# Patient Record
Sex: FEMALE | Race: WHITE | NOT HISPANIC OR LATINO | Employment: OTHER | URBAN - NONMETROPOLITAN AREA
[De-identification: names, ages, dates, MRNs, and addresses within clinical notes are randomized per-mention and may not be internally consistent; named-entity substitution may affect disease eponyms.]

---

## 2022-10-31 ENCOUNTER — APPOINTMENT (EMERGENCY)
Dept: CT IMAGING | Facility: HOSPITAL | Age: 82
End: 2022-10-31

## 2022-10-31 ENCOUNTER — HOSPITAL ENCOUNTER (EMERGENCY)
Facility: HOSPITAL | Age: 82
Discharge: HOME | End: 2022-10-31
Attending: EMERGENCY MEDICINE

## 2022-10-31 VITALS
TEMPERATURE: 97.1 F | OXYGEN SATURATION: 97 % | RESPIRATION RATE: 16 BRPM | SYSTOLIC BLOOD PRESSURE: 139 MMHG | DIASTOLIC BLOOD PRESSURE: 78 MMHG | HEART RATE: 60 BPM

## 2022-10-31 DIAGNOSIS — D35.01 BILATERAL ADRENAL ADENOMAS: ICD-10-CM

## 2022-10-31 DIAGNOSIS — R10.32 LEFT LOWER QUADRANT ABDOMINAL PAIN: Primary | ICD-10-CM

## 2022-10-31 DIAGNOSIS — D35.02 BILATERAL ADRENAL ADENOMAS: ICD-10-CM

## 2022-10-31 DIAGNOSIS — R16.0 LIVER MASS: ICD-10-CM

## 2022-10-31 LAB
ALBUMIN SERPL BCP-MCNC: 3.6 G/DL (ref 3.5–5)
ALP SERPL-CCNC: 82 U/L (ref 46–116)
ALT SERPL W P-5'-P-CCNC: 15 U/L (ref 12–78)
ANION GAP SERPL CALCULATED.3IONS-SCNC: 7 MMOL/L (ref 4–13)
BASOPHILS # BLD AUTO: 0.03 THOUSANDS/ÂΜL (ref 0–0.1)
BASOPHILS NFR BLD AUTO: 1 % (ref 0–1)
BILIRUB SERPL-MCNC: 0.58 MG/DL (ref 0.2–1)
BUN SERPL-MCNC: 15 MG/DL (ref 5–25)
CALCIUM SERPL-MCNC: 9.3 MG/DL (ref 8.3–10.1)
CHLORIDE SERPL-SCNC: 108 MMOL/L (ref 96–108)
CO2 SERPL-SCNC: 29 MMOL/L (ref 21–32)
CREAT SERPL-MCNC: 0.94 MG/DL (ref 0.6–1.3)
EOSINOPHIL # BLD AUTO: 0.13 THOUSAND/ÂΜL (ref 0–0.61)
EOSINOPHIL NFR BLD AUTO: 2 % (ref 0–6)
ERYTHROCYTE [DISTWIDTH] IN BLOOD BY AUTOMATED COUNT: 14.2 % (ref 11.6–15.1)
GFR SERPL CREATININE-BSD FRML MDRD: 56 ML/MIN/1.73SQ M
GLUCOSE SERPL-MCNC: 103 MG/DL (ref 65–140)
HCT VFR BLD AUTO: 43.6 % (ref 34.8–46.1)
HGB BLD-MCNC: 14.5 G/DL (ref 11.5–15.4)
IMM GRANULOCYTES # BLD AUTO: 0.02 THOUSAND/UL (ref 0–0.2)
IMM GRANULOCYTES NFR BLD AUTO: 0 % (ref 0–2)
LYMPHOCYTES # BLD AUTO: 1.57 THOUSANDS/ÂΜL (ref 0.6–4.47)
LYMPHOCYTES NFR BLD AUTO: 24 % (ref 14–44)
MCH RBC QN AUTO: 29.3 PG (ref 26.8–34.3)
MCHC RBC AUTO-ENTMCNC: 33.3 G/DL (ref 31.4–37.4)
MCV RBC AUTO: 88 FL (ref 82–98)
MONOCYTES # BLD AUTO: 0.45 THOUSAND/ÂΜL (ref 0.17–1.22)
MONOCYTES NFR BLD AUTO: 7 % (ref 4–12)
NEUTROPHILS # BLD AUTO: 4.38 THOUSANDS/ÂΜL (ref 1.85–7.62)
NEUTS SEG NFR BLD AUTO: 66 % (ref 43–75)
NRBC BLD AUTO-RTO: 0 /100 WBCS
PLATELET # BLD AUTO: 247 THOUSANDS/UL (ref 149–390)
PMV BLD AUTO: 10.2 FL (ref 8.9–12.7)
POTASSIUM SERPL-SCNC: 4.6 MMOL/L (ref 3.5–5.3)
PROT SERPL-MCNC: 7 G/DL (ref 6.4–8.4)
RBC # BLD AUTO: 4.95 MILLION/UL (ref 3.81–5.12)
SODIUM SERPL-SCNC: 144 MMOL/L (ref 135–147)
WBC # BLD AUTO: 6.58 THOUSAND/UL (ref 4.31–10.16)

## 2022-10-31 RX ORDER — METRONIDAZOLE 500 MG/1
500 TABLET ORAL EVERY 12 HOURS SCHEDULED
Qty: 14 TABLET | Refills: 0 | Status: SHIPPED | OUTPATIENT
Start: 2022-10-31 | End: 2022-11-07

## 2022-10-31 RX ORDER — CIPROFLOXACIN 500 MG/1
500 TABLET, FILM COATED ORAL EVERY 12 HOURS SCHEDULED
Qty: 14 TABLET | Refills: 0 | Status: SHIPPED | OUTPATIENT
Start: 2022-10-31 | End: 2022-11-07

## 2022-10-31 RX ORDER — METRONIDAZOLE 500 MG/1
500 TABLET ORAL ONCE
Status: DISCONTINUED | OUTPATIENT
Start: 2022-10-31 | End: 2022-10-31 | Stop reason: HOSPADM

## 2022-10-31 RX ORDER — CIPROFLOXACIN 500 MG/1
500 TABLET, FILM COATED ORAL ONCE
Status: COMPLETED | OUTPATIENT
Start: 2022-10-31 | End: 2022-10-31

## 2022-10-31 RX ADMIN — CIPROFLOXACIN 500 MG: 500 TABLET, FILM COATED ORAL at 20:42

## 2022-10-31 NOTE — ED NOTES
Patient okay for straight stick for bloodwork per provider at this time       Ghassan Irwin, ANDREW  10/31/22 5774

## 2022-10-31 NOTE — ED PROVIDER NOTES
History  Chief Complaint   Patient presents with   • Abdominal Pain     Patient states she has diverticulitis and was sent by her PCP for a cat scan  She complains of LLQ pain  Denies N/V/D  Patient is an 79year old female with PMH of DM, hypertension, and diverticulosis that presents the emergency department with left lower quadrant abdominal pain that started this morning  Patient denies fever, and only has pain when she is walking  She says that this feels exactly like diverticulitis that she has had several times in the past   She reports that she has diverticulitis flares that occur up to 4 times a year, and occur with changes of seasons and any time she physically exerts herself  She reports that in the past she has been placed on ciprofloxacin and called her doctor for a prescription for this but was sent to the emergency department to rule out any worsening intra-abdominal pathology with CT scan  She denies nausea, vomiting, diarrhea, blood in her stool, or urinary symptoms  Patient denies other symptoms and otherwise feels in her normal state of health  None       Past Medical History:   Diagnosis Date   • Diverticulitis    • Hypertension    • TIA (transient ischemic attack)        Past Surgical History:   Procedure Laterality Date   • COLECTOMY         History reviewed  No pertinent family history  I have reviewed and agree with the history as documented  E-Cigarette/Vaping   • E-Cigarette Use Never User      E-Cigarette/Vaping Substances     Social History     Tobacco Use   • Smoking status: Current Every Day Smoker     Packs/day: 0 25   • Smokeless tobacco: Never Used   Vaping Use   • Vaping Use: Never used   Substance Use Topics   • Drug use: Never        Review of Systems   Constitutional: Negative for chills and fever  HENT: Negative for congestion, ear pain and sore throat  Eyes: Negative for pain and visual disturbance     Respiratory: Negative for cough and shortness of breath  Cardiovascular: Negative for chest pain and palpitations  Gastrointestinal: Positive for abdominal pain (Left lower quadrant)  Negative for constipation, diarrhea, nausea and vomiting  Genitourinary: Negative for dysuria and hematuria  Musculoskeletal: Negative for arthralgias and back pain  Skin: Negative for color change and rash  Neurological: Negative for dizziness, seizures, syncope, light-headedness and headaches  All other systems reviewed and are negative  Physical Exam  ED Triage Vitals   Temperature Pulse Respirations Blood Pressure SpO2   10/31/22 2035 10/31/22 1904 10/31/22 1904 10/31/22 1904 10/31/22 1904   (!) 97 1 °F (36 2 °C) 60 16 139/78 97 %      Temp Source Heart Rate Source Patient Position - Orthostatic VS BP Location FiO2 (%)   10/31/22 2035 10/31/22 1904 -- -- --   Tympanic Monitor         Pain Score       --                    Orthostatic Vital Signs  Vitals:    10/31/22 1904   BP: 139/78   Pulse: 60       Physical Exam  Vitals and nursing note reviewed  Constitutional:       General: She is not in acute distress  Appearance: Normal appearance  She is well-developed  She is not ill-appearing  HENT:      Head: Normocephalic and atraumatic  Right Ear: External ear normal       Left Ear: External ear normal       Mouth/Throat:      Pharynx: Oropharynx is clear  No oropharyngeal exudate or posterior oropharyngeal erythema  Eyes:      General:         Right eye: No discharge  Left eye: No discharge  Extraocular Movements: Extraocular movements intact  Conjunctiva/sclera: Conjunctivae normal    Cardiovascular:      Rate and Rhythm: Normal rate and regular rhythm  Pulses: Normal pulses  Heart sounds: Normal heart sounds  No murmur heard  Pulmonary:      Effort: Pulmonary effort is normal  No respiratory distress  Breath sounds: Normal breath sounds  No wheezing, rhonchi or rales  Abdominal:      General: Abdomen is flat  Palpations: Abdomen is soft  Tenderness: There is abdominal tenderness (To deep palpation) in the left lower quadrant  There is no guarding or rebound  Negative signs include McBurney's sign  Hernia: There is no hernia in the umbilical area or ventral area  Musculoskeletal:         General: No swelling or deformity  Normal range of motion  Cervical back: Neck supple  No tenderness  Skin:     General: Skin is warm and dry  Capillary Refill: Capillary refill takes less than 2 seconds  Neurological:      Mental Status: She is alert           ED Medications  Medications   ciprofloxacin (CIPRO) tablet 500 mg (has no administration in time range)   metroNIDAZOLE (FLAGYL) tablet 500 mg (has no administration in time range)       Diagnostic Studies  Results Reviewed     Procedure Component Value Units Date/Time    Comprehensive metabolic panel [[id number]] Collected: 10/31/22 1904    Lab Status: Final result Specimen: Blood from Arm, Right Updated: 10/31/22 1951     Sodium 144 mmol/L      Potassium 4 6 mmol/L      Chloride 108 mmol/L      CO2 29 mmol/L      ANION GAP 7 mmol/L      BUN 15 mg/dL      Creatinine 0 94 mg/dL      Glucose 103 mg/dL      Calcium 9 3 mg/dL      AST --     ALT 15 U/L      Alkaline Phosphatase 82 U/L      Total Protein 7 0 g/dL      Albumin 3 6 g/dL      Total Bilirubin 0 58 mg/dL      eGFR 56 ml/min/1 73sq m     Narrative:      [place] guidelines for Chronic Kidney Disease (CKD):   •  Stage 1 with normal or high GFR (GFR > 90 mL/min/1 73 square meters)  •  Stage 2 Mild CKD (GFR = 60-89 mL/min/1 73 square meters)  •  Stage 3A Moderate CKD (GFR = 45-59 mL/min/1 73 square meters)  •  Stage 3B Moderate CKD (GFR = 30-44 mL/min/1 73 square meters)  •  Stage 4 Severe CKD (GFR = 15-29 mL/min/1 73 square meters)  •  Stage 5 End Stage CKD (GFR <15 mL/min/1 73 square meters)  Note: GFR calculation is accurate only with a steady state creatinine    CBC and differential [[id number]] Collected: 10/31/22 1904    Lab Status: Final result Specimen: Blood from Arm, Right Updated: 10/31/22 1909     WBC 6 58 Thousand/uL      RBC 4 95 Million/uL      Hemoglobin 14 5 g/dL      Hematocrit 43 6 %      MCV 88 fL      MCH 29 3 pg      MCHC 33 3 g/dL      RDW 14 2 %      MPV 10 2 fL      Platelets 260 Thousands/uL      nRBC 0 /100 WBCs      Neutrophils Relative 66 %      Immat GRANS % 0 %      Lymphocytes Relative 24 %      Monocytes Relative 7 %      Eosinophils Relative 2 %      Basophils Relative 1 %      Neutrophils Absolute 4 38 Thousands/µL      Immature Grans Absolute 0 02 Thousand/uL      Lymphocytes Absolute 1 57 Thousands/µL      Monocytes Absolute 0 45 Thousand/µL      Eosinophils Absolute 0 13 Thousand/µL      Basophils Absolute 0 03 Thousands/µL                  CT abdomen pelvis wo contrast   Final Result by Tico Zuleta MD (10/31 1956)   1  No acute findings  2   Indeterminate right hepatic dome partially calcified mass  Recommend hepatic protocol MR for further evaluation  Tissue sampling may also be required for definitive characterization  3   Left adrenal gland adenomas x2  The study was marked in EPIC for significant notification  Workstation performed: [id number]               Procedures  Procedures      ED Course                                       MDM  Number of Diagnoses or Management Options  Bilateral adrenal adenomas  Left lower quadrant abdominal pain  Liver mass  Diagnosis management comments: 82F with PMH of DM, hypertension, and extensive diverticulitis history presents the emergency department with left lower quadrant abdominal pain that started this morning  On exam the patient has tenderness to deep palpation of left lower quadrant without guarding or rebound  Patient only has pain with ambulation  Cbc and CMP are within normal limits and the patient denies any urinary symptoms    CT scan of the abdomen reveals no acute findings but does show known right hepatic mass and bilateral adrenal adenomas  These incidental findings are discussed with the patient and they will follow-up with their primary care provider for these, however these have been known and established for a long time  No pulsatile abdominal mass to suggest AAA  No Point RLQ tenderness to suggest appy  No pain out of proportion to suggest ischemic colitis  No reported vomiting or diarrhea  No reported dysuria or hematuria to suggest pyelo  No reported chest pain, pleuritic chest pain or shortness of breath  No hematemesis/melena/hematochezia reported  Able to tolerate PO  Still passing gas/stools  No reported pulmonary symptoms  No tachypnea  No suprapubic or CVA tenderness  No fever/ruq pain/jaundice  In the presence of left lower quadrant abdominal pain consistent with the patient's prior diverticulitis it is possible that she is experiencing very early symptoms for diverticulitis flare and will be treated with ciprofloxacin, however she is advised to follow-up with her primary care provider for re-evaluation if symptoms do not improve over the next few days  Disposition  Final diagnoses:   Left lower quadrant abdominal pain   Liver mass   Bilateral adrenal adenomas     Time reflects when diagnosis was documented in both MDM as applicable and the Disposition within this note     Time User Action Codes Description Comment    10/31/2022  8:20 PM Moulton Alexandr Add [R10 32] Left lower quadrant abdominal pain     10/31/2022  8:20 PM Moulton Alexandr Add [R16 0] Liver mass     10/31/2022  8:20 PM Moulton Alexandr Add [D35 01,  D35 02] Bilateral adrenal adenomas       ED Disposition     ED Disposition   Discharge    Condition   Stable    Date/Time   Mon Oct 31, 2022  8:19 PM    [facility] discharge to home/self care                 Follow-up Information     Follow up With Specialties Details Why Contact Info Additional Information [facility] Emergency Department Emergency Medicine Go to  if you develop severe left lower quadrant abdominal pain that is associated with fevers, bloody bowel movements, or blood in your vomit  [address] Emergency Department, [address], [address]          Patient's Medications   Discharge Prescriptions    CIPROFLOXACIN (CIPRO) 500 MG TABLET    Take 1 tablet (500 mg total) by mouth every 12 (twelve) hours for 7 days       Start Date: 10/31/2022End Date: 11/7/2022       Order Dose: 500 mg       Quantity: 14 tablet    Refills: 0    METRONIDAZOLE (FLAGYL) 500 MG TABLET    Take 1 tablet (500 mg total) by mouth every 12 (twelve) hours for 7 days       Start Date: 10/31/2022End Date: 11/7/2022       Order Dose: 500 mg       Quantity: 14 tablet    Refills: 0     No discharge procedures on file  PDMP Review     None           ED Provider  Attending physically available and evaluated Carlos A Porras I managed the patient along with the ED Attending      Electronically Signed by         Kary Bartlett DO  10/31/22 2039

## 2022-11-01 NOTE — ED ATTENDING ATTESTATION
10/31/2022  Steven BAUMAN DO, saw and evaluated the patient  I have discussed the patient with the resident/non-physician practitioner and agree with the resident's/non-physician practitioner's findings, Plan of Care, and MDM as documented in the resident's/non-physician practitioner's note, except where noted  All available labs and Radiology studies were reviewed  I was present for key portions of any procedure(s) performed by the resident/non-physician practitioner and I was immediately available to provide assistance  At this point I agree with the current assessment done in the Emergency Department  I have conducted an independent evaluation of this patient a history and physical is as follows:    ED Course     This is an 57-year-old female presenting to the emergency department for evaluation of left lower quadrant abdominal pain  Past medical history significant for TIA, hypertension, diverticulitis, prior surgical colectomy  She presents for left lower quadrant pain x1 day  She reports symptoms when she is walking no symptoms at rest   No associated nausea vomiting fevers loose stools urinary symptoms or other abdominal pain  Denies falls or trauma  She thinks this is due to her diverticulitis  She called her primary doctor requesting antibiotic however they referred her to the emergency department for CT imaging to rule out possibility of abscess perforation or other complication  She reports he has with the exact same symptoms she typically gets with her diverticulitis presentations  Workup in the emergency department was notable for normal vital signs  Labs were obtained which were normal   CT imaging did not reveal obvious evidence of diverticulitis or other acute process within the abdomen or pelvis  Her exam was benign without peritoneal signs  She was not in any distress  She did not require any pain medication in the ER        CT abdomen pelvis wo contrast   Final Result   1  No acute findings  2   Indeterminate right hepatic dome partially calcified mass  Recommend hepatic protocol MR for further evaluation  Tissue sampling may also be required for definitive characterization  3   Left adrenal gland adenomas x2  The study was marked in EPIC for significant notification  Workstation performed: [id number]           There were incidental findings on the CT scan  On further review of prior imaging at [facility] through Care everywhere these findings were chronic and stable  They were previously known cystic lesions in the adrenal glands  There were previously seen right hepatic dome partially calcified mass  Patient has known about these and the patient, family and I do not feel they are related to her  Her current presentation  I did inform them of the radiologist's recommendation for MR the further characterize the hepatic lesion  Will refer to primary doctor  As she knows about these findings in the appears stable she is not interested in further workup at this time  We had a conversation about what to do regarding her presenting symptoms and the lack of clear findings of diverticulitis  I discussed the risks and benefits of antibiotic therapy as is his but not benign can induce antibiotic resistance as well as colitis and other symptoms  She feels that due to the symptoms being early and typical of her previous symptoms of diverticulitis he would like to trial Cipro empirically  I did discuss that there is no hard evidence to begin this therapy but based on her story and presenting symptoms I think it is reasonable to try  She will follow closely with primary doctor  She will return here if her symptoms do not improve or worsen with antibiotic therapy      Critical Care Time  Procedures

## 2022-11-01 NOTE — DISCHARGE INSTRUCTIONS
- follow-up with your gastroenterologist for further evaluation and treatment of recurrent diverticulitis  - return to the emergency department if you develop severe left lower quadrant abdominal pain that is associated with fevers, bloody bowel movements, or blood in your vomit    - take Cipro and Flagyl for the next 7 days

## 2023-08-05 ENCOUNTER — APPOINTMENT (EMERGENCY)
Dept: CT IMAGING | Facility: HOSPITAL | Age: 83
End: 2023-08-05
Payer: MEDICARE

## 2023-08-05 ENCOUNTER — HOSPITAL ENCOUNTER (EMERGENCY)
Facility: HOSPITAL | Age: 83
Discharge: HOME | End: 2023-08-05
Attending: EMERGENCY MEDICINE
Payer: MEDICARE

## 2023-08-05 VITALS
SYSTOLIC BLOOD PRESSURE: 112 MMHG | RESPIRATION RATE: 20 BRPM | DIASTOLIC BLOOD PRESSURE: 68 MMHG | TEMPERATURE: 98 F | BODY MASS INDEX: 25.22 KG/M2 | WEIGHT: 160.72 LBS | HEART RATE: 73 BPM | OXYGEN SATURATION: 92 % | HEIGHT: 67 IN

## 2023-08-05 DIAGNOSIS — K57.92 DIVERTICULITIS: Primary | ICD-10-CM

## 2023-08-05 LAB
ALBUMIN SERPL BCP-MCNC: 4 G/DL (ref 3.5–5)
ALP SERPL-CCNC: 73 U/L (ref 34–104)
ALT SERPL W P-5'-P-CCNC: 10 U/L (ref 7–52)
ANION GAP SERPL CALCULATED.3IONS-SCNC: 11 MMOL/L
APTT PPP: 32 SECONDS (ref 23–37)
AST SERPL W P-5'-P-CCNC: 13 U/L (ref 13–39)
ATRIAL RATE: 90 BPM
BASOPHILS # BLD AUTO: 0.02 THOUSANDS/ÂΜL (ref 0–0.1)
BASOPHILS NFR BLD AUTO: 0 % (ref 0–1)
BILIRUB SERPL-MCNC: 1.82 MG/DL (ref 0.2–1)
BUN SERPL-MCNC: 15 MG/DL (ref 5–25)
CALCIUM SERPL-MCNC: 9.8 MG/DL (ref 8.4–10.2)
CHLORIDE SERPL-SCNC: 96 MMOL/L (ref 96–108)
CO2 SERPL-SCNC: 26 MMOL/L (ref 21–32)
CREAT SERPL-MCNC: 0.99 MG/DL (ref 0.6–1.3)
EOSINOPHIL # BLD AUTO: 0.06 THOUSAND/ÂΜL (ref 0–0.61)
EOSINOPHIL NFR BLD AUTO: 1 % (ref 0–6)
ERYTHROCYTE [DISTWIDTH] IN BLOOD BY AUTOMATED COUNT: 13.8 % (ref 11.6–15.1)
GFR SERPL CREATININE-BSD FRML MDRD: 52 ML/MIN/1.73SQ M
GLUCOSE SERPL-MCNC: 113 MG/DL (ref 65–140)
HCT VFR BLD AUTO: 45.5 % (ref 34.8–46.1)
HGB BLD-MCNC: 15 G/DL (ref 11.5–15.4)
IMM GRANULOCYTES # BLD AUTO: 0.04 THOUSAND/UL (ref 0–0.2)
IMM GRANULOCYTES NFR BLD AUTO: 0 % (ref 0–2)
INR PPP: 1.11 (ref 0.84–1.19)
LACTATE SERPL-SCNC: 1.3 MMOL/L (ref 0.5–2)
LIPASE SERPL-CCNC: <6 U/L (ref 11–82)
LYMPHOCYTES # BLD AUTO: 1.49 THOUSANDS/ÂΜL (ref 0.6–4.47)
LYMPHOCYTES NFR BLD AUTO: 13 % (ref 14–44)
MCH RBC QN AUTO: 29.1 PG (ref 26.8–34.3)
MCHC RBC AUTO-ENTMCNC: 33 G/DL (ref 31.4–37.4)
MCV RBC AUTO: 88 FL (ref 82–98)
MONOCYTES # BLD AUTO: 0.92 THOUSAND/ÂΜL (ref 0.17–1.22)
MONOCYTES NFR BLD AUTO: 8 % (ref 4–12)
NEUTROPHILS # BLD AUTO: 8.86 THOUSANDS/ÂΜL (ref 1.85–7.62)
NEUTS SEG NFR BLD AUTO: 78 % (ref 43–75)
NRBC BLD AUTO-RTO: 0 /100 WBCS
P AXIS: 73 DEGREES
PLATELET # BLD AUTO: 286 THOUSANDS/UL (ref 149–390)
PMV BLD AUTO: 11.1 FL (ref 8.9–12.7)
POTASSIUM SERPL-SCNC: 3.3 MMOL/L (ref 3.5–5.3)
PR INTERVAL: 158 MS
PROCALCITONIN SERPL-MCNC: 0.1 NG/ML
PROT SERPL-MCNC: 7.2 G/DL (ref 6.4–8.4)
PROTHROMBIN TIME: 14.5 SECONDS (ref 11.6–14.5)
QRS AXIS: 84 DEGREES
QRSD INTERVAL: 84 MS
QT INTERVAL: 380 MS
QTC INTERVAL: 464 MS
RBC # BLD AUTO: 5.16 MILLION/UL (ref 3.81–5.12)
SODIUM SERPL-SCNC: 133 MMOL/L (ref 135–147)
T WAVE AXIS: 60 DEGREES
VENTRICULAR RATE: 90 BPM
WBC # BLD AUTO: 11.39 THOUSAND/UL (ref 4.31–10.16)

## 2023-08-05 PROCEDURE — 93005 ELECTROCARDIOGRAM TRACING: CPT

## 2023-08-05 PROCEDURE — 80053 COMPREHEN METABOLIC PANEL: CPT

## 2023-08-05 PROCEDURE — 83690 ASSAY OF LIPASE: CPT

## 2023-08-05 PROCEDURE — 84145 PROCALCITONIN (PCT): CPT

## 2023-08-05 PROCEDURE — 83605 ASSAY OF LACTIC ACID: CPT

## 2023-08-05 PROCEDURE — 96361 HYDRATE IV INFUSION ADD-ON: CPT

## 2023-08-05 PROCEDURE — 87040 BLOOD CULTURE FOR BACTERIA: CPT

## 2023-08-05 PROCEDURE — 99284 EMERGENCY DEPT VISIT MOD MDM: CPT

## 2023-08-05 PROCEDURE — G1004 CDSM NDSC: HCPCS

## 2023-08-05 PROCEDURE — 74176 CT ABD & PELVIS W/O CONTRAST: CPT

## 2023-08-05 PROCEDURE — 36415 COLL VENOUS BLD VENIPUNCTURE: CPT

## 2023-08-05 PROCEDURE — 85610 PROTHROMBIN TIME: CPT

## 2023-08-05 PROCEDURE — 96374 THER/PROPH/DIAG INJ IV PUSH: CPT

## 2023-08-05 PROCEDURE — 85730 THROMBOPLASTIN TIME PARTIAL: CPT

## 2023-08-05 PROCEDURE — 99285 EMERGENCY DEPT VISIT HI MDM: CPT | Performed by: EMERGENCY MEDICINE

## 2023-08-05 PROCEDURE — 85025 COMPLETE CBC W/AUTO DIFF WBC: CPT

## 2023-08-05 RX ORDER — OMEGA-3 FATTY ACIDS/FISH OIL 300-1000MG
200 CAPSULE ORAL EVERY 6 HOURS PRN
Status: ON HOLD | COMMUNITY
End: 2023-08-13 | Stop reason: ALTCHOICE

## 2023-08-05 RX ORDER — HYDROMORPHONE HCL IN WATER/PF 6 MG/30 ML
0.2 PATIENT CONTROLLED ANALGESIA SYRINGE INTRAVENOUS ONCE
Status: COMPLETED | OUTPATIENT
Start: 2023-08-05 | End: 2023-08-05

## 2023-08-05 RX ORDER — AMOXICILLIN AND CLAVULANATE POTASSIUM 875; 125 MG/1; MG/1
1 TABLET, FILM COATED ORAL EVERY 8 HOURS
Qty: 15 TABLET | Refills: 0 | Status: SHIPPED | OUTPATIENT
Start: 2023-08-05 | End: 2023-08-10

## 2023-08-05 RX ORDER — ERGOCALCIFEROL 1.25 MG/1
50000 CAPSULE ORAL
COMMUNITY

## 2023-08-05 RX ORDER — ONDANSETRON 4 MG/1
4 TABLET, FILM COATED ORAL EVERY 8 HOURS PRN
Qty: 12 TABLET | Refills: 0 | Status: SHIPPED | OUTPATIENT
Start: 2023-08-05

## 2023-08-05 RX ORDER — CIPROFLOXACIN 500 MG/1
500 TABLET, FILM COATED ORAL 2 TIMES DAILY
COMMUNITY
Start: 2023-08-04 | End: 2023-08-11

## 2023-08-05 RX ORDER — ALPRAZOLAM 0.25 MG/1
.125-.25 TABLET ORAL 2 TIMES DAILY PRN
COMMUNITY
Start: 2023-07-15 | End: 2024-01-11

## 2023-08-05 RX ORDER — COLCHICINE 0.6 MG/1
0.6 TABLET ORAL 2 TIMES DAILY
Status: ON HOLD | COMMUNITY
Start: 2023-07-26 | End: 2023-08-13 | Stop reason: ALTCHOICE

## 2023-08-05 RX ORDER — AMOXICILLIN AND CLAVULANATE POTASSIUM 875; 125 MG/1; MG/1
1 TABLET, FILM COATED ORAL ONCE
Status: DISCONTINUED | OUTPATIENT
Start: 2023-08-05 | End: 2023-08-05 | Stop reason: HOSPADM

## 2023-08-05 RX ORDER — ENALAPRIL MALEATE 10 MG/1
1 TABLET ORAL DAILY
COMMUNITY
Start: 2023-03-20 | End: 2023-08-16

## 2023-08-05 RX ORDER — ACETAMINOPHEN 500 MG
500 TABLET ORAL EVERY 4 HOURS PRN
COMMUNITY

## 2023-08-05 RX ADMIN — HYDROMORPHONE HYDROCHLORIDE 0.2 MG: 0.2 INJECTION, SOLUTION INTRAMUSCULAR; INTRAVENOUS; SUBCUTANEOUS at 09:17

## 2023-08-05 RX ADMIN — SODIUM CHLORIDE 1000 ML: 0.9 INJECTION, SOLUTION INTRAVENOUS at 09:41

## 2023-08-05 NOTE — ED NOTES
Pt refused augmentin at this time as she is already taking cipro. Dr Manjit Anderson aware.       Cj Powell, ANDREW  08/05/23 1423

## 2023-08-05 NOTE — ED ATTENDING ATTESTATION
Final Diagnosis:  1. Diverticulitis      ED Course as of 08/05/23 1236   Sat Aug 05, 2023   2207 CT abdomen pelvis wo contrast  No obvious free air in the abdomen, there is a left-sided renal cyst, patient appears to have extensive diverticulosis, possible inflammation in the lower colon to indicate diverticulitis but unclear   0957 Procedure Note: EKG  Date/Time: 08/05/23 9:57 AM   Interpreted by: Marsha Hand  Indications / Diagnosis: ab pain  ECG reviewed by me, the ED Provider: yes   The EKG demonstrates:  Rhythm: normal sinus  Intervals: normal intervals  Axis: normal axis  QRS/Blocks: normal QRS  ST Changes: No acute ST Changes, no STD/LILIAN. No diffuse elevations to indicate pericarditis. No coved ST elevations greater than 2mm with negative T waves in V1-3 to indicate concern for brugada. No biphasic T waves in V2, V3 to indicate Wellens (critical stenosis of LAD). No elevation in aVR or deviation when compared to V1 (can be associated with ST depression in I,II, V4-6 when left main occlusion is present). Gil Burt MD, saw and evaluated the patient. All available labs and X-rays were ordered by me or the resident and have been reviewed by myself. I discussed the patient with the resident / non-physician and agree with the resident's / non-physician practitioner's findings and plan as documented in the resident's / non-physician practicitioner's note, except where noted. At this point, I agree with the current assessment done in the ED. I was present during key portions of all procedures performed unless otherwise stated. Chief Complaint   Patient presents with   • Abdominal Pain     Abdominal pain with constipation for few days. History of diverticulitis     This is a 79 y.o. female presenting for evaluation of abdominal pain. Patient states that she was having left lower quadrant abdominal pain for approximately last 2 days.   Does have a history of colectomy as well as diverticulitis. Patient endorses some nausea. States she has not had bowel movement in a couple days. No episodes of vomiting. Denies any chest pain, shortness of breath. Patient states that she did have a fever yesterday. Review of records show that the patient was seen a couple months ago for similar type presentation without any acute pathology found. Was provided with Cipro and Flagyl at that time as well. She appears to have an iodine allergy. Most of her scans are without contrast or, if it is administered she gets oral contrast.  She has not had a scan with IV contrast since approximately 2012. PMH:   has a past medical history of Diverticulitis, Hypertension, and TIA (transient ischemic attack). PSH:   has a past surgical history that includes Colectomy. Procedures     Social:  Social History     Substance and Sexual Activity   Alcohol Use None     Social History     Tobacco Use   Smoking Status Every Day   • Packs/day: 0.25   • Types: Cigarettes   Smokeless Tobacco Never     Social History     Substance and Sexual Activity   Drug Use Never     PE:  Vitals:    08/05/23 0844 08/05/23 0900 08/05/23 0945 08/05/23 1015   BP:  104/62 110/68 112/68   BP Location:  Right arm Right arm Right arm   Pulse:  88 76 73   Resp:  18 18 20   Temp:       TempSrc:       SpO2:  93% 92% 92%   Weight:       Height: 5' 7" (1.702 m)          A:    Unless otherwise specified above:     General: VS reviewed  Appears in NAD     Head: Normocephalic, atraumatic     CV: No pallor noted  Lungs:   No respiratory distress     Abdomen:  Soft, non-tender, non-distended     MSK:   No obvious deformity     Skin: No obvious rash. Neuro: Awake, alert, GCS15, CN II-XII grossly intact. Speaking in full sentences. Motor grossly intact. Psychiatric/Behavioral: Appropriate mood and affect   Exam: deferred    P:  -Patient appears nondistressed sitting in bed.   She states that her abdominal pain has improved significantly after receiving 0.2 mg of Dilaudid. Will evaluate for intra-abdominal pathology such as pancreatitis, diverticulitis, bowel obstruction. Provide fluids. -CT consider consistent with diverticulitis. Reviewed results with patient. She feels improved after seeing medications here. We will start her on antibiotics. Return precautions reviewed. - 13 point ROS was performed and all are normal unless stated in the history above. - Nursing note reviewed. Vitals reviewed. - Orders placed by myself and/or advanced practitioner / resident.    - Previous chart was reviewed  - No language barrier.   - History obtained from patient. - There are no limitations to the history obtained. Code Status: No Order  Advance Directive and Living Will:      Power of :    POLST:      Medications   amoxicillin-clavulanate (AUGMENTIN) 875-125 mg per tablet 1 tablet (1 tablet Oral Not Given 8/5/23 1232)   HYDROmorphone HCl (DILAUDID) injection 0.2 mg (0.2 mg Intravenous Given 8/5/23 0917)   sodium chloride 0.9 % bolus 1,000 mL (0 mL Intravenous Stopped 8/5/23 1041)     CT abdomen pelvis wo contrast   Final Result         1. Focal sigmoid diverticulitis without abscess or free air. Given associated colonic wall thickening, consider colonoscopic follow-up after resolution of acute episode of illness--if not already recently performed--to exclude underlying mural infiltrative process. 2. Partially calcified mass at the right liver dome and nonaggressive left adrenal lesion. Review of reports from outside studies in [address] [place] [address] shows that these findings have been present since at least 2007, obviating need for further imaging    work-up. Note: The study was marked in EPIC for significant notification. Colonoscopic follow-up reminder notification was scheduled in the electronic medical record.             Workstation performed: [id number]           Orders Placed This Encounter   Procedures   • Blood culture #1   • Blood culture #2   • CT abdomen pelvis wo contrast   • CBC and differential   • Comprehensive metabolic panel   • Lactic acid   • Procalcitonin   • Protime-INR   • APTT   • UA w Reflex to Microscopic w Reflex to Culture   • Lipase   • ECG 12 lead     Labs Reviewed   CBC AND DIFFERENTIAL - Abnormal       Result Value Ref Range Status    WBC 11.39 (*) 4.31 - 10.16 Thousand/uL Final    RBC 5.16 (*) 3.81 - 5.12 Million/uL Final    Hemoglobin 15.0  11.5 - 15.4 g/dL Final    Hematocrit 45.5  34.8 - 46.1 % Final    MCV 88  82 - 98 fL Final    MCH 29.1  26.8 - 34.3 pg Final    MCHC 33.0  31.4 - 37.4 g/dL Final    RDW 13.8  11.6 - 15.1 % Final    MPV 11.1  8.9 - 12.7 fL Final    Platelets 156  180 - 390 Thousands/uL Final    nRBC 0  /100 WBCs Final    Neutrophils Relative 78 (*) 43 - 75 % Final    Immat GRANS % 0  0 - 2 % Final    Lymphocytes Relative 13 (*) 14 - 44 % Final    Monocytes Relative 8  4 - 12 % Final    Eosinophils Relative 1  0 - 6 % Final    Basophils Relative 0  0 - 1 % Final    Neutrophils Absolute 8.86 (*) 1.85 - 7.62 Thousands/µL Final    Immature Grans Absolute 0.04  0.00 - 0.20 Thousand/uL Final    Lymphocytes Absolute 1.49  0.60 - 4.47 Thousands/µL Final    Monocytes Absolute 0.92  0.17 - 1.22 Thousand/µL Final    Eosinophils Absolute 0.06  0.00 - 0.61 Thousand/µL Final    Basophils Absolute 0.02  0.00 - 0.10 Thousands/µL Final   COMPREHENSIVE METABOLIC PANEL - Abnormal    Sodium 133 (*) 135 - 147 mmol/L Final    Potassium 3.3 (*) 3.5 - 5.3 mmol/L Final    Chloride 96  96 - 108 mmol/L Final    CO2 26  21 - 32 mmol/L Final    ANION GAP 11  mmol/L Final    BUN 15  5 - 25 mg/dL Final    Creatinine 0.99  0.60 - 1.30 mg/dL Final    Comment: Standardized to IDMS reference method    Glucose 113  65 - 140 mg/dL Final    Comment: If the patient is fasting, the ADA then defines impaired fasting glucose as > 100 mg/dL and diabetes as > or equal to 123 mg/dL.     Calcium 9.8  8.4 - 10.2 mg/dL Final AST 13  13 - 39 U/L Final    ALT 10  7 - 52 U/L Final    Comment: Specimen collection should occur prior to Sulfasalazine administration due to the potential for falsely depressed results. Alkaline Phosphatase 73  34 - 104 U/L Final    Total Protein 7.2  6.4 - 8.4 g/dL Final    Albumin 4.0  3.5 - 5.0 g/dL Final    Total Bilirubin 1.82 (*) 0.20 - 1.00 mg/dL Final    Comment: Use of this assay is not recommended for patients undergoing treatment with eltrombopag due to the potential for falsely elevated results. N-acetyl-p-benzoquinone imine (metabolite of Acetaminophen) will generate erroneously low results in samples for patients that have taken an overdose of Acetaminophen. eGFR 52  ml/min/1.73sq m Final    Narrative:     [facility] guidelines for Chronic Kidney Disease (CKD):   •  Stage 1 with normal or high GFR (GFR > 90 mL/min/1.73 square meters)  •  Stage 2 Mild CKD (GFR = 60-89 mL/min/1.73 square meters)  •  Stage 3A Moderate CKD (GFR = 45-59 mL/min/1.73 square meters)  •  Stage 3B Moderate CKD (GFR = 30-44 mL/min/1.73 square meters)  •  Stage 4 Severe CKD (GFR = 15-29 mL/min/1.73 square meters)  •  Stage 5 End Stage CKD (GFR <15 mL/min/1.73 square meters)  Note: GFR calculation is accurate only with a steady state creatinine   LIPASE - Abnormal    Lipase <6 (*) 11 - 82 u/L Final   LACTIC ACID, PLASMA (W/REFLEX IF RESULT > 2.0) - Normal    LACTIC ACID 1.3  0.5 - 2.0 mmol/L Final    Narrative:     Result may be elevated if tourniquet was used during collection. PROCALCITONIN TEST - Normal    Procalcitonin 0.10  <=0.25 ng/ml Final    Comment: Suspected Lower Respiratory Tract Infection (LRTI):  - LESS than or EQUAL to 0.25 ng/mL:   low likelihood for bacterial LRTI; antibiotics DISCOURAGED.  - GREATER than 0.25 ng/mL:   increased likelihood for bacterial LRTI; antibiotics ENCOURAGED. Suspected Sepsis:  - Strongly consider initiating antibiotics in ALL UNSTABLE patients.   - LESS than or EQUAL to 0.5 ng/mL:   low likelihood for bacterial sepsis; antibiotics DISCOURAGED.  - GREATER than 0.5 ng/mL:   increased likelihood for bacterial sepsis; antibiotics ENCOURAGED.  - GREATER than 2 ng/mL:   high risk for severe sepsis / septic shock; antibiotics strongly ENCOURAGED. Decisions on antibiotic use should not be based solely on Procalcitonin (PCT) levels. If PCT is low but uncertainty exists with stopping antibiotics, repeat PCT in 6-24 hours to confirm the low level. If antibiotics are administered (regardless if initial PCT was high or low), repeat PCT every 1-2 days to consider early antibiotic cessation (when GREATER than 80% decrease from the peak OR when PCT drops below designated cutoffs, whichever comes first), so long as the infection is NOT one that typically requires prolonged treatment durations (e.g., bone/joint infections, endocarditis, Staph. aureus bacteremia).     Situations of FALSE-POSITIVE Procalcitonin values:  1) Newborns < 67 hours old  2) Massive stress from severe trauma / burns, major surgery, acute pancreatitis, cardiogenic / hemorrhagic shock, sickle cell crisis, or other organ perfusion abnormalities  3) Malaria and some Candidal infections  4) Treatment with agents that stimulate cytokines (e.g., OKT3, anti-lymphocyte globulins, alemtuzumab, IL-2, granulocyte transfusion [NOT GCSFs])  5) Chronic renal disease causes elevated baseline levels (consider GREATER than 0.75 ng/mL as an abnormal cut-off); initiating HD/CRRT may cause transient decreases  6) Paraneoplastic syndromes from medullary thyroid or SCLC, some forms of vasculitis, and acute fjxcx-az-rehy disease    Situations of FALSE-NEGATIVE Procalcitonin values:  1) Too early in clinical course for PCT to have reached its peak (may repeat in 6-24 hours to confirm low level)  2) Localized infection WITHOUT systemic (SIRS / sepsis) response (e.g., an abscess, osteomyelitis, cystitis)  3) Mycobacteria (e.g., Tuberculosis, MAC)  4) Cystic fibrosis exacerbations     PROTIME-INR - Normal    Protime 14.5  11.6 - 14.5 seconds Final    INR 1.11  0.84 - 1.19 Final   APTT - Normal    PTT 32  23 - 37 seconds Final    Comment: Therapeutic Heparin Range =  60-90 seconds   BLOOD CULTURE   BLOOD CULTURE   UA W REFLEX TO MICROSCOPIC WITH REFLEX TO CULTURE     Time reflects when diagnosis was documented in both MDM as applicable and the Disposition within this note     Time User Action Codes Description Comment    8/5/2023 12:12 PM Petra Munguia Add [K57.92] Diverticulitis       ED Disposition     ED Disposition   Discharge    Condition   Good    Date/Time   Sat Aug 5, 2023 12:16 PM    [address] discharge to home/self care. Follow-up Information     Follow up With Specialties Details Why JEEVAN Lin Physician Assistant Schedule an appointment as soon as possible for a visit   [address] [place] [address] Drive  [phone]          Patient's Medications   Discharge Prescriptions    AMOXICILLIN-CLAVULANATE (AUGMENTIN) 875-125 MG PER TABLET    Take 1 tablet by mouth every 8 (eight) hours for 5 days       Start Date: 8/5/2023  End Date: 8/10/2023       Order Dose: 1 tablet       Quantity: 15 tablet    Refills: 0    ONDANSETRON (ZOFRAN) 4 MG TABLET    Take 1 tablet (4 mg total) by mouth every 8 (eight) hours as needed for nausea or vomiting       Start Date: 8/5/2023  End Date: --       Order Dose: 4 mg       Quantity: 12 tablet    Refills: 0     No discharge procedures on file. Prior to Admission Medications   Prescriptions Last Dose Informant Patient Reported? Taking?    ALPRAZolam (XANAX) 0.25 mg tablet   Yes Yes   Sig: Take 0.125-0.25 mg by mouth 2 (two) times a day as needed   Ibuprofen 200 MG CAPS   Yes No   Sig: Take by mouth   acetaminophen (TYLENOL) 500 mg tablet   Yes No   Sig: Take by mouth   ciprofloxacin (CIPRO) 500 mg tablet 8/5/2023  Yes Yes   Sig: Take 500 mg by mouth 2 (two) times a day   colchicine (COLCRYS) 0.6 mg tablet   Yes Yes   Sig: Take 0.6 mg by mouth 2 (two) times a day   enalapril (VASOTEC) 10 mg tablet   Yes Yes   Sig: Take 1 tablet by mouth daily   ergocalciferol (ERGOCALCIFEROL) 1.25 MG (23853 UT) capsule   Yes No   Sig: Take 50,000 Units by mouth      Facility-Administered Medications: None       Portions of the record may have been created with voice recognition software. Occasional wrong word or "sound a like" substitutions may have occurred due to the inherent limitations of voice recognition software. Read the chart carefully and recognize, using context, where substitutions have occurred.     Electronically signed by:  Ronnie Harley

## 2023-08-05 NOTE — DISCHARGE INSTRUCTIONS
Thank you for coming into the [facility] ED today! I am sorry that you haven't been feeling well. I am prescribing you Augmentin, and Zofran for nausea if needed. If pain flares up, try high-strength tylenol. Please follow up with your PCP for further management of your diverticulitis and discussion of incidental findings.

## 2023-08-05 NOTE — ED PROVIDER NOTES
History  Chief Complaint   Patient presents with   • Abdominal Pain     Abdominal pain with constipation for few days. History of diverticulitis     Patient is an 21-year-old female with history of diverticulitis/diverticulosis, colectomy, TIA, and HTN who presents with her daughter for chief complaint of 9/10, intermittent LLQ pain with constipation for the past 2 days. She took Advil and Cipro with little to no relief. She denies blood in her stool or urine, but says that she had fever and chills yesterday with nausea today. Prior to Admission Medications   Prescriptions Last Dose Informant Patient Reported? Taking? ALPRAZolam (XANAX) 0.25 mg tablet   Yes Yes   Sig: Take 0.125-0.25 mg by mouth 2 (two) times a day as needed   Ibuprofen 200 MG CAPS   Yes No   Sig: Take by mouth   acetaminophen (TYLENOL) 500 mg tablet   Yes No   Sig: Take by mouth   ciprofloxacin (CIPRO) 500 mg tablet 8/5/2023  Yes Yes   Sig: Take 500 mg by mouth 2 (two) times a day   colchicine (COLCRYS) 0.6 mg tablet   Yes Yes   Sig: Take 0.6 mg by mouth 2 (two) times a day   enalapril (VASOTEC) 10 mg tablet   Yes Yes   Sig: Take 1 tablet by mouth daily   ergocalciferol (ERGOCALCIFEROL) 1.25 MG (33157 UT) capsule   Yes No   Sig: Take 50,000 Units by mouth      Facility-Administered Medications: None       Past Medical History:   Diagnosis Date   • Diverticulitis    • Hypertension    • TIA (transient ischemic attack)        Past Surgical History:   Procedure Laterality Date   • COLECTOMY         History reviewed. No pertinent family history. I have reviewed and agree with the history as documented.     E-Cigarette/Vaping   • E-Cigarette Use Never User      E-Cigarette/Vaping Substances     Social History     Tobacco Use   • Smoking status: Every Day     Packs/day: 0.25     Types: Cigarettes   • Smokeless tobacco: Never   Vaping Use   • Vaping Use: Never used   Substance Use Topics   • Drug use: Never        Review of Systems Constitutional: Positive for appetite change, chills and fever. Respiratory: Negative for shortness of breath. Some congestion and coughing recently   Cardiovascular: Negative for chest pain. Gastrointestinal: Positive for abdominal pain, constipation and nausea. Negative for blood in stool and vomiting. Genitourinary: Negative for hematuria. Neurological: Negative for headaches. Physical Exam  ED Triage Vitals [08/05/23 0839]   Temperature Pulse Respirations Blood Pressure SpO2   98 °F (36.7 °C) 93 18 120/66 96 %      Temp Source Heart Rate Source Patient Position - Orthostatic VS BP Location FiO2 (%)   Temporal Monitor Sitting Right arm --      Pain Score       9             Orthostatic Vital Signs  Vitals:    08/05/23 0839 08/05/23 0900 08/05/23 0945 08/05/23 1015   BP: 120/66 104/62 110/68 112/68   Pulse: 93 88 76 73   Patient Position - Orthostatic VS: Sitting Lying Lying Lying       Physical Exam  Constitutional:       General: She is not in acute distress. Appearance: She is well-developed. She is not toxic-appearing. HENT:      Head: Normocephalic and atraumatic. Cardiovascular:      Rate and Rhythm: Normal rate and regular rhythm. Heart sounds: Normal heart sounds. Pulmonary:      Effort: Pulmonary effort is normal.      Breath sounds: Wheezing present. Abdominal:      General: Abdomen is flat. Bowel sounds are normal.      Palpations: Abdomen is soft. There is no hepatomegaly, splenomegaly or mass. Tenderness: There is abdominal tenderness in the left lower quadrant. There is guarding. Skin:     General: Skin is warm and dry. Coloration: Skin is not jaundiced. Findings: No erythema or rash. Neurological:      Mental Status: She is alert.          ED Medications  Medications   amoxicillin-clavulanate (AUGMENTIN) 875-125 mg per tablet 1 tablet (has no administration in time range)   HYDROmorphone HCl (DILAUDID) injection 0.2 mg (0.2 mg Intravenous Given 8/5/23 0917)   sodium chloride 0.9 % bolus 1,000 mL (0 mL Intravenous Stopped 8/5/23 1041)       Diagnostic Studies  Results Reviewed     Procedure Component Value Units Date/Time    Comprehensive metabolic panel [[id number]]  (Abnormal) Collected: 08/05/23 0915    Lab Status: Final result Specimen: Blood from Arm, Left Updated: 08/05/23 0953     Sodium 133 mmol/L      Potassium 3.3 mmol/L      Chloride 96 mmol/L      CO2 26 mmol/L      ANION GAP 11 mmol/L      BUN 15 mg/dL      Creatinine 0.99 mg/dL      Glucose 113 mg/dL      Calcium 9.8 mg/dL      AST 13 U/L      ALT 10 U/L      Alkaline Phosphatase 73 U/L      Total Protein 7.2 g/dL      Albumin 4.0 g/dL      Total Bilirubin 1.82 mg/dL      eGFR 52 ml/min/1.73sq m     Narrative:      [place] guidelines for Chronic Kidney Disease (CKD):   •  Stage 1 with normal or high GFR (GFR > 90 mL/min/1.73 square meters)  •  Stage 2 Mild CKD (GFR = 60-89 mL/min/1.73 square meters)  •  Stage 3A Moderate CKD (GFR = 45-59 mL/min/1.73 square meters)  •  Stage 3B Moderate CKD (GFR = 30-44 mL/min/1.73 square meters)  •  Stage 4 Severe CKD (GFR = 15-29 mL/min/1.73 square meters)  •  Stage 5 End Stage CKD (GFR <15 mL/min/1.73 square meters)  Note: GFR calculation is accurate only with a steady state creatinine    Lipase [[id number]]  (Abnormal) Collected: 08/05/23 0915    Lab Status: Final result Specimen: Blood from Arm, Left Updated: 08/05/23 0953     Lipase <6 u/L     Procalcitonin [[id number]]  (Normal) Collected: 08/05/23 0915    Lab Status: Final result Specimen: Blood from Arm, Left Updated: 08/05/23 0947     Procalcitonin 0.10 ng/ml     Lactic acid [[id number]]  (Normal) Collected: 08/05/23 0915    Lab Status: Final result Specimen: Blood from Arm, Left Updated: 08/05/23 0940     LACTIC ACID 1.3 mmol/L     Narrative:      Result may be elevated if tourniquet was used during collection.     Roxanna Valera [[id number]]  (Normal) Collected: 08/05/23 0915 Lab Status: Final result Specimen: Blood from Arm, Left Updated: 08/05/23 0933     Protime 14.5 seconds      INR 1.11    APTT [[id number]]  (Normal) Collected: 08/05/23 0915    Lab Status: Final result Specimen: Blood from Arm, Left Updated: 08/05/23 0933     PTT 32 seconds     CBC and differential [[id number]]  (Abnormal) Collected: 08/05/23 0915    Lab Status: Final result Specimen: Blood from Arm, Left Updated: 08/05/23 0922     WBC 11.39 Thousand/uL      RBC 5.16 Million/uL      Hemoglobin 15.0 g/dL      Hematocrit 45.5 %      MCV 88 fL      MCH 29.1 pg      MCHC 33.0 g/dL      RDW 13.8 %      MPV 11.1 fL      Platelets 056 Thousands/uL      nRBC 0 /100 WBCs      Neutrophils Relative 78 %      Immat GRANS % 0 %      Lymphocytes Relative 13 %      Monocytes Relative 8 %      Eosinophils Relative 1 %      Basophils Relative 0 %      Neutrophils Absolute 8.86 Thousands/µL      Immature Grans Absolute 0.04 Thousand/uL      Lymphocytes Absolute 1.49 Thousands/µL      Monocytes Absolute 0.92 Thousand/µL      Eosinophils Absolute 0.06 Thousand/µL      Basophils Absolute 0.02 Thousands/µL     Blood culture #2 [[id number]] Collected: 08/05/23 0915    Lab Status: In process Specimen: Blood from Arm, Right Updated: 08/05/23 0920    Blood culture #1 [[id number]] Collected: 08/05/23 0915    Lab Status: In process Specimen: Blood from Arm, Left Updated: 08/05/23 0920    UA w Reflex to Microscopic w Reflex to Culture [[id number]]     Lab Status: No result Specimen: Urine                  CT abdomen pelvis wo contrast   Final Result by Lianna Hardin MD (08/05 1100)         1. Focal sigmoid diverticulitis without abscess or free air. Given associated colonic wall thickening, consider colonoscopic follow-up after resolution of acute episode of illness--if not already recently performed--to exclude underlying mural infiltrative process.       2. Partially calcified mass at the right liver dome and nonaggressive left adrenal lesion. Review of reports from outside studies in [address] [place], [place] [address]mary Lopez shows that these findings have been present since at least 2007, obviating need for further imaging    work-up. Note: The study was marked in EPIC for significant notification. Colonoscopic follow-up reminder notification was scheduled in the electronic medical record. Workstation performed: [id number]                       SBIRT 20yo+    Lydia Arm Most Recent Value   Initial Alcohol Screen: US AUDIT-C     1. How often do you have a drink containing alcohol? 0 Filed at: 08/05/2023 0845   2. How many drinks containing alcohol do you have on a typical day you are drinking? 0 Filed at: 08/05/2023 0845   3a. Male UNDER 65: How often do you have five or more drinks on one occasion? 0 Filed at: 08/05/2023 0845   3b. FEMALE Any Age, or MALE 65+: How often do you have 4 or more drinks on one occassion? 0 Filed at: 08/05/2023 0845   Audit-C Score 0 Filed at: 08/05/2023 3570   MARY: How many times in the past year have you. .. Used an illegal drug or used a prescription medication for non-medical reasons? Never Filed at: 08/05/2023 0845                Medical Decision Making  Amount and/or Complexity of Data Reviewed  Labs: ordered. Details: CBC, CMP, lactic acid, Lipase, PT/INR & PTT  Radiology: ordered. Details: CT Abd w/o contrast showed sigmoid diverticulitis without abscess or free air. It also showed a "partially calcified mass at the right liver dome and nonaggressive left adrenal lesion."   ECG/medicine tests: ordered and independent interpretation performed. Details: Normal sinus rhythm. No acute abnormalities  Discussion of management or test interpretation with external provider(s): Discussed CT results with patient. Patient's pain is now minimized and she feels ready to go home and is amenable to outpatient management with antibiotics, over-the-counter pain meds, and zofran, with close follow-up with PCP.  She knows about the liver calcification; apparently it's a cavernous liver hemangioma. Daughter was aware of the adrenal lesion and says that it was there on the last scan. Risk  Prescription drug management. Disposition  Final diagnoses:   Diverticulitis     Time reflects when diagnosis was documented in both MDM as applicable and the Disposition within this note     Time User Action Codes Description Comment    8/5/2023 12:12 PM Sakshi Herrera Add [K57.92] Diverticulitis       ED Disposition     ED Disposition   Discharge    Condition   Good    Date/Time   Sat Aug 5, 2023 12:16 PM    [address] discharge to home/self care. Follow-up Information     Follow up With Specialties Details Why JEEVAN Lin Physician Assistant Schedule an appointment as soon as possible for a visit   [address] [place] [address] Drive  [phone]            Patient's Medications   Discharge Prescriptions    AMOXICILLIN-CLAVULANATE (AUGMENTIN) 875-125 MG PER TABLET    Take 1 tablet by mouth every 8 (eight) hours for 5 days       Start Date: 8/5/2023  End Date: 8/10/2023       Order Dose: 1 tablet       Quantity: 15 tablet    Refills: 0    ONDANSETRON (ZOFRAN) 4 MG TABLET    Take 1 tablet (4 mg total) by mouth every 8 (eight) hours as needed for nausea or vomiting       Start Date: 8/5/2023  End Date: --       Order Dose: 4 mg       Quantity: 12 tablet    Refills: 0     No discharge procedures on file. PDMP Review     None           ED Provider  Attending physically available and evaluated Violet Medico. I managed the patient along with the ED Attending.     Electronically Signed by         Sakshi Herrera DO  08/05/23 2402

## 2023-08-09 LAB
ATRIAL RATE: 90 BPM
P AXIS: 73 DEGREES
PR INTERVAL: 158 MS
QRS AXIS: 84 DEGREES
QRSD INTERVAL: 84 MS
QT INTERVAL: 380 MS
QTC INTERVAL: 464 MS
T WAVE AXIS: 60 DEGREES
VENTRICULAR RATE: 90 BPM

## 2023-08-09 PROCEDURE — 93010 ELECTROCARDIOGRAM REPORT: CPT | Performed by: INTERNAL MEDICINE

## 2023-08-11 LAB
BACTERIA BLD CULT: NORMAL
BACTERIA BLD CULT: NORMAL

## 2023-08-13 ENCOUNTER — APPOINTMENT (EMERGENCY)
Dept: CT IMAGING | Facility: HOSPITAL | Age: 83
End: 2023-08-13
Payer: MEDICARE

## 2023-08-13 ENCOUNTER — HOSPITAL ENCOUNTER (INPATIENT)
Facility: HOSPITAL | Age: 83
LOS: 3 days | Discharge: HOME | End: 2023-08-16
Attending: EMERGENCY MEDICINE | Admitting: INTERNAL MEDICINE
Payer: MEDICARE

## 2023-08-13 DIAGNOSIS — K57.92 DIVERTICULITIS: ICD-10-CM

## 2023-08-13 DIAGNOSIS — K57.92 ACUTE DIVERTICULITIS: ICD-10-CM

## 2023-08-13 DIAGNOSIS — R00.1 BRADYCARDIA: ICD-10-CM

## 2023-08-13 DIAGNOSIS — E87.6 HYPOKALEMIA: ICD-10-CM

## 2023-08-13 DIAGNOSIS — R10.32 LLQ ABDOMINAL PAIN: Primary | ICD-10-CM

## 2023-08-13 DIAGNOSIS — N17.9 AKI (ACUTE KIDNEY INJURY) (HCC): ICD-10-CM

## 2023-08-13 DIAGNOSIS — R11.0 NAUSEA: ICD-10-CM

## 2023-08-13 PROBLEM — E03.9 HYPOTHYROIDISM: Status: ACTIVE | Noted: 2023-08-13

## 2023-08-13 PROBLEM — E11.9 TYPE 2 DIABETES MELLITUS (HCC): Status: ACTIVE | Noted: 2023-08-13

## 2023-08-13 PROBLEM — Z86.73 H/O TIA (TRANSIENT ISCHEMIC ATTACK) AND STROKE: Status: ACTIVE | Noted: 2023-08-13

## 2023-08-13 PROBLEM — K59.00 CONSTIPATION: Status: ACTIVE | Noted: 2023-08-13

## 2023-08-13 PROBLEM — E83.42 HYPOMAGNESEMIA: Status: ACTIVE | Noted: 2023-08-13

## 2023-08-13 PROBLEM — I10 HYPERTENSION: Status: ACTIVE | Noted: 2023-08-13

## 2023-08-13 LAB
2HR DELTA HS TROPONIN: -1 NG/L
4HR DELTA HS TROPONIN: -1 NG/L
ALBUMIN SERPL BCP-MCNC: 3.8 G/DL (ref 3.5–5)
ALP SERPL-CCNC: 65 U/L (ref 34–104)
ALT SERPL W P-5'-P-CCNC: 6 U/L (ref 7–52)
ANION GAP SERPL CALCULATED.3IONS-SCNC: 10 MMOL/L
APTT PPP: 34 SECONDS (ref 23–37)
AST SERPL W P-5'-P-CCNC: 9 U/L (ref 13–39)
BACTERIA UR QL AUTO: ABNORMAL /HPF
BASOPHILS # BLD AUTO: 0.03 THOUSANDS/ÂΜL (ref 0–0.1)
BASOPHILS NFR BLD AUTO: 0 % (ref 0–1)
BILIRUB SERPL-MCNC: 0.68 MG/DL (ref 0.2–1)
BILIRUB UR QL STRIP: NEGATIVE
BILIRUB UR QL STRIP: NEGATIVE
BUN SERPL-MCNC: 29 MG/DL (ref 5–25)
CALCIUM SERPL-MCNC: 9.6 MG/DL (ref 8.4–10.2)
CARDIAC TROPONIN I PNL SERPL HS: 4 NG/L
CARDIAC TROPONIN I PNL SERPL HS: 4 NG/L
CARDIAC TROPONIN I PNL SERPL HS: 5 NG/L
CHLORIDE SERPL-SCNC: 97 MMOL/L (ref 96–108)
CLARITY UR: CLEAR
CLARITY UR: CLEAR
CO2 SERPL-SCNC: 29 MMOL/L (ref 21–32)
COLOR UR: YELLOW
COLOR UR: YELLOW
CREAT SERPL-MCNC: 1.77 MG/DL (ref 0.6–1.3)
CREAT UR-MCNC: 44.4 MG/DL
EOSINOPHIL # BLD AUTO: 0.07 THOUSAND/ÂΜL (ref 0–0.61)
EOSINOPHIL NFR BLD AUTO: 1 % (ref 0–6)
ERYTHROCYTE [DISTWIDTH] IN BLOOD BY AUTOMATED COUNT: 13.6 % (ref 11.6–15.1)
FLUAV RNA RESP QL NAA+PROBE: NEGATIVE
FLUBV RNA RESP QL NAA+PROBE: NEGATIVE
GFR SERPL CREATININE-BSD FRML MDRD: 26 ML/MIN/1.73SQ M
GLUCOSE SERPL-MCNC: 135 MG/DL (ref 65–140)
GLUCOSE UR STRIP-MCNC: NEGATIVE MG/DL
GLUCOSE UR STRIP-MCNC: NEGATIVE MG/DL
HCT VFR BLD AUTO: 42.3 % (ref 34.8–46.1)
HGB BLD-MCNC: 14.1 G/DL (ref 11.5–15.4)
HGB UR QL STRIP.AUTO: NEGATIVE
HGB UR QL STRIP.AUTO: NEGATIVE
IMM GRANULOCYTES # BLD AUTO: 0.04 THOUSAND/UL (ref 0–0.2)
IMM GRANULOCYTES NFR BLD AUTO: 0 % (ref 0–2)
INR PPP: 1.06 (ref 0.84–1.19)
KETONES UR STRIP-MCNC: NEGATIVE MG/DL
KETONES UR STRIP-MCNC: NEGATIVE MG/DL
LACTATE SERPL-SCNC: 1 MMOL/L (ref 0.5–2)
LEUKOCYTE ESTERASE UR QL STRIP: NEGATIVE
LEUKOCYTE ESTERASE UR QL STRIP: NEGATIVE
LIPASE SERPL-CCNC: 7 U/L (ref 11–82)
LYMPHOCYTES # BLD AUTO: 0.79 THOUSANDS/ÂΜL (ref 0.6–4.47)
LYMPHOCYTES NFR BLD AUTO: 8 % (ref 14–44)
MAGNESIUM SERPL-MCNC: 1.7 MG/DL (ref 1.9–2.7)
MCH RBC QN AUTO: 29.3 PG (ref 26.8–34.3)
MCHC RBC AUTO-ENTMCNC: 33.3 G/DL (ref 31.4–37.4)
MCV RBC AUTO: 88 FL (ref 82–98)
MONOCYTES # BLD AUTO: 0.47 THOUSAND/ÂΜL (ref 0.17–1.22)
MONOCYTES NFR BLD AUTO: 5 % (ref 4–12)
NEUTROPHILS # BLD AUTO: 8.49 THOUSANDS/ÂΜL (ref 1.85–7.62)
NEUTS SEG NFR BLD AUTO: 86 % (ref 43–75)
NITRITE UR QL STRIP: NEGATIVE
NITRITE UR QL STRIP: NEGATIVE
NON-SQ EPI CELLS URNS QL MICRO: ABNORMAL /HPF
NRBC BLD AUTO-RTO: 0 /100 WBCS
PH UR STRIP.AUTO: 5.5 [PH]
PH UR STRIP.AUTO: 6 [PH]
PLATELET # BLD AUTO: 329 THOUSANDS/UL (ref 149–390)
PMV BLD AUTO: 10.4 FL (ref 8.9–12.7)
POTASSIUM SERPL-SCNC: 3.2 MMOL/L (ref 3.5–5.3)
PROCALCITONIN SERPL-MCNC: 0.13 NG/ML
PROT SERPL-MCNC: 7.2 G/DL (ref 6.4–8.4)
PROT UR STRIP-MCNC: NEGATIVE MG/DL
PROT UR STRIP-MCNC: NEGATIVE MG/DL
PROT UR-MCNC: 12 MG/DL
PROT/CREAT UR: 0.27 MG/G{CREAT} (ref 0–0.1)
PROTHROMBIN TIME: 14 SECONDS (ref 11.6–14.5)
RBC # BLD AUTO: 4.81 MILLION/UL (ref 3.81–5.12)
RBC #/AREA URNS AUTO: ABNORMAL /HPF
RSV RNA RESP QL NAA+PROBE: NEGATIVE
SARS-COV-2 RNA RESP QL NAA+PROBE: NEGATIVE
SODIUM 24H UR-SCNC: 46 MOL/L
SODIUM SERPL-SCNC: 136 MMOL/L (ref 135–147)
SP GR UR STRIP.AUTO: 1.01 (ref 1–1.03)
SP GR UR STRIP.AUTO: <=1.005 (ref 1–1.03)
UROBILINOGEN UR QL STRIP.AUTO: 0.2 E.U./DL
UROBILINOGEN UR QL STRIP.AUTO: 0.2 E.U./DL
WBC # BLD AUTO: 9.89 THOUSAND/UL (ref 4.31–10.16)
WBC #/AREA URNS AUTO: ABNORMAL /HPF

## 2023-08-13 PROCEDURE — 81003 URINALYSIS AUTO W/O SCOPE: CPT | Performed by: PHYSICIAN ASSISTANT

## 2023-08-13 PROCEDURE — 83605 ASSAY OF LACTIC ACID: CPT | Performed by: PHYSICIAN ASSISTANT

## 2023-08-13 PROCEDURE — 84145 PROCALCITONIN (PCT): CPT | Performed by: PHYSICIAN ASSISTANT

## 2023-08-13 PROCEDURE — 83690 ASSAY OF LIPASE: CPT | Performed by: PHYSICIAN ASSISTANT

## 2023-08-13 PROCEDURE — 84300 ASSAY OF URINE SODIUM: CPT | Performed by: INTERNAL MEDICINE

## 2023-08-13 PROCEDURE — G1004 CDSM NDSC: HCPCS

## 2023-08-13 PROCEDURE — 85610 PROTHROMBIN TIME: CPT | Performed by: PHYSICIAN ASSISTANT

## 2023-08-13 PROCEDURE — 74176 CT ABD & PELVIS W/O CONTRAST: CPT

## 2023-08-13 PROCEDURE — 87040 BLOOD CULTURE FOR BACTERIA: CPT | Performed by: PHYSICIAN ASSISTANT

## 2023-08-13 PROCEDURE — 96365 THER/PROPH/DIAG IV INF INIT: CPT

## 2023-08-13 PROCEDURE — 99285 EMERGENCY DEPT VISIT HI MDM: CPT

## 2023-08-13 PROCEDURE — 85025 COMPLETE CBC W/AUTO DIFF WBC: CPT | Performed by: PHYSICIAN ASSISTANT

## 2023-08-13 PROCEDURE — 83735 ASSAY OF MAGNESIUM: CPT | Performed by: PHYSICIAN ASSISTANT

## 2023-08-13 PROCEDURE — 87086 URINE CULTURE/COLONY COUNT: CPT | Performed by: INTERNAL MEDICINE

## 2023-08-13 PROCEDURE — 82570 ASSAY OF URINE CREATININE: CPT | Performed by: INTERNAL MEDICINE

## 2023-08-13 PROCEDURE — 36415 COLL VENOUS BLD VENIPUNCTURE: CPT | Performed by: PHYSICIAN ASSISTANT

## 2023-08-13 PROCEDURE — 80053 COMPREHEN METABOLIC PANEL: CPT | Performed by: PHYSICIAN ASSISTANT

## 2023-08-13 PROCEDURE — 99285 EMERGENCY DEPT VISIT HI MDM: CPT | Performed by: PHYSICIAN ASSISTANT

## 2023-08-13 PROCEDURE — 96368 THER/DIAG CONCURRENT INF: CPT

## 2023-08-13 PROCEDURE — 93005 ELECTROCARDIOGRAM TRACING: CPT

## 2023-08-13 PROCEDURE — 84484 ASSAY OF TROPONIN QUANT: CPT | Performed by: PHYSICIAN ASSISTANT

## 2023-08-13 PROCEDURE — 84156 ASSAY OF PROTEIN URINE: CPT | Performed by: INTERNAL MEDICINE

## 2023-08-13 PROCEDURE — 0241U HB NFCT DS VIR RESP RNA 4 TRGT: CPT | Performed by: INTERNAL MEDICINE

## 2023-08-13 PROCEDURE — 85730 THROMBOPLASTIN TIME PARTIAL: CPT | Performed by: PHYSICIAN ASSISTANT

## 2023-08-13 PROCEDURE — 96375 TX/PRO/DX INJ NEW DRUG ADDON: CPT

## 2023-08-13 PROCEDURE — 81001 URINALYSIS AUTO W/SCOPE: CPT | Performed by: INTERNAL MEDICINE

## 2023-08-13 PROCEDURE — 99223 1ST HOSP IP/OBS HIGH 75: CPT | Performed by: INTERNAL MEDICINE

## 2023-08-13 PROCEDURE — 96366 THER/PROPH/DIAG IV INF ADDON: CPT

## 2023-08-13 RX ORDER — LANOLIN ALCOHOL/MO/W.PET/CERES
400 CREAM (GRAM) TOPICAL 2 TIMES DAILY
Status: COMPLETED | OUTPATIENT
Start: 2023-08-13 | End: 2023-08-13

## 2023-08-13 RX ORDER — SODIUM CHLORIDE 9 MG/ML
100 INJECTION, SOLUTION INTRAVENOUS CONTINUOUS
Status: DISCONTINUED | OUTPATIENT
Start: 2023-08-13 | End: 2023-08-13

## 2023-08-13 RX ORDER — ONDANSETRON 2 MG/ML
4 INJECTION INTRAMUSCULAR; INTRAVENOUS ONCE
Status: COMPLETED | OUTPATIENT
Start: 2023-08-13 | End: 2023-08-13

## 2023-08-13 RX ORDER — MAGNESIUM SULFATE HEPTAHYDRATE 40 MG/ML
2 INJECTION, SOLUTION INTRAVENOUS ONCE
Status: DISCONTINUED | OUTPATIENT
Start: 2023-08-13 | End: 2023-08-13

## 2023-08-13 RX ORDER — SODIUM CHLORIDE, SODIUM GLUCONATE, SODIUM ACETATE, POTASSIUM CHLORIDE, MAGNESIUM CHLORIDE, SODIUM PHOSPHATE, DIBASIC, AND POTASSIUM PHOSPHATE .53; .5; .37; .037; .03; .012; .00082 G/100ML; G/100ML; G/100ML; G/100ML; G/100ML; G/100ML; G/100ML
100 INJECTION, SOLUTION INTRAVENOUS CONTINUOUS
Status: DISCONTINUED | OUTPATIENT
Start: 2023-08-13 | End: 2023-08-13

## 2023-08-13 RX ORDER — SODIUM CHLORIDE 9 MG/ML
125 INJECTION, SOLUTION INTRAVENOUS CONTINUOUS
Status: DISCONTINUED | OUTPATIENT
Start: 2023-08-13 | End: 2023-08-13

## 2023-08-13 RX ORDER — METRONIDAZOLE 500 MG/100ML
500 INJECTION, SOLUTION INTRAVENOUS EVERY 8 HOURS
Status: DISCONTINUED | OUTPATIENT
Start: 2023-08-14 | End: 2023-08-16 | Stop reason: HOSPADM

## 2023-08-13 RX ORDER — SODIUM CHLORIDE, SODIUM GLUCONATE, SODIUM ACETATE, POTASSIUM CHLORIDE, MAGNESIUM CHLORIDE, SODIUM PHOSPHATE, DIBASIC, AND POTASSIUM PHOSPHATE .53; .5; .37; .037; .03; .012; .00082 G/100ML; G/100ML; G/100ML; G/100ML; G/100ML; G/100ML; G/100ML
125 INJECTION, SOLUTION INTRAVENOUS CONTINUOUS
Status: DISCONTINUED | OUTPATIENT
Start: 2023-08-13 | End: 2023-08-14

## 2023-08-13 RX ORDER — DOCUSATE SODIUM 100 MG/1
100 CAPSULE, LIQUID FILLED ORAL 2 TIMES DAILY PRN
Status: DISCONTINUED | OUTPATIENT
Start: 2023-08-13 | End: 2023-08-14

## 2023-08-13 RX ORDER — ALPRAZOLAM 0.25 MG/1
0.25 TABLET ORAL 2 TIMES DAILY PRN
Status: DISCONTINUED | OUTPATIENT
Start: 2023-08-13 | End: 2023-08-16 | Stop reason: HOSPADM

## 2023-08-13 RX ORDER — POTASSIUM CHLORIDE 14.9 MG/ML
20 INJECTION INTRAVENOUS ONCE
Status: COMPLETED | OUTPATIENT
Start: 2023-08-13 | End: 2023-08-14

## 2023-08-13 RX ORDER — HYDROCHLOROTHIAZIDE 25 MG/1
25 TABLET ORAL DAILY
COMMUNITY
End: 2023-08-16

## 2023-08-13 RX ORDER — LISINOPRIL 20 MG/1
20 TABLET ORAL DAILY
Status: DISCONTINUED | OUTPATIENT
Start: 2023-08-14 | End: 2023-08-13

## 2023-08-13 RX ORDER — ONDANSETRON 2 MG/ML
4 INJECTION INTRAMUSCULAR; INTRAVENOUS EVERY 6 HOURS PRN
Status: DISCONTINUED | OUTPATIENT
Start: 2023-08-13 | End: 2023-08-16 | Stop reason: HOSPADM

## 2023-08-13 RX ORDER — CEFTRIAXONE 2 G/50ML
2000 INJECTION, SOLUTION INTRAVENOUS ONCE
Status: COMPLETED | OUTPATIENT
Start: 2023-08-13 | End: 2023-08-13

## 2023-08-13 RX ORDER — METRONIDAZOLE 500 MG/100ML
500 INJECTION, SOLUTION INTRAVENOUS ONCE
Status: COMPLETED | OUTPATIENT
Start: 2023-08-13 | End: 2023-08-14

## 2023-08-13 RX ORDER — LEVOTHYROXINE SODIUM 88 UG/1
88 TABLET ORAL
Status: DISCONTINUED | OUTPATIENT
Start: 2023-08-13 | End: 2023-08-16 | Stop reason: HOSPADM

## 2023-08-13 RX ORDER — CEFTRIAXONE 2 G/50ML
2000 INJECTION, SOLUTION INTRAVENOUS EVERY 24 HOURS
Status: DISCONTINUED | OUTPATIENT
Start: 2023-08-14 | End: 2023-08-16 | Stop reason: HOSPADM

## 2023-08-13 RX ORDER — INSULIN LISPRO 100 [IU]/ML
1-5 INJECTION, SOLUTION INTRAVENOUS; SUBCUTANEOUS
Status: DISCONTINUED | OUTPATIENT
Start: 2023-08-14 | End: 2023-08-16 | Stop reason: HOSPADM

## 2023-08-13 RX ORDER — CALCIUM CARBONATE 500 MG/1
1000 TABLET, CHEWABLE ORAL DAILY PRN
Status: DISCONTINUED | OUTPATIENT
Start: 2023-08-13 | End: 2023-08-16 | Stop reason: HOSPADM

## 2023-08-13 RX ORDER — LEVOTHYROXINE SODIUM 88 UG/1
88 TABLET ORAL DAILY
COMMUNITY
Start: 2023-03-17

## 2023-08-13 RX ORDER — ACETAMINOPHEN 325 MG/1
650 TABLET ORAL EVERY 6 HOURS PRN
Status: DISCONTINUED | OUTPATIENT
Start: 2023-08-13 | End: 2023-08-16 | Stop reason: HOSPADM

## 2023-08-13 RX ORDER — HEPARIN SODIUM 5000 [USP'U]/ML
5000 INJECTION, SOLUTION INTRAVENOUS; SUBCUTANEOUS EVERY 8 HOURS SCHEDULED
Status: DISCONTINUED | OUTPATIENT
Start: 2023-08-13 | End: 2023-08-16 | Stop reason: HOSPADM

## 2023-08-13 RX ORDER — SIMVASTATIN 40 MG
40 TABLET ORAL
COMMUNITY

## 2023-08-13 RX ADMIN — POTASSIUM CHLORIDE 20 MEQ: 14.9 INJECTION, SOLUTION INTRAVENOUS at 16:28

## 2023-08-13 RX ADMIN — HEPARIN SODIUM 5000 UNITS: 5000 INJECTION INTRAVENOUS; SUBCUTANEOUS at 22:14

## 2023-08-13 RX ADMIN — Medication 400 MG: at 20:05

## 2023-08-13 RX ADMIN — LEVOTHYROXINE SODIUM 88 MCG: 88 TABLET ORAL at 18:51

## 2023-08-13 RX ADMIN — METRONIDAZOLE 500 MG: 500 INJECTION, SOLUTION INTRAVENOUS at 17:22

## 2023-08-13 RX ADMIN — MORPHINE SULFATE 2 MG: 2 INJECTION, SOLUTION INTRAMUSCULAR; INTRAVENOUS at 15:37

## 2023-08-13 RX ADMIN — CEFTRIAXONE 2000 MG: 2 INJECTION, SOLUTION INTRAVENOUS at 16:28

## 2023-08-13 RX ADMIN — SODIUM CHLORIDE, SODIUM GLUCONATE, SODIUM ACETATE, POTASSIUM CHLORIDE, MAGNESIUM CHLORIDE, SODIUM PHOSPHATE, DIBASIC, AND POTASSIUM PHOSPHATE 100 ML/HR: .53; .5; .37; .037; .03; .012; .00082 INJECTION, SOLUTION INTRAVENOUS at 15:37

## 2023-08-13 RX ADMIN — SODIUM CHLORIDE, SODIUM GLUCONATE, SODIUM ACETATE, POTASSIUM CHLORIDE AND MAGNESIUM CHLORIDE 125 ML/HR: 526; 502; 368; 37; 30 INJECTION, SOLUTION INTRAVENOUS at 20:04

## 2023-08-13 RX ADMIN — ONDANSETRON 4 MG: 2 INJECTION INTRAMUSCULAR; INTRAVENOUS at 15:37

## 2023-08-13 NOTE — ASSESSMENT & PLAN NOTE
Recent Labs     08/13/23  1540 08/14/23  0517   CREATININE 1.77* 1.52*   EGFR 26 31     Estimated Creatinine Clearance: 27.3 mL/min (A) (by C-G formula based on SCr of 1.52 mg/dL (H)). Baseline around 0.9.    KAYLA secondary to dehydration due to poor PO intake  Appear dehydrated on admission    Sr. Creatininie improving  Continue IVF   Follow BMP  Avoid hypotension  Avoid nephrotoxic agents

## 2023-08-13 NOTE — INCIDENTAL FINDINGS
The following findings require follow up:  Radiographic finding   Finding:     CT abdomen 08/13/2023:    LIVER/BILIARY TREE: Previously seen 5 x 4 ovoid partially calcified mass at the hepatic dome appears similar to the prior exams from this institution and similar to description from prior outside report; images not available. CT abdomen 08/05/2023:  LIVER/BILIARY TREE: Oval 5 cm x 3.8 cm partially calcified mass at the right hepatic dome with surrounding hypodense rim appears unchanged (Note: Discrepancy in size from that reported on prior is due to inclusion of hypoechoic rim in previous   measurements). No new masses. No biliary ductal dilatation. Hepatic contour is smooth.       Follow up required: yes   Follow up should be done within 3 month(s)    Please notify the following clinician to assist with the follow up: PCP

## 2023-08-13 NOTE — ASSESSMENT & PLAN NOTE
Repleted  Potassium 3.2 on arrival; Received IV supplements  Replete and keep K at 4  Follow Potassium level

## 2023-08-13 NOTE — ASSESSMENT & PLAN NOTE
Background: Recurrent episodes of Diverticulitis. She was in the ED one week ago. Discharged home on Ciprofloxacin and Metronidazole. Patient did not take Metronidazole initially. During PCP follow up visit on 08/11/23, antibiotics changed to Augmentin and also advised to take metronidazole. Also reports poor PO intake because of nausea. Today, she presented to the ED with worsening abdominal pian and nausea. CT abdomen 08/13/23:   - There is colonic diverticulosis. Interval decrease in the amount of fluid and fat stranding in the left lower quadrant  - There is interval development of inflammatory changes surrounding a jejunal diverticulum in the left lower quadrant which is filled with debris and demonstrates a thick wall concerning for jejunal diverticulitis. A second enlarged jejunal diverticula which does not demonstrate inflammatory changes is seen on.     Received IV Ceftriaxone and metronidazole in the ED once  Received IV Zofran for nausea in the ED    Start ceftriaxone IV 2 gm q 24 hr and Metronidazole  mg q8h  IV Zofran for nausea  IVF for hydration  Will consult general surgery  NPO  I&O's  MOnitor vitals

## 2023-08-13 NOTE — ASSESSMENT & PLAN NOTE
Sinus bradycardia  Present at the time of admission.   Hold metoprolol  Telemetry monitoring -   Monitor vitals

## 2023-08-13 NOTE — ED PROVIDER NOTES
History  Chief Complaint   Patient presents with   • Abdominal Pain     Patient states "my diverticulitis is acting up"; was seen here last Saturday; LLQ pain, not eating/drinking per family     79year old female with PMH HTN, HLD, DM, TIA presents ambulatory from home accompanied by family for evaluation of abdominal pain. Pt reports "my diverticulitis is acting up". Pt and family note she has had current episode about the past 11 days. She reports pain in LLQ of abdomen. She reports associated nausea. Occasional vomiting. Denies diarrhea. Reports constipation. Has had little to no appetite. Family notes over the past few days she hasn't been eating or drinking. Family notes she usually has about 3-4 episodes of diverticulitis per year. Denies fever, chills. Reports generalized fatigue. Denies chest pain, SOB. She has been urinating however notes little amounts and darker in color. No reported aggravating or alleviating factors. Was seen here last week for the same. Was diagnosed with diverticulitis. Has been on antibiotics. Reports no change in symptoms. Family notes she was on cipro from PCP which she started taking prior to her ED visit last week. Didn't take flagyl initially. Was switched here to augmentin but family notes she did not take due to concerns regarding possible C.diff. Started on the augmentin as of this past Friday along with previously prescribed flagyl script once the cipro was completed. Prior abdominal surgeries include cholecystectomy. She reports having colonoscopies in the past but was told by her GI that he wouldn't do them anymore as she was too high risk for complication.       History provided by:  Patient, medical records and relative   used: No    Abdominal Pain  Pain location:  LLQ  Pain quality: shooting    Pain quality: not sharp    Pain radiates to:  Does not radiate  Duration:  11 days  Timing:  Constant  Progression: Unchanged  Chronicity:  Recurrent  Associated symptoms: constipation, fatigue, nausea and vomiting    Associated symptoms: no chest pain, no chills, no cough, no diarrhea, no dysuria, no fever, no hematuria, no shortness of breath and no sore throat        Prior to Admission Medications   Prescriptions Last Dose Informant Patient Reported? Taking? ALPRAZolam (XANAX) 0.25 mg tablet   Yes No   Sig: Take 0.125-0.25 mg by mouth 2 (two) times a day as needed   Ibuprofen 200 MG CAPS   Yes No   Sig: Take by mouth   acetaminophen (TYLENOL) 500 mg tablet   Yes No   Sig: Take by mouth   colchicine (COLCRYS) 0.6 mg tablet   Yes No   Sig: Take 0.6 mg by mouth 2 (two) times a day   enalapril (VASOTEC) 10 mg tablet   Yes No   Sig: Take 1 tablet by mouth daily   ergocalciferol (ERGOCALCIFEROL) 1.25 MG (93840 UT) capsule   Yes No   Sig: Take 50,000 Units by mouth   ondansetron (ZOFRAN) 4 mg tablet   No No   Sig: Take 1 tablet (4 mg total) by mouth every 8 (eight) hours as needed for nausea or vomiting      Facility-Administered Medications: None       Past Medical History:   Diagnosis Date   • Diverticulitis    • Hypertension    • TIA (transient ischemic attack)        Past Surgical History:   Procedure Laterality Date   • COLECTOMY         History reviewed. No pertinent family history. I have reviewed and agree with the history as documented. E-Cigarette/Vaping   • E-Cigarette Use Never User      E-Cigarette/Vaping Substances     Social History     Tobacco Use   • Smoking status: Every Day     Packs/day: 0.25     Types: Cigarettes   • Smokeless tobacco: Never   Vaping Use   • Vaping Use: Never used   Substance Use Topics   • Drug use: Never       Review of Systems   Constitutional: Positive for appetite change and fatigue. Negative for chills and fever. HENT: Negative. Negative for congestion, rhinorrhea and sore throat. Eyes: Negative. Negative for visual disturbance. Respiratory: Negative.   Negative for cough, shortness of breath and wheezing. Cardiovascular: Negative. Negative for chest pain, palpitations and leg swelling. Gastrointestinal: Positive for abdominal pain, constipation, nausea and vomiting. Negative for diarrhea. Genitourinary: Positive for decreased urine volume. Negative for dysuria, flank pain, frequency and hematuria. Musculoskeletal: Negative. Negative for back pain and myalgias. Skin: Negative. Negative for rash. Neurological: Negative. Negative for dizziness, light-headedness and headaches. Psychiatric/Behavioral: Negative. All other systems reviewed and are negative. Physical Exam  Physical Exam  Vitals and nursing note reviewed. Constitutional:       General: She is awake. She is not in acute distress. Appearance: She is well-developed. She is not toxic-appearing or diaphoretic. HENT:      Head: Normocephalic and atraumatic. Right Ear: Hearing and external ear normal.      Left Ear: Hearing and external ear normal.      Nose: Nose normal.      Mouth/Throat:      Mouth: Mucous membranes are moist.      Pharynx: Oropharynx is clear. Uvula midline. Eyes:      General: Lids are normal. No scleral icterus. Conjunctiva/sclera: Conjunctivae normal.      Pupils: Pupils are equal, round, and reactive to light. Neck:      Trachea: Trachea and phonation normal. No tracheal deviation. Cardiovascular:      Rate and Rhythm: Normal rate and regular rhythm. Pulses: Normal pulses. Radial pulses are 2+ on the right side and 2+ on the left side. Dorsalis pedis pulses are 2+ on the right side and 2+ on the left side. Posterior tibial pulses are 2+ on the right side and 2+ on the left side. Heart sounds: Normal heart sounds, S1 normal and S2 normal.   Pulmonary:      Effort: Pulmonary effort is normal. No tachypnea or respiratory distress. Breath sounds: Normal breath sounds. No wheezing, rhonchi or rales.    Abdominal:      General: Bowel sounds are normal. There is no distension. Palpations: Abdomen is soft. Tenderness: There is abdominal tenderness in the left lower quadrant. There is guarding. There is no right CVA tenderness, left CVA tenderness or rebound. Musculoskeletal:         General: No tenderness. Normal range of motion. Right lower leg: No edema. Left lower leg: No edema. Skin:     General: Skin is warm and dry. Capillary Refill: Capillary refill takes less than 2 seconds. Findings: No rash. Neurological:      General: No focal deficit present. Mental Status: She is alert and oriented to person, place, and time. GCS: GCS eye subscore is 4. GCS verbal subscore is 5. GCS motor subscore is 6. Motor: No abnormal muscle tone. Psychiatric:         Mood and Affect: Mood normal.         Speech: Speech normal.         Behavior: Behavior normal. Behavior is cooperative.          Vital Signs  ED Triage Vitals   Temperature Pulse Respirations Blood Pressure SpO2   08/13/23 1510 08/13/23 1510 08/13/23 1510 08/13/23 1513 08/13/23 1510   98.2 °F (36.8 °C) 71 16 130/69 96 %      Temp Source Heart Rate Source Patient Position - Orthostatic VS BP Location FiO2 (%)   08/13/23 1510 08/13/23 1510 08/13/23 1630 08/13/23 1630 --   Temporal Monitor Lying Left arm       Pain Score       08/13/23 1510       8           Vitals:    08/13/23 1510 08/13/23 1513 08/13/23 1630   BP:  130/69 111/55   Pulse: 71  (!) 54   Patient Position - Orthostatic VS:   Lying         Visual Acuity      ED Medications  Medications   multi-electrolyte (PLASMALYTE-A/ISOLYTE-S PH 7.4) IV solution (100 mL/hr Intravenous New Bag 8/13/23 1537)   potassium chloride 20 mEq IVPB (premix) (20 mEq Intravenous New Bag 8/13/23 1628)   metroNIDAZOLE (FLAGYL) IVPB (premix) 500 mg 100 mL (500 mg Intravenous New Bag 8/13/23 1722)   ondansetron (ZOFRAN) injection 4 mg (4 mg Intravenous Given 8/13/23 1537)   morphine injection 2 mg (2 mg Intravenous Given 8/13/23 1537)   cefTRIAXone (ROCEPHIN) IVPB (premix in dextrose) 2,000 mg 50 mL (0 mg Intravenous Stopped 8/13/23 1658)       Diagnostic Studies  Results Reviewed     Procedure Component Value Units Date/Time    UA w Reflex to Microscopic w Reflex to Culture [[id number]] Collected: 08/13/23 1716    Lab Status: Final result Specimen: Urine, Clean Catch Updated: 08/13/23 1726     Color, UA Yellow     Clarity, UA Clear     Specific Gravity, UA <=1.005     pH, UA 6.0     Leukocytes, UA Negative     Nitrite, UA Negative     Protein, UA Negative mg/dl      Glucose, UA Negative mg/dl      Ketones, UA Negative mg/dl      Urobilinogen, UA 0.2 E.U./dl      Bilirubin, UA Negative     Occult Blood, UA Negative    Urine culture [[id number]] Collected: 08/13/23 1717    Lab Status: In process Specimen: Urine, Clean Catch Updated: 08/13/23 1719    Urinalysis with microscopic [[id number]] Collected: 08/13/23 1717    Lab Status:  In process Specimen: Urine, Clean Catch Updated: 08/13/23 1719    COVID19, Influenza A/B, RSV PCR, [facility] [[id number]]     Lab Status: No result Specimen: Nares from Nasopharyngeal Swab     Magnesium [[id number]]     Lab Status: No result Specimen: Blood     Procalcitonin [[id number]]  (Normal) Collected: 08/13/23 1540    Lab Status: Final result Specimen: Blood from Arm, Right Updated: 08/13/23 1616     Procalcitonin 0.13 ng/ml     HS Troponin 0hr (reflex protocol) [[id number]]  (Normal) Collected: 08/13/23 1540    Lab Status: Final result Specimen: Blood from Arm, Right Updated: 08/13/23 1613     hs TnI 0hr 5 ng/L     HS Troponin I 2hr [[id number]]     Lab Status: No result Specimen: Blood     HS Troponin I 4hr [[id number]]     Lab Status: No result Specimen: Blood     Lactic acid, plasma (w/reflex if result > 2.0) [[id number]]  (Normal) Collected: 08/13/23 1540    Lab Status: Final result Specimen: Blood from Arm, Right Updated: 08/13/23 1608     LACTIC ACID 1.0 mmol/L     Narrative:      Result may be elevated if tourniquet was used during collection.     Lipase [[id number]]  (Abnormal) Collected: 08/13/23 1540    Lab Status: Final result Specimen: Blood from Arm, Right Updated: 08/13/23 1606     Lipase 7 u/L     Comprehensive metabolic panel [[id number]]  (Abnormal) Collected: 08/13/23 1540    Lab Status: Final result Specimen: Blood from Arm, Right Updated: 08/13/23 1606     Sodium 136 mmol/L      Potassium 3.2 mmol/L      Chloride 97 mmol/L      CO2 29 mmol/L      ANION GAP 10 mmol/L      BUN 29 mg/dL      Creatinine 1.77 mg/dL      Glucose 135 mg/dL      Calcium 9.6 mg/dL      AST 9 U/L      ALT 6 U/L      Alkaline Phosphatase 65 U/L      Total Protein 7.2 g/dL      Albumin 3.8 g/dL      Total Bilirubin 0.68 mg/dL      eGFR 26 ml/min/1.73sq m     Narrative:      [facility] guidelines for Chronic Kidney Disease (CKD):   •  Stage 1 with normal or high GFR (GFR > 90 mL/min/1.73 square meters)  •  Stage 2 Mild CKD (GFR = 60-89 mL/min/1.73 square meters)  •  Stage 3A Moderate CKD (GFR = 45-59 mL/min/1.73 square meters)  •  Stage 3B Moderate CKD (GFR = 30-44 mL/min/1.73 square meters)  •  Stage 4 Severe CKD (GFR = 15-29 mL/min/1.73 square meters)  •  Stage 5 End Stage CKD (GFR <15 mL/min/1.73 square meters)  Note: GFR calculation is accurate only with a steady state creatinine    Protime-INR [[id number]]  (Normal) Collected: 08/13/23 1540    Lab Status: Final result Specimen: Blood from Arm, Right Updated: 08/13/23 1602     Protime 14.0 seconds      INR 1.06    APTT [[id number]]  (Normal) Collected: 08/13/23 1540    Lab Status: Final result Specimen: Blood from Arm, Right Updated: 08/13/23 1602     PTT 34 seconds     CBC and differential [[id number]]  (Abnormal) Collected: 08/13/23 1540    Lab Status: Final result Specimen: Blood from Arm, Right Updated: 08/13/23 1550     WBC 9.89 Thousand/uL      RBC 4.81 Million/uL      Hemoglobin 14.1 g/dL      Hematocrit 42.3 %      MCV 88 fL      MCH 29.3 pg      MCHC 33.3 g/dL      RDW 13.6 %      MPV 10.4 fL      Platelets 168 Thousands/uL      nRBC 0 /100 WBCs      Neutrophils Relative 86 %      Immat GRANS % 0 %      Lymphocytes Relative 8 %      Monocytes Relative 5 %      Eosinophils Relative 1 %      Basophils Relative 0 %      Neutrophils Absolute 8.49 Thousands/µL      Immature Grans Absolute 0.04 Thousand/uL      Lymphocytes Absolute 0.79 Thousands/µL      Monocytes Absolute 0.47 Thousand/µL      Eosinophils Absolute 0.07 Thousand/µL      Basophils Absolute 0.03 Thousands/µL     Blood culture #1 [[id number]] Collected: 08/13/23 1540    Lab Status: In process Specimen: Blood from Arm, Right Updated: 08/13/23 1546    Blood culture #2 [[id number]] Collected: 08/13/23 1540    Lab Status: In process Specimen: Blood from Arm, Right Updated: 08/13/23 1546                 CT abdomen pelvis wo contrast   Final Result by Lai Rodas MD (08/13 1707)      There is colonic diverticulosis. Interval decrease in the amount of fluid and fat stranding in the left lower quadrant. .      There is interval development of inflammatory changes surrounding a jejunal diverticulum in the left lower quadrant which is filled with debris and demonstrates a thick wall concerning for jejunal diverticulitis, 601:59. . A second enlarged jejunal    diverticula which does not demonstrate inflammatory changes is seen on 601:57. The study was marked in [facility] for immediate notification.                Workstation performed: [id number]                    Procedures  ECG 12 Lead Documentation Only    Date/Time: 8/13/2023 3:17 PM    Performed by: Lidia Klein PA-C  Authorized by: Lidia Klein PA-C    Indications / Diagnosis:  Weakness, abd pain  ECG reviewed by me, the ED Provider: yes    Patient location:  ED  Previous ECG:     Comparison to cardiac monitor: Yes    Interpretation:     Interpretation: non-specific    Rate:     ECG rate:  69    ECG rate assessment: normal    Rhythm: Rhythm: sinus rhythm    Ectopy:     Ectopy: none    QRS:     QRS axis:  Normal    QRS intervals:  Normal  Conduction:     Conduction: normal    ST segments:     ST segments:  Non-specific  T waves:     T waves: normal    Comments:      , QRS 88, QT/QTc 398/426; low voltage QRS    ECG 12 Lead Documentation Only    Date/Time: 8/13/2023 4:45 PM    Performed by: Nargis Leija PA-C  Authorized by: Nargis Leija PA-C    Indications / Diagnosis:  Chest pain  ECG reviewed by me, the ED Provider: yes    Patient location:  ED  Previous ECG:     Previous ECG:  Compared to current    Comparison to cardiac monitor: Yes    Interpretation:     Interpretation: abnormal    Rate:     ECG rate:  67    ECG rate assessment: normal    Rhythm:     Rhythm: sinus rhythm    Ectopy:     Ectopy: none    QRS:     QRS axis:  Normal    QRS intervals:  Normal  Conduction:     Conduction: normal    ST segments:     ST segments:  Normal  T waves:     T waves: non-specific    Comments:      , QRS 86, QT/QTc 436/460; low voltage QRS             ED Course  ED Course as of 08/13/23 1727   Sun Aug 13, 2023   1509 Prior records reviewed. Was here on 8/5/23 and diagnosed with diverticulitis and discharged on augmentin, zofran. CT abd/pelv at that time showed - "Focal sigmoid diverticulitis without abscess or free air."   1525 Family notes she was on just cipro until this past Friday. Then started augmentin and flagyl.    1552 WBC: 9.89  Improved from 11.39 eight days ago   1552 Hemoglobin: 14.1   1552 Platelet Count: 497   1604 POCT INR: 1.06   1604 PROTIME: 14.0   1604 PTT: 34   1617 Lipase(!): 7  Below reference range   1617 hs TnI 0hr: 5  Not c/w ACS   1617 LACTIC ACID: 1.0   1617 Procalcitonin: 0.13   1618 Glucose, Random: 135   1618 Creatinine(!): 1.77  Increased from 0.99 eight days ago c/w KAYLA; fluids in process   1618 BUN(!): 29   1618 Sodium: 136   1618 Potassium(!): 3.2  Will check mag and replete   1618 Chloride: 97 1618 CO2: 29   1618 Anion Gap: 10   1618 Calcium: 9.6   1618 AST(!): 9   1618 ALT(!): 6   1618 Alkaline Phosphatase: 65   1618 Total Protein: 7.2   1618 Albumin: 3.8   1618 TOTAL BILIRUBIN: 0.68   1618 eGFR: 26   1647 Pt had complaints of left sided chest pressure with the potassium infusion. Repeat EKG was obtained. 1709 CT abdomen pelvis wo contrast  IMPRESSION:     There is colonic diverticulosis. Interval decrease in the amount of fluid and fat stranding in the left lower quadrant. .     There is interval development of inflammatory changes surrounding a jejunal diverticulum in the left lower quadrant which is filled with debris and demonstrates a thick wall concerning for jejunal diverticulitis, 601:59. . A second enlarged jejunal   diverticula which does not demonstrate inflammatory changes is seen on 601:57.   1711 Pt ambulated to bathroom, steady gait. 1711 TT to on call SLIM to discuss admission. 80 Discussed with Dr. Eriberto Doss and accepts for full inpatient admission. 200 Pt and family updated on results. They are agreeable to admission/plan of care as previously discussed. Pt afebrile, hemodynamically stable. Reports pain feels mildly improved and she is less nauseated. 1724 UA w Reflex to Microscopic w Reflex to Culture  Unremarkable; UA not suggestive of infection             HEART Risk Score    Flowsheet Row Most Recent Value   Heart Score Risk Calculator    History 0 Filed at: 08/13/2023 1545   ECG 1 Filed at: 08/13/2023 1545   Age 2 Filed at: 08/13/2023 1545   Risk Factors 1 Filed at: 08/13/2023 1545   Troponin 0 Filed at: 08/13/2023 1545   HEART Score 4 Filed at: 08/13/2023 1545                     Initial Sepsis Screening     Row Name 08/13/23 1621                Is the patient's history suggestive of a new or worsening infection?  Yes (Proceed)  -KO        Suspected source of infection acute abdominal infection  -KO        Indicate SIRS criteria --        Are two or more of the above signs & symptoms of infection both present and new to the patient? No  -KO              User Key  (r) = Recorded By, (t) = Taken By, (c) = Cosigned By    [address] [facility] Name Provider Type    ALEXI Mayers PA-C Physician Assistant                SBIRT 22yo+    Flowsheet Row Most Recent Value   Initial Alcohol Screen: US AUDIT-C     1. How often do you have a drink containing alcohol? 0 Filed at: 08/13/2023 1512   2. How many drinks containing alcohol do you have on a typical day you are drinking? 0 Filed at: 08/13/2023 1512   3b. FEMALE Any Age, or MALE 65+: How often do you have 4 or more drinks on one occassion? 0 Filed at: 08/13/2023 1512   Audit-C Score 0 Filed at: 08/13/2023 1512   MRAY: How many times in the past year have you. .. Used an illegal drug or used a prescription medication for non-medical reasons? Never Filed at: 08/13/2023 1512                    Medical Decision Making  80 yo female presenting for evaluation of persistent abdominal pain, nausea and no appetite. Recent diagnosis of sigmoid diverticulitis. On antibiotic therapy at home without improvement. Will repeat labs, urine and imaging. Given age, lack of improvement with outpatient management, discussed anticipated need for admission and pt/family agrees. Work up obtained as noted above. EKG and troponin not c/w ACS. No noted leukocytosis, no anemia. No hypo or hyperglycemia. There is increase in BUN and Cr from prior c/w KAYLA. This is likely in setting of poor PO intake. Fluids provided for dehydration. Mild hypokalemia was repleted. Pt was provided with IV Abx in the form of IV rocephin and flagyl. Repeat CT abd/pelv shows persistent and progress diverticulitis. No noted complication such as abscess or perforation. UA not suggestive of infection. SLIM consulted for admission. Pt and family comfortable with admission/plan of care. Pt admitted in stable condition.     Please refer to above ER course for further details/discussion. Acute diverticulitis: acute illness or injury  KAYLA (acute kidney injury) ([address]): acute illness or injury  Hypokalemia: acute illness or injury  LLQ abdominal pain: acute illness or injury  Nausea: acute illness or injury  Amount and/or Complexity of Data Reviewed  Independent Historian:      Details: family  External Data Reviewed: labs, radiology and notes. Labs: ordered. Decision-making details documented in ED Course. Radiology: ordered. Decision-making details documented in ED Course. ECG/medicine tests: ordered and independent interpretation performed. Decision-making details documented in ED Course. Discussion of management or test interpretation with external provider(s): SLIM    Risk  Prescription drug management. Decision regarding hospitalization. Disposition  Final diagnoses:   Acute diverticulitis   KAYLA (acute kidney injury) ([address])   Nausea   LLQ abdominal pain   Hypokalemia     Time reflects when diagnosis was documented in both MDM as applicable and the Disposition within this note     Time User Action Codes Description Comment    8/13/2023  5:10 PM Erna Chill Add [K57.92] Acute diverticulitis     8/13/2023  5:10 PM Erna Chill Add [N17.9] KAYLA (acute kidney injury) ([address])     8/13/2023  5:10 PM Erna Chill Add [R11.0] Nausea     8/13/2023  5:11 PM Erna Chill Add [R10.32] LLQ abdominal pain     8/13/2023  5:11 PM Erna Chill Add [E87.6] Hypokalemia       ED Disposition     ED Disposition   Admit    Condition   Stable    Date/Time   Sun Aug 13, 2023  5:15 PM    Comment   Case was discussed with Dr. Regi Macias and the patient's admission status was agreed to be Admission Status: inpatient status to the service of Dr. Regi Macias. Follow-up Information    None         Patient's Medications   Discharge Prescriptions    No medications on file       No discharge procedures on file.     PDMP Review     None          ED Provider  Electronically Signed by           Mateusz Bledsoe PA-C  08/13/23 [address] [place] [address] Brian Santiago PA-C  08/13/23 [id number]

## 2023-08-13 NOTE — H&P
[address]  H&P  Name: Antonio Mccloud 79 y.o. female I MRN: [id number]  Unit/Bed#: 247-24 I Date of Admission: 8/13/2023   Date of Service: 8/13/2023 I Hospital Day: 0      Assessment/Plan   * Diverticulitis  Assessment & Plan  Background: Recurrent episodes of Diverticulitis. She was in the ED one week ago. Discharged home on Ciprofloxacin and Metronidazole. Patient did not take Metronidazole initially. During PCP follow up visit on 08/11/23, antibiotics changed to Augmentin and also advised to take metronidazole. Also reports poor PO intake because of nausea. Today, she presented to the ED with worsening abdominal pian and nausea. CT abdomen 08/13/23:   - There is colonic diverticulosis. Interval decrease in the amount of fluid and fat stranding in the left lower quadrant  - There is interval development of inflammatory changes surrounding a jejunal diverticulum in the left lower quadrant which is filled with debris and demonstrates a thick wall concerning for jejunal diverticulitis. A second enlarged jejunal diverticula which does not demonstrate inflammatory changes is seen on. Received IV Ceftriaxone and metronidazole in the ED once  Received IV Zofran for nausea in the ED    Start ceftriaxone IV 2 gm q 24 hr and Metronidazole  mg q8h  IV Zofran for nausea  IVF for hydration  Will consult general surgery  NPO  I&O's  MOnitor vitals    KAYLA (acute kidney injury) [facility])  Assessment & Plan  Recent Labs     08/13/23  1540   CREATININE 1.77*   EGFR 26     Estimated Creatinine Clearance: 23.4 mL/min (A) (by C-G formula based on SCr of 1.77 mg/dL (H)). Baseline around 0.9.    KAYLA secondary to dehydration with poor PO intake  Appear dehydrated on exam  IVF for hydration  Follow BMP      Hypomagnesemia  Assessment & Plan  Replete and keep Mg at 2  Follow magnesium level    Constipation  Assessment & Plan  Intermittent constipation  Uses Metamucil as needed  Not on stool softeners at home  She might get benefited from stool softeners on discharge. Bradycardia  Assessment & Plan  Sinus bradycardia  Present at the time of admission. Hold metoprolol  Telemetry monitoring  Reassess tomorrow  Monitor vitals    Hypertension  Assessment & Plan  PTA medication: Enlapril 10 mg, metoprolol 12.5 mg, Hydrochlorothiazide 25 mg daily. Blood pressure in low normal range with bradycardia on admission  Will hold antihypertensive medications  Follow blood pressure trend    Hypothyroidism  Assessment & Plan  Continue home levothyroxine 88 mcg daily    H/O TIA (transient ischemic attack) and stroke  Assessment & Plan  Stable now  Continue Aspirin 81 mg daily    Type 2 diabetes mellitus ([address])  Assessment & Plan  Lab Results   Component Value Date    HGBA1C 6.0 (H) 07/26/2023       No results for input(s): "POCGLU" in the last 72 hours. Blood Sugar Average: Last 72 hrs:  Previously on Metformin, discontinued by her PCP during last visit. ISS with Accu check AC/HS  Hypoglycemia protocol    Hypokalemia  Assessment & Plan  Potassium 3.2 on arrival  Received IV supplements  Replete and keep K at 4  Follow Potassium level       VTE Prophylaxis: Heparin  / sequential compression device   Code Status: Level - 1 Full Code  POLST: There is no POLST form on file for this patient (pre-hospital)  Discussion with family: Discussed with  family at bedisde    Anticipated Length of Stay:  Patient will be admitted on an Inpatient basis with an anticipated length of stay of  > 2 midnights. Justification for Hospital Stay: Diverticulitis, KAYLA, bradycardia, hypokalemia, requiring IV antibiotics and IVF,potassium supplements and telemetry monitoring. Total Time for Visit, including Counseling / Coordination of Care: 60 minutes. Greater than 50% of this total time spent on direct patient counseling and coordination of care.     Chief Complaint:   Abdominal pain and nausea    History of Present Illness:    Carter Pardo is a 79 y.o. female with hypothyroidism, hypertension, type 2 diabetes mellitus, history of TIA who presents with abdominal pain associated with nausea and vomiting. Patient has a history of recurrent diverticulitis. She was in the [address] [place] [address] ED 1 week ago with similar complaints. Was discharged on p.o. ciprofloxacin and metronidazole. Patient reports that she is not taking metronidazole because of nausea. She was seen by her PCP on 8/11/2023. PCP changed the antibiotics to Augmentin and metronidazole  Patient feels like her symptoms are not improving, instead getting worse. Reports poor oral intake because of nausea. She is eating yogurt and cream of wheat. Could not tolerate meat or big meals. Reports one episode of NBNB emesis yesterday. Denies diarrhea. Also reports intermittent constipation at baseline. She uses Metamucil as needed if needed to patient. Denies fever, chills, chest pain, shortness of breath, palpitations. Review of Systems:    Review of Systems   Constitutional: Negative for activity change, appetite change, chills, fever and unexpected weight change. HENT: Negative for trouble swallowing and voice change. Eyes: Negative for pain and visual disturbance. Respiratory: Negative for cough, chest tightness, shortness of breath and wheezing. Cardiovascular: Negative for chest pain and palpitations. Gastrointestinal: Positive for abdominal pain, nausea and vomiting. Negative for abdominal distention and diarrhea. Genitourinary: Negative for difficulty urinating. Musculoskeletal: Negative for gait problem. Skin: Negative for rash. Neurological: Negative for light-headedness. Psychiatric/Behavioral: The patient is not nervous/anxious. All other systems reviewed and are negative.       Past Medical and Surgical History:     Past Medical History:   Diagnosis Date   • Diverticulitis    • Hypertension    • TIA (transient ischemic attack)        Past Surgical History:   Procedure Laterality Date   • COLECTOMY         Meds/Allergies:    Prior to Admission medications    Medication Sig Start Date End Date Taking? Authorizing Provider   aspirin (ECOTRIN LOW STRENGTH) 81 mg EC tablet Take 81 mg by mouth daily   Yes Historical Provider, MD   hydrochlorothiazide (HYDRODIURIL) 25 mg tablet Take 25 mg by mouth daily   Yes Historical Provider, MD   levothyroxine 88 mcg tablet Take 88 mcg by mouth daily 3/17/23  Yes Historical Provider, MD   metoprolol tartrate (LOPRESSOR) 25 mg tablet Take 12.5 mg by mouth in the morning   Yes Historical Provider, MD   simvastatin (ZOCOR) 40 mg tablet Take 40 mg by mouth daily at bedtime   Yes Historical Provider, MD   acetaminophen (TYLENOL) 500 mg tablet Take 500 mg by mouth every 4 (four) hours as needed for mild pain, headaches or fever    Historical Provider, MD   ALPRAZolam Ora Blanton) 0.25 mg tablet Take 0.125-0.25 mg by mouth 2 (two) times a day as needed 7/15/23 1/11/24  Historical Provider, MD   enalapril (VASOTEC) 10 mg tablet Take 1 tablet by mouth daily 3/20/23   Historical Provider, MD   ergocalciferol (ERGOCALCIFEROL) 1.25 MG (47186 UT) capsule Take 50,000 Units by mouth    Historical Provider, MD   ondansetron (ZOFRAN) 4 mg tablet Take 1 tablet (4 mg total) by mouth every 8 (eight) hours as needed for nausea or vomiting 8/5/23   Jose Ledezma DO   colchicine (COLCRYS) 0.6 mg tablet Take 0.6 mg by mouth 2 (two) times a day 7/26/23 8/13/23  Historical Provider, MD   Ibuprofen 200 MG CAPS Take 200 mg by mouth every 6 (six) hours as needed  8/13/23  Historical Provider, MD     I have reviewed home medications with patient personally. Allergies:    Allergies   Allergen Reactions   • Iodine - Food Allergy Hives       Social History:     Marital Status: Single   Occupation: Not working  Patient Pre-hospital Living Situation: Home  Patient Pre-hospital Level of Mobility: Ambulating independently using cane  Patient Pre-hospital Diet Restrictions: None  Substance Use History:   Social History     Substance and Sexual Activity   Alcohol Use Never     Social History     Tobacco Use   Smoking Status Every Day   • Packs/day: 0.25   • Types: Cigarettes   Smokeless Tobacco Never     Social History     Substance and Sexual Activity   Drug Use Never       Family History:    non-contributory    Physical Exam:     Vitals:   Blood Pressure: 108/56 (08/13/23 1739)  Pulse: (!) 54 (08/13/23 1739)  Temperature: 97.7 °F (36.5 °C) (08/13/23 1739)  Temp Source: Temporal (08/13/23 1630)  Respirations: 19 (08/13/23 1739)  Height: 5' 7" (170.2 cm) (08/13/23 1510)  Weight - Scale: 72.6 kg (160 lb) (08/13/23 1510)  SpO2: 95 % (08/13/23 1739)    Physical Exam  Vitals and nursing note reviewed. Constitutional:       General: She is not in acute distress. Appearance: She is well-developed. HENT:      Head: Normocephalic and atraumatic. Right Ear: External ear normal.      Left Ear: External ear normal.      Nose: Nose normal.      Mouth/Throat:      Mouth: Mucous membranes are moist.      Pharynx: Oropharynx is clear. Eyes:      General: No scleral icterus. Extraocular Movements: Extraocular movements intact. Conjunctiva/sclera: Conjunctivae normal.      Pupils: Pupils are equal, round, and reactive to light. Cardiovascular:      Rate and Rhythm: Regular rhythm. Bradycardia present. Pulses: Normal pulses. Heart sounds: Normal heart sounds. No murmur heard. Pulmonary:      Effort: Pulmonary effort is normal. No respiratory distress. Breath sounds: Normal breath sounds. Abdominal:      General: Bowel sounds are normal. There is no distension. Palpations: Abdomen is soft. Tenderness: There is abdominal tenderness in the left lower quadrant. There is no guarding or rebound. Musculoskeletal:         General: No swelling or tenderness. Cervical back: Neck supple. Right lower leg: No edema.       Left lower leg: No edema. Skin:     General: Skin is warm and dry. Capillary Refill: Capillary refill takes less than 2 seconds. Neurological:      General: No focal deficit present. Mental Status: She is alert and oriented to person, place, and time. Mental status is at baseline. Psychiatric:         Behavior: Behavior normal.             Additional Data:     Lab Results: I have personally reviewed pertinent reports. Results from last 7 days   Lab Units 08/13/23  1540   WBC Thousand/uL 9.89   HEMOGLOBIN g/dL 14.1   HEMATOCRIT % 42.3   PLATELETS Thousands/uL 329   NEUTROS PCT % 86*   LYMPHS PCT % 8*   MONOS PCT % 5   EOS PCT % 1     Results from last 7 days   Lab Units 08/13/23  1540   SODIUM mmol/L 136   POTASSIUM mmol/L 3.2*   CHLORIDE mmol/L 97   CO2 mmol/L 29   BUN mg/dL 29*   CREATININE mg/dL 1.77*   ANION GAP mmol/L 10   CALCIUM mg/dL 9.6   ALBUMIN g/dL 3.8   TOTAL BILIRUBIN mg/dL 0.68   ALK PHOS U/L 65   ALT U/L 6*   AST U/L 9*   GLUCOSE RANDOM mg/dL 135     Results from last 7 days   Lab Units 08/13/23  1540   INR  1.06             Results from last 7 days   Lab Units 08/13/23  1540   LACTIC ACID mmol/L 1.0   PROCALCITONIN ng/ml 0.13       Imaging: I have personally reviewed pertinent reports. CT abdomen pelvis wo contrast   Final Result by Wilman Smith MD (08/13 1707)      There is colonic diverticulosis. Interval decrease in the amount of fluid and fat stranding in the left lower quadrant. .      There is interval development of inflammatory changes surrounding a jejunal diverticulum in the left lower quadrant which is filled with debris and demonstrates a thick wall concerning for jejunal diverticulitis, 601:59. . A second enlarged jejunal    diverticula which does not demonstrate inflammatory changes is seen on 601:57. The study was marked in [facility] for immediate notification.                Workstation performed: [id number]             EKG, Pathology, and Other Studies Reviewed on Admission:   · EKG:   ECG 12 Lead Documentation Only     Date/Time: 8/13/2023 4:45 PM     Performed by: Emma Heaton PA-C   Authorized by: Emma Heaton PA-C     Indications / Diagnosis:  Chest pain   ECG reviewed by me, the ED Provider: yes     Patient location:  ED   Previous ECG:     Previous ECG:  Compared to current     Comparison to cardiac monitor: Yes     Interpretation:     Interpretation: abnormal     Rate:     ECG rate:  67     ECG rate assessment: normal     Rhythm:     Rhythm: sinus rhythm     Ectopy:     Ectopy: none     QRS:     QRS axis:  Normal     QRS intervals:  Normal   Conduction:     Conduction: normal     ST segments:     ST segments:  Normal   T waves:     T waves: non-specific     Comments:      , QRS 86, QT/QTc 436/460; low voltage QRS       Last Resulted: 08/13/23 16:45        ·     Allscripts / Epic Records Reviewed: Yes     ** Please Note: This note has been constructed using a voice recognition system.  **

## 2023-08-13 NOTE — ASSESSMENT & PLAN NOTE
Sinus bradycardia  Present at the time of admission.   Hold metoprolol  Telemetry monitoring  Reassess tomorrow  Monitor vitals

## 2023-08-13 NOTE — ASSESSMENT & PLAN NOTE
Lab Results   Component Value Date    HGBA1C 6.0 (H) 07/26/2023       Recent Labs     08/14/23  0726   POCGLU 92       Blood Sugar Average: Last 72 hrs:  Previously on Metformin 750 mg daily, discontinued by her PCP during last visit on 08/01/2023.   ISS with Accu check AC/HS  Hypoglycemia protocol

## 2023-08-13 NOTE — ASSESSMENT & PLAN NOTE
PTA medication: Enlapril 10 mg, metoprolol 12.5 mg, Hydrochlorothiazide 25 mg daily.    Blood pressure in low normal range with bradycardia on admission    BP stable, intermittent bradycardia overnight  Continue to hold antihypertensive medications  Follow blood pressure trend

## 2023-08-13 NOTE — ASSESSMENT & PLAN NOTE
PTA medication: Enlapril 10 mg, metoprolol 12.5 mg, Hydrochlorothiazide 25 mg daily.    Blood pressure in low normal range with bradycardia on admission  Will hold antihypertensive medications  Follow blood pressure trend

## 2023-08-13 NOTE — ASSESSMENT & PLAN NOTE
Recent Labs     08/13/23  1540   CREATININE 1.77*   EGFR 26     Estimated Creatinine Clearance: 23.4 mL/min (A) (by C-G formula based on SCr of 1.77 mg/dL (H)). Baseline around 0.9.    KAYLA secondary to dehydration with poor PO intake  Appear dehydrated on exam  IVF for hydration  Follow BMP

## 2023-08-13 NOTE — ASSESSMENT & PLAN NOTE
Intermittent constipation  Uses Metamucil as needed  Not on stool softeners at home  She might get benefited from stool softeners on discharge.

## 2023-08-13 NOTE — INCIDENTAL FINDINGS
The following findings require follow up:  Radiographic finding   Finding:   CT abdomen& pelvis 08/13/2023:  KIDNEYS/URETERS: One or more simple renal cyst(s) is noted.  Otherwise unremarkable kidneys.  No hydronephrosis. 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 No suspicious renal masses on either side. No perinephric collections. No urolithiasis. No hydronephrosis.        Follow up required: yes   Follow up should be done within 3 month(s)    Please notify the following clinician to assist with the follow up: PCP

## 2023-08-13 NOTE — INCIDENTAL FINDINGS
The following findings require follow up:  Radiographic finding   Finding:   CT abdomen and pelvis:  08/13/23: ADRENAL GLANDS: Left adrenal adenoma is unchanged    08/05/23: ADRENAL GLANDS: Right adrenal gland is normal. Lobulated very low density left adrenal lesion measuring 6 cm and occupying the majority of the superior and lateral limb--not significantly changed from 2022     Follow up required: yes   Follow up should be done within 6 months   Please notify the following clinician to assist with the follow up: PCP, Delma Thacker PA-C

## 2023-08-13 NOTE — ASSESSMENT & PLAN NOTE
Intermittent constipation  Uses Metamucil as needed  Not on stool softeners at home  Recommend stool softeners on discharge.

## 2023-08-13 NOTE — ASSESSMENT & PLAN NOTE
Background: h/o recurrent episodes of Diverticulitis. She was in the ED on 08/05/2023. Discharged home on Ciprofloxacin and Metronidazole. Patient did not take Metronidazole initially. During PCP follow up visit on 08/11/23, antibiotics changed to Augmentin and also advised to take metronidazole. Poor PO intake because of nausea. Presented to the ED with worsening abdominal pian and nausea. CT abdomen 08/13/23:   - There is colonic diverticulosis. Interval decrease in the amount of fluid and fat stranding in the left lower quadrant  - There is interval development of inflammatory changes surrounding a jejunal diverticulum in the left lower quadrant which is filled with debris and demonstrates a thick wall concerning for jejunal diverticulitis. A second enlarged jejunal diverticula which does not demonstrate inflammatory changes is seen on.     Continue ceftriaxone IV 2 gm q 24 hr and Metronidazole  mg q8h  IV Zofran for nausea  IVF for hydration  Diet advanced to clears this AM  General surgery consulted  I&O's  Monitor vitals  Follow Blood Cultures x2

## 2023-08-13 NOTE — ASSESSMENT & PLAN NOTE
Lab Results   Component Value Date    HGBA1C 6.0 (H) 07/26/2023       No results for input(s): "POCGLU" in the last 72 hours.     Blood Sugar Average: Last 72 hrs:  No home medication  ISS with Accu check AC/HS  Hypoglycemia protocol

## 2023-08-14 PROBLEM — R16.0 LIVER MASS: Status: ACTIVE | Noted: 2023-08-14

## 2023-08-14 PROBLEM — Q61.02 MULTIPLE RENAL CYSTS: Status: ACTIVE | Noted: 2023-08-14

## 2023-08-14 PROBLEM — D35.02 ADRENAL ADENOMA, LEFT: Status: ACTIVE | Noted: 2023-08-14

## 2023-08-14 LAB
25(OH)D3 SERPL-MCNC: 9.5 NG/ML (ref 30–100)
ALBUMIN SERPL BCP-MCNC: 3.1 G/DL (ref 3.5–5)
ALP SERPL-CCNC: 49 U/L (ref 34–104)
ALT SERPL W P-5'-P-CCNC: 5 U/L (ref 7–52)
ANION GAP SERPL CALCULATED.3IONS-SCNC: 8 MMOL/L
AST SERPL W P-5'-P-CCNC: 9 U/L (ref 13–39)
BASOPHILS # BLD AUTO: 0.03 THOUSANDS/ÂΜL (ref 0–0.1)
BASOPHILS NFR BLD AUTO: 0 % (ref 0–1)
BILIRUB SERPL-MCNC: 0.41 MG/DL (ref 0.2–1)
BUN SERPL-MCNC: 23 MG/DL (ref 5–25)
CALCIUM ALBUM COR SERPL-MCNC: 9.4 MG/DL (ref 8.3–10.1)
CALCIUM SERPL-MCNC: 8.7 MG/DL (ref 8.4–10.2)
CHLORIDE SERPL-SCNC: 103 MMOL/L (ref 96–108)
CHOLEST SERPL-MCNC: 140 MG/DL
CO2 SERPL-SCNC: 30 MMOL/L (ref 21–32)
CREAT SERPL-MCNC: 1.52 MG/DL (ref 0.6–1.3)
EOSINOPHIL # BLD AUTO: 0.16 THOUSAND/ÂΜL (ref 0–0.61)
EOSINOPHIL NFR BLD AUTO: 2 % (ref 0–6)
ERYTHROCYTE [DISTWIDTH] IN BLOOD BY AUTOMATED COUNT: 13.6 % (ref 11.6–15.1)
GFR SERPL CREATININE-BSD FRML MDRD: 31 ML/MIN/1.73SQ M
GLUCOSE SERPL-MCNC: 108 MG/DL (ref 65–140)
GLUCOSE SERPL-MCNC: 129 MG/DL (ref 65–140)
GLUCOSE SERPL-MCNC: 87 MG/DL (ref 65–140)
GLUCOSE SERPL-MCNC: 89 MG/DL (ref 65–140)
GLUCOSE SERPL-MCNC: 92 MG/DL (ref 65–140)
HCT VFR BLD AUTO: 37.5 % (ref 34.8–46.1)
HDLC SERPL-MCNC: 42 MG/DL
HGB BLD-MCNC: 11.9 G/DL (ref 11.5–15.4)
IMM GRANULOCYTES # BLD AUTO: 0.03 THOUSAND/UL (ref 0–0.2)
IMM GRANULOCYTES NFR BLD AUTO: 0 % (ref 0–2)
LDLC SERPL CALC-MCNC: 75 MG/DL (ref 0–100)
LYMPHOCYTES # BLD AUTO: 0.99 THOUSANDS/ÂΜL (ref 0.6–4.47)
LYMPHOCYTES NFR BLD AUTO: 13 % (ref 14–44)
MAGNESIUM SERPL-MCNC: 1.8 MG/DL (ref 1.9–2.7)
MCH RBC QN AUTO: 28.4 PG (ref 26.8–34.3)
MCHC RBC AUTO-ENTMCNC: 31.7 G/DL (ref 31.4–37.4)
MCV RBC AUTO: 90 FL (ref 82–98)
MONOCYTES # BLD AUTO: 0.52 THOUSAND/ÂΜL (ref 0.17–1.22)
MONOCYTES NFR BLD AUTO: 7 % (ref 4–12)
NEUTROPHILS # BLD AUTO: 5.81 THOUSANDS/ÂΜL (ref 1.85–7.62)
NEUTS SEG NFR BLD AUTO: 78 % (ref 43–75)
NRBC BLD AUTO-RTO: 0 /100 WBCS
PHOSPHATE SERPL-MCNC: 3 MG/DL (ref 2.3–4.1)
PLATELET # BLD AUTO: 270 THOUSANDS/UL (ref 149–390)
PMV BLD AUTO: 10.5 FL (ref 8.9–12.7)
POTASSIUM SERPL-SCNC: 3.6 MMOL/L (ref 3.5–5.3)
PROCALCITONIN SERPL-MCNC: 0.11 NG/ML
PROT SERPL-MCNC: 5.7 G/DL (ref 6.4–8.4)
RBC # BLD AUTO: 4.19 MILLION/UL (ref 3.81–5.12)
SODIUM SERPL-SCNC: 141 MMOL/L (ref 135–147)
TRIGL SERPL-MCNC: 113 MG/DL
TSH SERPL DL<=0.05 MIU/L-ACNC: 2.02 UIU/ML (ref 0.45–4.5)
WBC # BLD AUTO: 7.54 THOUSAND/UL (ref 4.31–10.16)

## 2023-08-14 PROCEDURE — 80053 COMPREHEN METABOLIC PANEL: CPT

## 2023-08-14 PROCEDURE — 85025 COMPLETE CBC W/AUTO DIFF WBC: CPT

## 2023-08-14 PROCEDURE — 97162 PT EVAL MOD COMPLEX 30 MIN: CPT

## 2023-08-14 PROCEDURE — 80061 LIPID PANEL: CPT

## 2023-08-14 PROCEDURE — 84145 PROCALCITONIN (PCT): CPT | Performed by: INTERNAL MEDICINE

## 2023-08-14 PROCEDURE — 82306 VITAMIN D 25 HYDROXY: CPT | Performed by: INTERNAL MEDICINE

## 2023-08-14 PROCEDURE — 84100 ASSAY OF PHOSPHORUS: CPT

## 2023-08-14 PROCEDURE — 99232 SBSQ HOSP IP/OBS MODERATE 35: CPT | Performed by: INTERNAL MEDICINE

## 2023-08-14 PROCEDURE — 83735 ASSAY OF MAGNESIUM: CPT

## 2023-08-14 PROCEDURE — 82948 REAGENT STRIP/BLOOD GLUCOSE: CPT

## 2023-08-14 PROCEDURE — 84443 ASSAY THYROID STIM HORMONE: CPT

## 2023-08-14 PROCEDURE — 97166 OT EVAL MOD COMPLEX 45 MIN: CPT

## 2023-08-14 PROCEDURE — 99223 1ST HOSP IP/OBS HIGH 75: CPT

## 2023-08-14 RX ORDER — MAGNESIUM SULFATE 1 G/100ML
1 INJECTION INTRAVENOUS ONCE
Status: COMPLETED | OUTPATIENT
Start: 2023-08-14 | End: 2023-08-14

## 2023-08-14 RX ORDER — DOCUSATE SODIUM 100 MG/1
100 CAPSULE, LIQUID FILLED ORAL 2 TIMES DAILY
Status: DISCONTINUED | OUTPATIENT
Start: 2023-08-14 | End: 2023-08-16 | Stop reason: HOSPADM

## 2023-08-14 RX ORDER — LANOLIN ALCOHOL/MO/W.PET/CERES
400 CREAM (GRAM) TOPICAL 2 TIMES DAILY
Status: DISCONTINUED | OUTPATIENT
Start: 2023-08-14 | End: 2023-08-14

## 2023-08-14 RX ORDER — SODIUM CHLORIDE 9 MG/ML
100 INJECTION, SOLUTION INTRAVENOUS CONTINUOUS
Status: DISCONTINUED | OUTPATIENT
Start: 2023-08-14 | End: 2023-08-16 | Stop reason: HOSPADM

## 2023-08-14 RX ORDER — OXYCODONE HYDROCHLORIDE 5 MG/1
5 TABLET ORAL EVERY 6 HOURS PRN
Status: DISCONTINUED | OUTPATIENT
Start: 2023-08-14 | End: 2023-08-16 | Stop reason: HOSPADM

## 2023-08-14 RX ORDER — PRAVASTATIN SODIUM 40 MG
80 TABLET ORAL
Status: DISCONTINUED | OUTPATIENT
Start: 2023-08-14 | End: 2023-08-16 | Stop reason: HOSPADM

## 2023-08-14 RX ADMIN — ONDANSETRON 4 MG: 2 INJECTION INTRAMUSCULAR; INTRAVENOUS at 11:37

## 2023-08-14 RX ADMIN — ONDANSETRON 4 MG: 2 INJECTION INTRAMUSCULAR; INTRAVENOUS at 17:41

## 2023-08-14 RX ADMIN — METRONIDAZOLE 500 MG: 500 INJECTION, SOLUTION INTRAVENOUS at 18:39

## 2023-08-14 RX ADMIN — METRONIDAZOLE 500 MG: 500 INJECTION, SOLUTION INTRAVENOUS at 03:00

## 2023-08-14 RX ADMIN — SODIUM CHLORIDE 100 ML/HR: 0.9 INJECTION, SOLUTION INTRAVENOUS at 21:41

## 2023-08-14 RX ADMIN — DOCUSATE SODIUM 100 MG: 100 CAPSULE, LIQUID FILLED ORAL at 17:38

## 2023-08-14 RX ADMIN — DOCUSATE SODIUM 100 MG: 100 CAPSULE, LIQUID FILLED ORAL at 10:29

## 2023-08-14 RX ADMIN — HEPARIN SODIUM 5000 UNITS: 5000 INJECTION INTRAVENOUS; SUBCUTANEOUS at 14:22

## 2023-08-14 RX ADMIN — CEFTRIAXONE 2000 MG: 2 INJECTION, SOLUTION INTRAVENOUS at 17:38

## 2023-08-14 RX ADMIN — SODIUM CHLORIDE 125 ML/HR: 0.9 INJECTION, SOLUTION INTRAVENOUS at 10:18

## 2023-08-14 RX ADMIN — ASPIRIN 81 MG: 81 TABLET, COATED ORAL at 10:14

## 2023-08-14 RX ADMIN — METRONIDAZOLE 500 MG: 500 INJECTION, SOLUTION INTRAVENOUS at 10:14

## 2023-08-14 RX ADMIN — MAGNESIUM SULFATE HEPTAHYDRATE 1 G: 1 INJECTION, SOLUTION INTRAVENOUS at 14:22

## 2023-08-14 RX ADMIN — PRAVASTATIN SODIUM 80 MG: 40 TABLET ORAL at 17:38

## 2023-08-14 RX ADMIN — HEPARIN SODIUM 5000 UNITS: 5000 INJECTION INTRAVENOUS; SUBCUTANEOUS at 05:07

## 2023-08-14 RX ADMIN — HEPARIN SODIUM 5000 UNITS: 5000 INJECTION INTRAVENOUS; SUBCUTANEOUS at 21:35

## 2023-08-14 RX ADMIN — LEVOTHYROXINE SODIUM 88 MCG: 88 TABLET ORAL at 05:07

## 2023-08-14 RX ADMIN — Medication 400 MG: at 10:14

## 2023-08-14 NOTE — PLAN OF CARE
Problem: PAIN - ADULT  Goal: Verbalizes/displays adequate comfort level or baseline comfort level  Description: Interventions:  - Encourage patient to monitor pain and request assistance  - Assess pain using appropriate pain scale  - Administer analgesics based on type and severity of pain and evaluate response  - Implement non-pharmacological measures as appropriate and evaluate response  - Consider cultural and social influences on pain and pain management  - Notify physician/advanced practitioner if interventions unsuccessful or patient reports new pain  Outcome: Progressing     Problem: INFECTION - ADULT  Goal: Absence or prevention of progression during hospitalization  Description: INTERVENTIONS:  - Assess and monitor for signs and symptoms of infection  - Monitor lab/diagnostic results  - Monitor all insertion sites, i.e. indwelling lines, tubes, and drains  - Monitor endotracheal if appropriate and nasal secretions for changes in amount and color  - Homeland appropriate cooling/warming therapies per order  - Administer medications as ordered  - Instruct and encourage patient and family to use good hand hygiene technique  - Identify and instruct in appropriate isolation precautions for identified infection/condition  Outcome: Progressing  Goal: Absence of fever/infection during neutropenic period  Description: INTERVENTIONS:  - Monitor WBC    Outcome: Progressing     Problem: SAFETY ADULT  Goal: Patient will remain free of falls  Description: INTERVENTIONS:  - Educate patient/family on patient safety including physical limitations  - Instruct patient to call for assistance with activity   - Consult OT/PT to assist with strengthening/mobility   - Keep Call bell within reach  - Keep bed low and locked with side rails adjusted as appropriate  - Keep care items and personal belongings within reach  - Initiate and maintain comfort rounds  - Make Fall Risk Sign visible to staff  - Offer Toileting every 2 Hours, in advance of need  - Initiate/Maintain  bed alarm  - Obtain necessary fall risk management equipment: non skid socks  - Apply yellow socks and bracelet for high fall risk patients  - Consider moving patient to room near nurses station  Outcome: Progressing  Goal: Maintain or return to baseline ADL function  Description: INTERVENTIONS:  -  Assess patient's ability to carry out ADLs; assess patient's baseline for ADL function and identify physical deficits which impact ability to perform ADLs (bathing, care of mouth/teeth, toileting, grooming, dressing, etc.)  - Assess/evaluate cause of self-care deficits   - Assess range of motion  - Assess patient's mobility; develop plan if impaired  - Assess patient's need for assistive devices and provide as appropriate  - Encourage maximum independence but intervene and supervise when necessary  - Involve family in performance of ADLs  - Assess for home care needs following discharge   - Consider OT consult to assist with ADL evaluation and planning for discharge  - Provide patient education as appropriate  Outcome: Progressing  Goal: Maintains/Returns to pre admission functional level  Description: INTERVENTIONS:  - Perform BMAT or MOVE assessment daily.   - Set and communicate daily mobility goal to care team and patient/family/caregiver. - Collaborate with rehabilitation services on mobility goals if consulted  - Perform Range of Motion 2 times a day. - Reposition patient every 2 hours.   - Dangle patient 2 times a day  - Stand patient 2 times a day  - Ambulate patient 2 times a day  - Out of bed to chair 2 times a day   - Out of bed for meals 2 times a day  - Out of bed for toileting  - Record patient progress and toleration of activity level   Outcome: Progressing     Problem: DISCHARGE PLANNING  Goal: Discharge to home or other facility with appropriate resources  Description: INTERVENTIONS:  - Identify barriers to discharge w/patient and caregiver  - Arrange for needed discharge resources and transportation as appropriate  - Identify discharge learning needs (meds, wound care, etc.)  - Arrange for interpretive services to assist at discharge as needed  - Refer to Case Management Department for coordinating discharge planning if the patient needs post-hospital services based on physician/advanced practitioner order or complex needs related to functional status, cognitive ability, or social support system  Outcome: Progressing     Problem: Knowledge Deficit  Goal: Patient/family/caregiver demonstrates understanding of disease process, treatment plan, medications, and discharge instructions  Description: Complete learning assessment and assess knowledge base.   Interventions:  - Provide teaching at level of understanding  - Provide teaching via preferred learning methods  Outcome: Progressing

## 2023-08-14 NOTE — CONSULTS
Consultation - General Surgery   Lori Bennett 79 y.o. female MRN: [id number]  Unit/Bed#: 403-41 Encounter: [id number]    Assessment/Plan     Assessment:  23-year-old female with history of type 2 diabetes mellitus, essential hypertension, hypothyroidism with known recurrent diverticulitis dating back over 20+ years presented to the emergency room yesterday with generalized abdominal pain, nausea and vomiting. She had been seen here in the ED 1 week ago on August 5th with similar symptoms where CT scan of the abdomen pelvis demonstrated focal sigmoid diverticulitis without abscess or free air. Also partially calcified mass of the right liver dome and nonaggressive left adrenal lesion noted. The patient was discharged on oral ciprofloxacin and metronidazole, she reported that she initially did not take the Flagyl until 3 days ago last Friday because of nausea it made her feel "sick "in the past.    Seen in the office on August 11th by PCP and was changed from ciprofloxacin and Flagyl to Augmentin and Flagyl, she was noncompliant with starting the Flagyl until 3 days ago because it made her nauseated. Patient again seen through the ED here yesterday with similar symptoms, pain mostly in the left lower abdomen. She is passing flatus. Last bowel movement was 2 days ago. Denies any fever or chills. Repeat CT scan abdomen pelvis without IV contrast because left lower quadrant abdominal pain yesterday in the ED showed colonic diverticulosis with interval decrease in the amount of fluid and fat stranding the left lower quadrant. There is new interval development of inflammatory changes surrounding a jejunal diverticulum in the left lower quadrant of the abdomen which is filled with debris and demonstrates a thick wall concerning for jejunal diverticulitis. A second enlarged jejunal diverticula did not demonstrate any inflammatory changes.     Right now on exam the patient with minimal tenderness in the left lower quadrant the abdomen. No guarding or rebound. Patient denies any pain. No nausea. Passing flatus. Overall she feels much better. Her last colonoscopy was performed at [facility] by Dr. Ralph Espinosa 2 years ago. Her last hospital admission for acute diverticulitis was about 7 years ago she believes. She has never had any surgery for diverticulitis, she said that she was recommended over 20 years ago for colon resection though she did not want to pursue any surgery at that time. CBC this morning normal 7.54 (9.89). Was 11.39 on 8/5/2023 in the ED. Hemoglobin 11.9, was 14.1 in the ED. No sign of any GI bleed. May be dilutional.  Procalcitonin level normal 0.11  Acute kidney injury present on admission, creatinine today 1.52 (1.77), believed likely secondary to decreased oral intake and mild dehydration. Magnesium was repleted and now resolved    Vital signs reviewed, afebrile. Bradycardic in the ED rate 54, this morning 53. SPO2 on room air 93%. Plan:  No indication for surgical intervention at this time  The patient needs continued inpatient hospitalization for IV antibiotics because of failed outpatient improvement with oral antibiotic therapy and patient intolerance to p.o. metronidazole.   Repeat a.m. labs  Out of bed as tolerated  Her clinical exam is much improved, the patient reports "no pain "  Feels like she would like to try eating something, will slowly advance clear liquids as tolerated at this time  Continue IV ceftriaxone 2 g every 24 hours and metronidazole 500 mg IV every 8 hours  Analgesics and antiemetics as ordered as needed  Monitor DAILY's and vitals  Follow-up blood cultures x2 obtained prior to initiation of IV antibiotic therapy  Serial abdominal exams  Medicine ordered an AFP tumor marker with CT finding of calcified mass of right lobe of liver  Dr. Bridger Linn is the on-call general surgeon, she will evaluate the patient and this provider will personally update regarding clinical management at this time. History of Present Illness     HPI:  Tara Dupree is a 79 y.o. female who presents with generalized abdominal pain associated with nausea and vomiting. The patient reports history of chronic recurrent diverticulitis dating back over 20 years. She was last hospitalized around 7 years ago. Patient presented with pain mostly now localized left lower quadrant of the abdomen which started over a week ago and she was seen in the ED here last weekend with similar complaints, she was discharged on oral ciprofloxacin and metronidazole. She said that she was not taking the metronidazole to last Friday as it made her nauseated and sick in the past so she avoided it. She was seen by the PCP on August 11 and she was switched to Augmentin and metronidazole with little improvement, now worsening left lower quadrant abdominal pain. She is not feeling she is getting any better. Decreased oral intake with nausea. She is mostly eating yogurt and cream of wheat, she does not tolerate any meats or larger meals. Nausea and 1 episode of nonbilious nonbloody vomiting yesterday, denies any diarrhea. Last bowel movement was 2 days ago, she does report intermittent constipation for which she uses laxative with Metamucil at home as needed.       CT abdomen 08/13/23:   - There is colonic diverticulosis. Interval decrease in the amount of fluid and fat stranding in the left lower quadrant  - There is interval development of inflammatory changes surrounding a jejunal diverticulum in the left lower quadrant which is filled with debris and demonstrates a thick wall concerning for jejunal diverticulitis.  A second enlarged jejunal diverticula which does not demonstrate inflammatory changes is seen on.     Inpatient consult to Acute Care Surgery  Consult performed by: Lashon Merino PA-C  Consult ordered by: Elmer Desir DO        Review of Systems   Constitutional: Positive for activity change and appetite change. Negative for chills, diaphoresis, fatigue, fever and unexpected weight change. HENT: Negative. Eyes: Negative. Respiratory: Negative. Negative for shortness of breath. Cardiovascular: Negative for chest pain, palpitations and leg swelling. Gastrointestinal: Positive for abdominal pain, constipation, nausea and vomiting. Negative for abdominal distention, anal bleeding, blood in stool and rectal pain. Endocrine: Negative. Genitourinary: Negative. Negative for decreased urine volume, difficulty urinating, flank pain, frequency, hematuria, urgency and vaginal bleeding. G2, P2, AB0   Musculoskeletal: Negative for arthralgias and gait problem. Skin: Negative. Allergic/Immunologic: Negative. Neurological: Negative. Hematological: Negative. Psychiatric/Behavioral: Negative. Historical Information   Past Medical History:   Diagnosis Date   • Diverticulitis    • Hypertension    • TIA (transient ischemic attack)      Past Surgical History:   Procedure Laterality Date   • COLECTOMY       Social History   Social History     Substance and Sexual Activity   Alcohol Use Never     Social History     Substance and Sexual Activity   Drug Use Never     E-Cigarette/Vaping   • E-Cigarette Use Never User      E-Cigarette/Vaping Substances     Social History     Tobacco Use   Smoking Status Every Day   • Packs/day: 0.25   • Types: Cigarettes   Smokeless Tobacco Never     Family History: History reviewed. No pertinent family history.     Meds/Allergies   current meds:   Current Facility-Administered Medications   Medication Dose Route Frequency   • acetaminophen (TYLENOL) tablet 650 mg  650 mg Oral Q6H PRN   • ALPRAZolam (XANAX) tablet 0.25 mg  0.25 mg Oral BID PRN   • aspirin (ECOTRIN LOW STRENGTH) EC tablet 81 mg  81 mg Oral Daily   • calcium carbonate (TUMS) chewable tablet 1,000 mg  1,000 mg Oral Daily PRN   • cefTRIAXone (ROCEPHIN) IVPB (premix in dextrose) 2,000 mg 50 mL  2,000 mg Intravenous Q24H   • docusate sodium (COLACE) capsule 100 mg  100 mg Oral BID PRN   • heparin (porcine) subcutaneous injection 5,000 Units  5,000 Units Subcutaneous Q8H [address]   • insulin lispro (HumaLOG) 100 units/mL subcutaneous injection 1-5 Units  1-5 Units Subcutaneous TID AC   • levothyroxine tablet 88 mcg  88 mcg Oral Early Morning   • magnesium Oxide (MAG-OX) tablet 400 mg  400 mg Oral BID   • metroNIDAZOLE (FLAGYL) IVPB (premix) 500 mg 100 mL  500 mg Intravenous Q8H   • nicotine (NICODERM CQ) 7 mg/24hr TD 24 hr patch 1 patch  1 patch Transdermal Daily   • ondansetron (ZOFRAN) injection 4 mg  4 mg Intravenous Q6H PRN   • pravastatin (PRAVACHOL) tablet 80 mg  80 mg Oral Daily With Dinner   • sodium chloride 0.9 % infusion  125 mL/hr Intravenous Continuous     Allergies   Allergen Reactions   • Iodine - Food Allergy Hives       Objective   First Vitals:   Blood Pressure: 130/69 (08/13/23 1513)  Pulse: 71 (08/13/23 1510)  Temperature: 98.2 °F (36.8 °C) (08/13/23 1510)  Temp Source: Temporal (08/13/23 1510)  Respirations: 16 (08/13/23 1510)  Height: 5' 7" (170.2 cm) (08/13/23 1510)  Weight - Scale: 72.6 kg (160 lb) (08/13/23 1510)  SpO2: 96 % (08/13/23 1510)    Current Vitals:   Blood Pressure: 105/57 (08/14/23 0727)  Pulse: (!) 53 (08/14/23 0727)  Temperature: 97.9 °F (36.6 °C) (08/14/23 0727)  Temp Source: Oral (08/13/23 2241)  Respirations: 18 (08/14/23 0727)  Height: 5' 7" (170.2 cm) (08/13/23 1510)  Weight - Scale: 72.6 kg (160 lb) (08/13/23 1510)  SpO2: 92 % (08/14/23 0727)      Intake/Output Summary (Last 24 hours) at 8/14/2023 0919  Last data filed at 8/14/2023 0817  Gross per 24 hour   Intake 50 ml   Output --   Net 50 ml       Invasive Devices     Peripheral Intravenous Line  Duration           Peripheral IV 08/13/23 Right Antecubital <1 day                Physical Exam  Constitutional:       General: She is not in acute distress. Appearance: Normal appearance.  She is not ill-appearing, toxic-appearing or diaphoretic. HENT:      Head: Normocephalic and atraumatic. Nose: Nose normal. No congestion. Mouth/Throat:      Mouth: Mucous membranes are moist.      Pharynx: Oropharynx is clear. No oropharyngeal exudate. Eyes:      General: No scleral icterus. Pupils: Pupils are equal, round, and reactive to light. Neck:      Vascular: No carotid bruit. Cardiovascular:      Rate and Rhythm: Bradycardia present. Pulses: Normal pulses. Heart sounds: No murmur heard. No gallop. Pulmonary:      Effort: Pulmonary effort is normal.      Breath sounds: Normal breath sounds. No wheezing, rhonchi or rales. Abdominal:      General: Bowel sounds are normal. There is no distension. Palpations: Abdomen is soft. There is no mass. Tenderness: There is abdominal tenderness. There is no right CVA tenderness, left CVA tenderness, guarding or rebound. Hernia: No hernia is present. Comments: Positive bowel sounds are heard in 4 quadrants, normal active in nature throughout. The abdomen is nondistended. Soft, minimal tenderness left lower quadrant without peritoneal signs. No masses are felt. No abdominal hernias are palpable. Musculoskeletal:         General: No tenderness, deformity or signs of injury. Normal range of motion. Cervical back: Normal range of motion and neck supple. No rigidity or tenderness. Right lower leg: No edema. Left lower leg: No edema. Lymphadenopathy:      Cervical: No cervical adenopathy. Skin:     General: Skin is warm. Capillary Refill: Capillary refill takes less than 2 seconds. Coloration: Skin is not jaundiced or pale. Findings: No erythema or rash. Neurological:      General: No focal deficit present. Mental Status: She is alert and oriented to person, place, and time. Mental status is at baseline. Cranial Nerves: No cranial nerve deficit. Motor: No weakness. Coordination: Coordination normal.   Psychiatric:         Mood and Affect: Mood normal.         Behavior: Behavior normal.         Thought Content: Thought content normal.       Lab Results:   I have personally reviewed pertinent lab results. , CBC:   Lab Results   Component Value Date    WBC 7.54 08/14/2023    HGB 11.9 08/14/2023    HCT 37.5 08/14/2023    MCV 90 08/14/2023     08/14/2023    RBC 4.19 08/14/2023    MCH 28.4 08/14/2023    MCHC 31.7 08/14/2023    RDW 13.6 08/14/2023    MPV 10.5 08/14/2023    NRBC 0 08/14/2023   , CMP:   Lab Results   Component Value Date    SODIUM 141 08/14/2023    K 3.6 08/14/2023     08/14/2023    CO2 30 08/14/2023    BUN 23 08/14/2023    CREATININE 1.52 (H) 08/14/2023    CALCIUM 8.7 08/14/2023    AST 9 (L) 08/14/2023    ALT 5 (L) 08/14/2023    ALKPHOS 49 08/14/2023    EGFR 31 08/14/2023   , Coagulation:   Lab Results   Component Value Date    INR 1.06 08/13/2023   , Urinalysis:   Lab Results   Component Value Date    COLORU Yellow 08/13/2023    CLARITYU Clear 08/13/2023    SPECGRAV 1.010 08/13/2023    PHUR 5.5 08/13/2023    LEUKOCYTESUR Negative 08/13/2023    NITRITE Negative 08/13/2023    GLUCOSEU Negative 08/13/2023    KETONESU Negative 08/13/2023    BILIRUBINUR Negative 08/13/2023    BLOODU Negative 08/13/2023   , Lipase:   Lab Results   Component Value Date    LIPASE 7 (L) 08/13/2023     Imaging: I have personally reviewed pertinent reports.        CT ABDOMEN AND PELVIS WITHOUT IV CONTRAST 8/13/2023     INDICATION:   LLQ abdominal pain  abd pain.     COMPARISON: 8/5/2023     TECHNIQUE:  CT examination of the abdomen and pelvis was performed without intravenous contrast. Multiplanar 2D reformatted images were created from the source data.     This examination, like all CT scans performed in the [facility], was performed utilizing techniques to minimize radiation dose exposure, including the use of iterative reconstruction and automated exposure control. Radiation dose length   product (DLP) for this visit:  577.67 mGy-cm     Enteric contrast was not administered.     FINDINGS:     ABDOMEN     LOWER CHEST: Bilateral basilar scarring. Otherwise unremarkable lung bases.     LIVER/BILIARY TREE: Previously seen 5 x 4 ovoid partially calcified mass at the hepatic dome appears similar to the prior exams from this institution and similar to description from prior outside report; images not available. .     GALLBLADDER: Gallbladder is surgically absent.     SPLEEN:  Unremarkable.     PANCREAS:  Unremarkable.     ADRENAL GLANDS: Left adrenal adenoma is unchanged. .     KIDNEYS/URETERS: One or more simple renal cyst(s) is noted. Otherwise unremarkable kidneys. No hydronephrosis.     STOMACH AND BOWEL: There is colonic diverticulosis. Interval decrease in the amount of fluid and fat stranding in the left lower quadrant. .     There is interval development of inflammatory changes surrounding a jejunal diverticulum in the left lower quadrant which is filled with debris and demonstrates a thick wall concerning for jejunal diverticulitis, 601:59. . A second enlarged jejunal   diverticula which does not demonstrate inflammatory changes is seen on 601:57.     APPENDIX: A normal appendix was visualized.     ABDOMINOPELVIC CAVITY:  No ascites. No pneumoperitoneum. No lymphadenopathy.     VESSELS:  Unremarkable for patient's age.     PELVIS     REPRODUCTIVE ORGANS: Calcified myomatous uterus. .     URINARY BLADDER:  Unremarkable.     ABDOMINAL WALL/INGUINAL REGIONS:  Unremarkable.     OSSEOUS STRUCTURES:  No acute fracture or destructive osseous lesion.     IMPRESSION:     There is colonic diverticulosis. Interval decrease in the amount of fluid and fat stranding in the left lower quadrant. .     There is interval development of inflammatory changes surrounding a jejunal diverticulum in the left lower quadrant which is filled with debris and demonstrates a thick wall concerning for jejunal diverticulitis, 601:59. . A second enlarged jejunal diverticula which does not demonstrate inflammatory changes is seen on 601:57. CT ABDOMEN AND PELVIS WITHOUT IV CONTRAST - 8/5/2023     INDICATION:   LLQ abdominal pain  LLQ pain.     COMPARISON: 10/31/2022. [facility]. Outside images are not available for comparison.     TECHNIQUE:  CT examination of the abdomen and pelvis was performed without intravenous contrast. Multiplanar 2D reformatted images were created from the source data.     This examination, like all CT scans performed in the [facility], was performed utilizing techniques to minimize radiation dose exposure, including the use of iterative reconstruction and automated exposure control. Radiation dose length   product (DLP) for this visit:  630.15 mGy-cm     Enteric contrast was administered.     FINDINGS:     ABDOMEN     LOWER CHEST:  No clinically significant abnormality identified in the visualized lower chest.     LIVER/BILIARY TREE: Oval 5 cm x 3.8 cm partially calcified mass at the right hepatic dome with surrounding hypodense rim appears unchanged (Note: Discrepancy in size from that reported on prior is due to inclusion of hypoechoic rim in previous   measurements). No new masses. No biliary ductal dilatation. Hepatic contour is smooth.     GALLBLADDER: Gallbladder is surgically absent.     SPLEEN:  Unremarkable.     PANCREAS: Atrophic. No masses or ductal dilatation.     ADRENAL GLANDS: Right adrenal gland is normal. Lobulated very low density left adrenal lesion measuring 6 cm and occupying the majority of the superior and lateral limb--not significantly changed from 2022 when accounting for differences in scanning   angle.     KIDNEYS/URETERS: Unchanged bilateral Bosniak II cysts in both kidneys. No suspicious renal masses on either side. No perinephric collections. No urolithiasis.  No hydronephrosis.     STOMACH AND BOWEL: Stomach is unremarkable. No dilated or inflamed loops of small bowel. Extensive colonic diverticulosis, greatest at nondistensible proximal sigmoid segment. Focus of pericolonic fat stranding and apparent wall thickening at this segment (2/124; 601/74).     APPENDIX: A normal appendix was visualized.     ABDOMINOPELVIC CAVITY: No encapsulated collections or free air at the sigmoid colon or remotely. No ascites. No mesenteric, retroperitoneal, or pelvic sidewall lymphadenopathy.     VESSELS:  Unremarkable for patient's age.     PELVIS     REPRODUCTIVE ORGANS:  Unremarkable for patient's age.     URINARY BLADDER:  Unremarkable.     ABDOMINAL WALL/INGUINAL REGIONS:  Unremarkable.     OSSEOUS STRUCTURES:  No acute fracture or destructive osseous lesion.     IMPRESSION:        1. Focal sigmoid diverticulitis without abscess or free air.     Given associated colonic wall thickening, consider colonoscopic follow-up after resolution of acute episode of illness--if not already recently performed--to exclude underlying mural infiltrative process.     2. Partially calcified mass at the right liver dome and nonaggressive left adrenal lesion. Review of reports from outside studies in [facility] shows that these findings have been present since at least 2007, obviating need for further imaging   work-up.     Note: The study was marked in EPIC for significant notification. Colonoscopic follow-up reminder notification was scheduled in the electronic medical record.        EKG, Pathology, and Other Studies: I have personally reviewed pertinent reports. Counseling / Coordination of Care  Total floor / unit time spent today 45 minutes. Greater than 50% of total time was spent with the patient and / or family counseling and / or coordination of care.   A description of the counseling / coordination of care: Review of diagnostic imaging with comparison of CT scans and updated interval findings, review of laboratory values, personal examination the patient, review existing orders and antibiotic management, discussion with the attending hospitalist physician all conducted in general surgical evaluation of the patient at this time.       Paty Downing PA-C

## 2023-08-14 NOTE — OCCUPATIONAL THERAPY NOTE
Occupational Therapy Evaluation     Patient Name: Terence Dc  OEAXW'F Date: 8/14/2023  Problem List  Principal Problem:    Diverticulitis  Active Problems:    Hypokalemia    Type 2 diabetes mellitus (HCC)    H/O TIA (transient ischemic attack) and stroke    KAYLA (acute kidney injury) ([address])    Hypothyroidism    Hypertension    Bradycardia    Constipation    Hypomagnesemia    Liver mass    Multiple renal cysts    Adrenal adenoma, left    Past Medical History  Past Medical History:   Diagnosis Date    Diverticulitis     Hypertension     TIA (transient ischemic attack)      Past Surgical History  Past Surgical History:   Procedure Laterality Date    COLECTOMY               08/14/23 0856   OT Last Visit   OT Visit Date 08/14/23   Note Type   Note type Evaluation   Pain Assessment   Pain Score No Pain   Restrictions/Precautions   Weight Bearing Precautions Per Order No   Other Precautions Fall Risk;Telemetry   Home Living   Type of [address] [place] [address] Two level;Performs ADLs on one level; Able to live on main level with bedroom/bathroom; Other (Comment); Stairs to enter without rails  (1 LILIAN)   Bathroom Shower/Tub Tub/shower unit   [employer] Raised   Bathroom Equipment Grab bars in shower; Shower chair;Grab bars around toilet   [address] Walker;Cane;Wheelchair-manual;Grab bars; Other (Comment)  (lift chair)   Additional Comments pt does not utilize device at baseline during functional mobility; pt's  recently passed away   Prior Function   Level of [place] Independent with ADLs; Independent with functional mobility; Independent with IADLS   Lives With Alone   IADLs Independent with driving   Falls in the last 6 months 0   Vocational Retired   Comments pt performs functional mobility with use of no device at baseline   Subjective   Subjective "can I get out of bed?"   ADL   Where Assessed Other (Comment)  (bathroom)   Grooming Assistance 7  Independent   LB Bathing Assistance 7  Independent   LB Dressing Assistance 7  [place] Assistance  7  Independent   Additional Comments pt performs toileting, LB dressing, garcía hygiene, teeth care, grooming, and LB bathing during session with no difficulties in bathroom and standing at sink for ~15 minutes   Bed Mobility   Supine to Sit 7  Independent   Sit to Supine   (pt seated in chair at end of session)   Additional Comments pt on RA during session, SpO2 WFL with no complaints of SOB   Transfers   Sit to Stand 7  Independent   Stand to Sit 7  Independent   Toilet transfer 7  Independent   Additional Comments pt performs functional transfers with (I) level and no device; no significant LOB or instability   Functional Mobility   Functional Mobility 7  Independent   Additional Comments pt performs functional mobility with no device in room; no significant LOB or instability   Additional items   (no device)   Balance   Static Sitting Good   Dynamic Sitting Good   Static Standing Good   Dynamic Standing Good   Ambulatory Good   Activity Tolerance   Activity Tolerance Patient tolerated treatment well   RUE Assessment   RUE Assessment WFL   LUE Assessment   LUE Assessment WFL   Hand Function   Gross Motor Coordination Functional   Fine Motor Coordination Functional   Sensation   Light Touch No apparent deficits   Sharp/Dull No apparent deficits   Psychosocial   Psychosocial (WDL) WDL   Cognition   Overall Cognitive Status WFL   Arousal/Participation Alert   Attention Within functional limits   Orientation Level Oriented X4   Memory Within functional limits   Following Commands Follows all commands and directions without difficulty   Assessment   Assessment Patient is a 79 y.o. female admitted to [facility] on 8/13/2023 due to Diverticulitis. Pt performed at (I) level with no OT needs identified at this time Comorbidities affecting pt's physical performance at time of assessment include diverticulitis, TIA, HTN. The patient's raw score on the AM-PAC Daily Activity Inpatient Short Form is 24. A raw score of greater than or equal to 19 suggests the patient may benefit from discharge to home. Please refer to the recommendation of the Occupational Therapist for safe discharge planning. From OT standpoint recommend anticipate no needs upon D/C. No further acute OT needs identified at this time. Recommend continued mobilization with hospital staff and restorative services while in the hospital to increase pt’s endurance and strength upon D/C. From OT standpoint, recommend D/C to home with family support when medically cleared. D/C pt from OT caseload at this time. Pt benefited from co-evaluation of skilled OT and PT therapists in order to most appropriately address functional deficits d/t extensive assistance required for safe functional mobility, decreased activity tolerance, and regression from functioning level prior to admission and/or onset of present illness. OT/PT objectives were addressed separately; please see PT note for specific goal areas targeted.    Goals   Patient Goals to go home   Recommendation   OT Discharge Recommendation No rehabilitation needs   AM-PAC Daily Activity Inpatient   Lower Body Dressing 4   Bathing 4   Toileting 4   Upper Body Dressing 4   Grooming 4   Eating 4   Daily Activity Raw Score 24   Daily Activity Standardized Score (Calc for Raw Score >=11) 57.54   AM-PAC Applied Cognition Inpatient   Following a Speech/Presentation 4   Understanding Ordinary Conversation 4   Taking Medications 4   Remembering Where Things Are Placed or Put Away 4   Remembering List of 4-5 Errands 4   Taking Care of Complicated Tasks 4   Applied Cognition Raw Score 24   Applied Cognition Standardized Score 62.21

## 2023-08-14 NOTE — PHYSICAL THERAPY NOTE
Physical Therapy Evaluation    Patient Name: Tara Dupree    JPWXV'H Date: 8/14/2023     Problem List  Principal Problem:    Diverticulitis  Active Problems:    Hypokalemia    Type 2 diabetes mellitus ([address])    H/O TIA (transient ischemic attack) and stroke    KAYLA (acute kidney injury) ([address])    Hypothyroidism    Hypertension    Bradycardia    Constipation    Hypomagnesemia    Liver mass    Multiple renal cysts    Adrenal adenoma, left       Past Medical History  Past Medical History:   Diagnosis Date    Diverticulitis     Hypertension     TIA (transient ischemic attack)         Past Surgical History  Past Surgical History:   Procedure Laterality Date    COLECTOMY             08/14/23 0857   PT Last Visit   PT Visit Date 08/14/23   Note Type   Note type Evaluation   Pain Assessment   Pain Assessment Tool 0-10   Pain Score No Pain   Restrictions/Precautions   Weight Bearing Precautions Per Order No   Other Precautions Multiple lines;Telemetry   Home Living   Type of Home House   Home Layout Two level;Performs ADLs on one level; Able to live on main level with bedroom/bathroom;Stairs to enter without rails  (1 LILIAN)   Bathroom Shower/Tub Tub/shower unit   [employer] Raised   Bathroom Equipment Grab bars in shower; Shower chair;Grab bars around toilet   [address] Walker;Cane;Wheelchair-manual   Additional Comments pt denies use of DME at baseline   Prior Function   Level of [place] Independent with ADLs; Independent with functional mobility; Independent with IADLS   Lives With Alone   IADLs Independent with driving   Falls in the last 6 months 0   Vocational Retired   General   Family/Caregiver Present No   Cognition   Overall Cognitive Status WFL   Arousal/Participation Alert   Orientation Level Oriented X4   Following Commands Follows all commands and directions without difficulty   Comments pt pleasant and cooperative Subjective   Subjective pt reports feeling stressed/overwhelmed since her  passed away last month   RLE Assessment   RLE Assessment WFL   LLE Assessment   LLE Assessment WFL   Bed Mobility   Supine to Sit 6  Modified independent   Additional items Bedrails   Sit to Supine   (pt OOB at end of session)   Transfers   Sit to Stand 7  Independent   Stand to Sit 7  Independent   Toilet transfer 7  Independent   Additional Comments no device used   Ambulation/Elevation   Gait pattern WNL   Gait Assistance 7  Independent   Assistive Device None  (pt managing her IV pole at times)   Distance 50'   Balance   Static Sitting Good   Dynamic Sitting Good   Static Standing Good   Dynamic Standing Good   Ambulatory Good   Endurance Deficit   Endurance Deficit No   Activity Tolerance   Activity Tolerance Patient tolerated treatment well   Assessment   Prognosis Good   Assessment Patient is a 79 y.o. female evaluated by Physical Therapy s/p admit to [address] [place] [address] on 8/13/2023 with admitting diagnosis of: Hypokalemia, Nausea, Abdominal pain, LLQ abdominal pain, KAYLA (acute kidney injury), Acute diverticulitis, and principal problem of: Diverticulitis. PT was consulted to assess patient's functional mobility and discharge needs. Ordered are PT Evaluation and treatment with activity level of: up and OOB as tolerated. Comorbidities affecting patient's physical performance at time of assessment include: diverticulitis, hx of TIA, HTN. Personal factors affecting the patient at time of IE include: step(s) to enter home. Please locate objective findings from PT assessment regarding body systems outlined above. Upon evaluation, pt able to perform all function mobility independently without assistive device. Pt able to ambulate ~50 feet within room, perform toileting, grooming tasks and sink, and oral care prior to taking seated rest break.  Pt demonstrating WFL strength, balance, and endurance; voicing no concerns about her mobility at this time. Continued PT intervention not indicated at this time; will d/c PT orders. Pt encouraged to continue ambulating ad will to remain active during LOS. The patient's AM-PAC Basic Mobility Inpatient Short Form Raw Score is 24. A Raw score of greater than 16 suggests the patient may benefit from discharge to home. Please also refer to the recommendation of the Physical Therapist for safe discharge planning. Co treatment with OT secondary to complex medical condition of pt, possible A of 2 required to achieve and maintain transitional movements, requiring the need of skilled therapeutic intervention of 2 therapists to achieve delivery of services. Goals   Patient Goals to go home   Plan   PT Frequency Other (Comment)  (eval only)   Recommendation   PT Discharge Recommendation No rehabilitation needs   AM-PAC Basic Mobility Inpatient   Turning in Flat Bed Without Bedrails 4   Lying on Back to Sitting on Edge of Flat Bed Without Bedrails 4   Moving Bed to Chair 4   Standing Up From Chair Using Arms 4   Walk in Room 4   Climb 3-5 Stairs With Railing 4   Basic Mobility Inpatient Raw Score 24   Basic Mobility Standardized Score 57.68   Highest Level Of Mobility   JH-HLM Goal 8: Walk 250 feet or more   JH-HLM Achieved 7: Walk 25 feet or more   End of Consult   Patient Position at End of Consult Bedside chair; All needs within reach

## 2023-08-14 NOTE — QUICK NOTE
Called patient's daughter Annika Hopkins. Updated regarding management plan. answered all questions.

## 2023-08-14 NOTE — PROGRESS NOTES
[address]  Progress Note  Name: Kary Dotson  MRN: [id number]  Unit/Bed#: 006-94 I Date of Admission: 8/13/2023   Date of Service: 8/14/2023 I Hospital Day: 1    Assessment/Plan   * Diverticulitis  Assessment & Plan  Background: h/o recurrent episodes of Diverticulitis. She was in the ED on 08/05/2023. Discharged home on Ciprofloxacin and Metronidazole. Patient did not take Metronidazole initially. During PCP follow up visit on 08/11/23, antibiotics changed to Augmentin and also advised to take metronidazole. Poor PO intake because of nausea. Presented to the ED with worsening abdominal pian and nausea. CT abdomen 08/13/23:   - There is colonic diverticulosis. Interval decrease in the amount of fluid and fat stranding in the left lower quadrant  - There is interval development of inflammatory changes surrounding a jejunal diverticulum in the left lower quadrant which is filled with debris and demonstrates a thick wall concerning for jejunal diverticulitis. A second enlarged jejunal diverticula which does not demonstrate inflammatory changes is seen on. Continue ceftriaxone IV 2 gm q 24 hr and Metronidazole  mg q8h  IV Zofran for nausea  IVF for hydration  Diet advanced to clears this AM  General surgery consulted  I&O's  Monitor vitals  Follow Blood Cultures x2    KAYLA (acute kidney injury) [facility])  Assessment & Plan  Recent Labs     08/13/23  1540 08/14/23  0517   CREATININE 1.77* 1.52*   EGFR 26 31     Estimated Creatinine Clearance: 27.3 mL/min (A) (by C-G formula based on SCr of 1.52 mg/dL (H)). Baseline around 0.9.    KAYLA secondary to dehydration due to poor PO intake  Appear dehydrated on admission    Sr. Creatininie improving  Continue IVF   Follow BMP  Avoid hypotension  Avoid nephrotoxic agents      Hypomagnesemia  Assessment & Plan  Replete and keep Mg at 2  Follow magnesium level    Constipation  Assessment & Plan  Intermittent constipation  Uses Metamucil as needed  Not on stool softeners at home  Recommend stool softeners on discharge. Bradycardia  Assessment & Plan  Sinus bradycardia  Present at the time of admission. Hold metoprolol  Telemetry monitoring -   Monitor vitals    Hypertension  Assessment & Plan  PTA medication: Enlapril 10 mg, metoprolol 12.5 mg, Hydrochlorothiazide 25 mg daily. Blood pressure in low normal range with bradycardia on admission    BP stable, intermittent bradycardia overnight  Continue to hold antihypertensive medications  Follow blood pressure trend    Hypothyroidism  Assessment & Plan  Continue home levothyroxine 88 mcg daily  Check TSH and F T4    H/O TIA (transient ischemic attack) and stroke  Assessment & Plan  Asymptomatic  Continue Aspirin 81 mg daily    Type 2 diabetes mellitus ([address])  Assessment & Plan  Lab Results   Component Value Date    HGBA1C 6.0 (H) 07/26/2023       Recent Labs     08/14/23  0726   POCGLU 92       Blood Sugar Average: Last 72 hrs:  Previously on Metformin 750 mg daily, discontinued by her PCP during last visit on 08/01/2023. ISS with Accu check AC/HS  Hypoglycemia protocol    Hypokalemia  Assessment & Plan  Repleted  Potassium 3.2 on arrival; Received IV supplements  Replete and keep K at 4  Follow Potassium level           VTE Pharmacologic Prophylaxis:   Pharmacologic: Heparin  Mechanical VTE Prophylaxis in Place: Yes    Patient Centered Rounds: I have performed bedside rounds with nursing staff today. Discussions with Specialists or Other Care Team Provider: yes    Education and Discussions with Family / Patient: patient    Time Spent for Care: 20 minutes. More than 50% of total time spent on counseling and coordination of care as described above.     Current Length of Stay: 1 day(s)    Current Patient Status: Inpatient   Certification Statement: The patient will continue to require additional inpatient hospital stay due to ongoing treatment    Discharge Plan: in 24 - 48 hrs    Code Status: Level 1 - Full Code      Subjective:   I have seen and examined the patient at bedside today. Not in distress. No overnight events. Reports feeling better. Abdominal pain improved compared to yesterday. Nausea improved. Passing gas. No BM in last 2 days. Objective:     Vitals:   Temp (24hrs), Av.1 °F (36.7 °C), Min:97.7 °F (36.5 °C), Max:98.7 °F (37.1 °C)    Temp:  [97.7 °F (36.5 °C)-98.7 °F (37.1 °C)] 97.9 °F (36.6 °C)  HR:  [53-71] 53  Resp:  [16-19] 18  BP: (104-130)/(55-69) 105/57  SpO2:  [92 %-96 %] 92 %  Body mass index is 25.06 kg/m². Input and Output Summary (last 24 hours): Intake/Output Summary (Last 24 hours) at 2023 0904  Last data filed at 2023 0817  Gross per 24 hour   Intake 50 ml   Output --   Net 50 ml       Physical Exam:     Physical Exam  Vitals and nursing note reviewed. Constitutional:       General: She is not in acute distress. Appearance: She is well-developed. HENT:      Head: Normocephalic and atraumatic. Right Ear: External ear normal.      Left Ear: External ear normal.      Nose: Nose normal.      Mouth/Throat:      Mouth: Mucous membranes are moist.      Pharynx: Oropharynx is clear. Eyes:      General: No scleral icterus. Extraocular Movements: Extraocular movements intact. Conjunctiva/sclera: Conjunctivae normal.      Pupils: Pupils are equal, round, and reactive to light. Cardiovascular:      Rate and Rhythm: Regular rhythm. Bradycardia present. Pulses: Normal pulses. Heart sounds: Normal heart sounds. Pulmonary:      Effort: Pulmonary effort is normal. No respiratory distress. Breath sounds: Normal breath sounds. No wheezing. Abdominal:      General: Bowel sounds are normal.      Palpations: Abdomen is soft. Tenderness: There is abdominal tenderness ( mild tenderness in LLQ on deep palpation ). There is no guarding or rebound. Musculoskeletal:         General: No swelling or tenderness.       Cervical back: Neck supple. Right lower leg: No edema. Left lower leg: No edema. Skin:     General: Skin is warm and dry. Capillary Refill: Capillary refill takes less than 2 seconds. Neurological:      Mental Status: She is alert and oriented to person, place, and time. Mental status is at baseline. Psychiatric:         Mood and Affect: Mood normal.         Additional Data:     Labs:    Results from last 7 days   Lab Units 08/14/23  0517   WBC Thousand/uL 7.54   HEMOGLOBIN g/dL 11.9   HEMATOCRIT % 37.5   PLATELETS Thousands/uL 270   NEUTROS PCT % 78*   LYMPHS PCT % 13*   MONOS PCT % 7   EOS PCT % 2     Results from last 7 days   Lab Units 08/14/23  0517   SODIUM mmol/L 141   POTASSIUM mmol/L 3.6   CHLORIDE mmol/L 103   CO2 mmol/L 30   BUN mg/dL 23   CREATININE mg/dL 1.52*   ANION GAP mmol/L 8   CALCIUM mg/dL 8.7   ALBUMIN g/dL 3.1*   TOTAL BILIRUBIN mg/dL 0.41   ALK PHOS U/L 49   ALT U/L 5*   AST U/L 9*   GLUCOSE RANDOM mg/dL 87     Results from last 7 days   Lab Units 08/13/23  1540   INR  1.06     Results from last 7 days   Lab Units 08/14/23  0726   POC GLUCOSE mg/dl 92         Results from last 7 days   Lab Units 08/14/23  0520 08/13/23  1540   LACTIC ACID mmol/L  --  1.0   PROCALCITONIN ng/ml 0.11 0.13           * I Have Reviewed All Lab Data Listed Above. * Additional Pertinent Lab Tests Reviewed: [facility] Admission Reviewed    Imaging:    Imaging Reports Reviewed Today Include:   CT abdomen pelvis wo contrast   Final Result      There is colonic diverticulosis. Interval decrease in the amount of fluid and fat stranding in the left lower quadrant. .      There is interval development of inflammatory changes surrounding a jejunal diverticulum in the left lower quadrant which is filled with debris and demonstrates a thick wall concerning for jejunal diverticulitis, 601:59. . A second enlarged jejunal    diverticula which does not demonstrate inflammatory changes is seen on 601:57. The study was marked in [facility] for immediate notification. Workstation performed: [id number]           Imaging Personally Reviewed by Myself Includes:  na    Recent Cultures (last 7 days):           Last 24 Hours Medication List:   Current Facility-Administered Medications   Medication Dose Route Frequency Provider Last Rate   • acetaminophen  650 mg Oral Q6H PRN Prisca Garcia MD     • ALPRAZolam  0.25 mg Oral BID PRN Prisca Garcia MD     • aspirin  81 mg Oral Daily Prisca Garcia MD     • calcium carbonate  1,000 mg Oral Daily PRN Prisca Garcia MD     • cefTRIAXone  2,000 mg Intravenous Q24H Prisca Garcia MD     • docusate sodium  100 mg Oral BID PRN Prisca Garcia MD     • heparin (porcine)  5,000 Units Subcutaneous Q8H [address] Prisca Garcia MD     • insulin lispro  1-5 Units Subcutaneous TID AC Prisca Garcia MD     • levothyroxine  88 mcg Oral Early Morning Prisca Garcia MD     • magnesium Oxide  400 mg Oral BID Prisca Garcia MD     • metroNIDAZOLE  500 mg Intravenous Q8H Prisca Garcia  mg (08/14/23 0300)   • nicotine  1 patch Transdermal Daily Prisca Garcia MD     • ondansetron  4 mg Intravenous Q6H PRN Prisca Garcia MD     • pravastatin  80 mg Oral Daily With Chano Cage MD     • sodium chloride  125 mL/hr Intravenous Continuous Prisca Garcia MD          Today, Patient Was Seen By: Prisca Garcia MD    ** Please Note: Dictation voice to text software may have been used in the creation of this document.  **

## 2023-08-14 NOTE — CASE MANAGEMENT
Case Management Assessment & Discharge Planning Note    Patient name Elizabeth Wilson  Location /441-34 MRN [id number]  : 1940 Date 2023       Current Admission Date: 2023  Current Admission Diagnosis:Diverticulitis   Patient Active Problem List    Diagnosis Date Noted   • Liver mass 2023   • Diverticulitis 2023   • Hypokalemia 2023   • Type 2 diabetes mellitus ([address]) 2023   • H/O TIA (transient ischemic attack) and stroke 2023   • KAYLA (acute kidney injury) ([address]) 2023   • Hypothyroidism 2023   • Hypertension 2023   • Bradycardia 2023   • Constipation 2023   • Hypomagnesemia 2023      LOS (days): 1  Geometric Mean LOS (GMLOS) (days):   Days to GMLOS:     OBJECTIVE:    Risk of Unplanned Readmission Score: 14.01         Current admission status: Inpatient       Preferred Pharmacy:   [address] [place] [address], [address] [place] [address]  Phone: [phone] Fax: [phone]    [facility], [address] [place] [address]  Phone: [phone] Fax: [phone]    Primary Care Provider: No primary care provider on file. Primary Insurance: MEDICARE  Secondary Insurance: AETNA    ASSESSMENT:  Active Health Care Proxies    There are no active Health Care Proxies on file.        Advance Directives  Does patient have a [address]?: No  Was patient offered paperwork?: Yes (declines)  Does patient currently have a Health Care decision maker?: Yes, please see Health Care Proxy section  Does patient have Advance Directives?: No  Was patient offered paperwork?: Yes (declines)  Primary Contact: Lisa Segundo (Daughter)   [phone] (M)         Readmission Root Cause  30 Day Readmission: No    Patient Information  Admitted from[de-identified] Home  Mental Status: Alert  During Assessment patient was accompanied by: Not accompanied during assessment  Assessment information provided by[de-identified] Patient  Primary Caregiver: Self  Support Systems: Son, Daughter  [place] of Residence: Other (specify in comment box) Patricia Arenas)  What city do you live in[place] [facility] entry access options.  Select all that apply.: Stairs  Number of steps to enter home.: 1  Type of Current Residence: 2 story home  Upon entering residence, is there a bedroom on the main floor (no further steps)?: Yes  Upon entering residence, is there a bathroom on the main floor (no further steps)?: Yes  In the last 12 months, was there a time when you were not able to pay the mortgage or rent on time?: No  In the last 12 months, how many places have you lived?: 1  In the last 12 months, was there a time when you did not have a steady place to sleep or slept in a shelter (including now)?: No  Homeless/housing insecurity resource given?: N/A  Living Arrangements: Lives Alone  Is patient a ?: No    Activities of Daily Living Prior to Admission  Functional Status: Independent  Completes ADLs independently?: Yes  Ambulates independently?: Yes  Does patient use assisted devices?: No  Does patient currently own DME?: Yes  What DME does the patient currently own?: [employer]  Does patient have a history of Outpatient Therapy (PT/OT)?: No  Does the patient have a history of Short-Term Rehab?: No  Does patient have a history of HHC?: No  Does patient currently have [facility]?: No         Patient Information Continued  Income Source: Pension/long term  Does patient have prescription coverage?: Yes  Within the past 12 months, you worried that your food would run out before you got the money to buy more.: Never true  Within the past 12 months, the food you bought just didn't last and you didn't have money to get more.: Never true  Food insecurity resource given?: N/A  Does patient receive dialysis treatments?: No  Does patient have a history of substance abuse?: No  Does patient have a history of Mental Health Diagnosis?: No         Means of Transportation  Means of Transport to Appts[de-identified] Family transport  In the past 12 months, has lack of transportation kept you from medical appointments or from getting medications?: No  In the past 12 months, has lack of transportation kept you from meetings, work, or from getting things needed for daily living?: No  Was application for public transport provided?: N/A        DISCHARGE DETAILS:    Discharge planning discussed with[de-identified] patient        CM contacted family/caregiver?: No- see comments (declines)  Were Treatment Team discharge recommendations reviewed with patient/caregiver?: Yes  Did patient/caregiver verbalize understanding of patient care needs?: Yes  Were patient/caregiver advised of the risks associated with not following Treatment Team discharge recommendations?: Yes         [address]         Is the patient interested in [address] [facility] at discharge?: No    DME Referral Provided  Referral made for DME?: No         Would you like to participate in our [address] service program?  : No - Declined       Discharge Destination Plan[de-identified] Home  Transport at Discharge : Family      Plans at this time are home on dc with OP follow up. Cm will follow and assist in dc planning.

## 2023-08-14 NOTE — PLAN OF CARE
Problem: PAIN - ADULT  Goal: Verbalizes/displays adequate comfort level or baseline comfort level  Description: Interventions:  - Encourage patient to monitor pain and request assistance  - Assess pain using appropriate pain scale (0-10 pain scale)  - Administer analgesics based on type and severity of pain and evaluate response  - Implement non-pharmacological measures as appropriate and evaluate response  - Consider cultural and social influences on pain and pain management  - Notify physician/advanced practitioner if interventions unsuccessful or patient reports new pain  Outcome: Progressing     Problem: INFECTION - ADULT  Goal: Absence or prevention of progression during hospitalization  Description: INTERVENTIONS:  - Assess and monitor for signs and symptoms of infection  - Monitor lab/diagnostic results  - Monitor all insertion sites, i.e. indwelling lines  - Administer medications as ordered  - Instruct and encourage patient and family to use good hand hygiene technique  Outcome: Progressing     Problem: SAFETY ADULT  Goal: Patient will remain free of falls  Description: INTERVENTIONS:  - Educate patient/family on patient safety including physical limitations  - Instruct patient to call for assistance with activity (independent)  - Consult OT/PT to assist with strengthening/mobility   - Keep Call bell within reach  - Keep bed low and locked with side rails adjusted as appropriate  - Keep care items and personal belongings within reach  - Initiate and maintain comfort rounds  - Make Fall Risk Sign visible to staff (moderate fall risk)  - Offer Toileting every 1-2 Hours, in advance of need  - Obtain necessary fall risk management equipment: nonskid footwear  - Apply yellow socks and bracelet for high fall risk patients  Outcome: Progressing  Goal: Maintain or return to baseline ADL function  Description: INTERVENTIONS:  -  Assess patient's ability to carry out ADLs; (independent)  - Assess patient's mobility; (independent)  - Assess patient's need for assistive devices and provide as appropriate  - Encourage maximum independence but intervene and supervise when necessary  - Assess for home care needs following discharge   - Consider OT consult to assist with ADL evaluation and planning for discharge  - Provide patient education as appropriate  Outcome: Progressing  Goal: Maintains/Returns to pre admission functional level  Description: INTERVENTIONS:  - Perform BMAT or MOVE assessment daily.   - Set and communicate daily mobility goal to care team and patient/family/caregiver. - Collaborate with rehabilitation services on mobility goals if consulted  - Ambulate patient 4-6 times a day  - Out of bed to chair 3 times a day   - Out of bed for meals 3 times a day  - Out of bed for toileting  - Record patient progress and toleration of activity level   Outcome: Progressing     Problem: DISCHARGE PLANNING  Goal: Discharge to home or other facility with appropriate resources  Description: INTERVENTIONS:  - Identify barriers to discharge w/patient and caregiver  - Arrange for needed discharge resources and transportation as appropriate  - Identify discharge learning needs (meds, wound care, etc.)  - Refer to Case Management Department for coordinating discharge planning if the patient needs post-hospital services based on physician/advanced practitioner order or complex needs related to functional status, cognitive ability, or social support system  Outcome: Progressing     Problem: Knowledge Deficit  Goal: Patient/family/caregiver demonstrates understanding of disease process, treatment plan, medications, and discharge instructions  Description: Complete learning assessment and assess knowledge base.   Interventions:  - Provide teaching at level of understanding  - Provide teaching via preferred learning methods  Outcome: Progressing     Problem: GASTROINTESTINAL - ADULT  Goal: Maintains or returns to baseline bowel function  Description: INTERVENTIONS:  - Assess bowel function  - Encourage oral fluids to ensure adequate hydration  - Administer IV fluids if ordered to ensure adequate hydration  - Administer ordered medications as needed  - Encourage mobilization and activity  - Consider nutritional services referral to assist patient with adequate nutrition and appropriate food choices  Outcome: Progressing  Goal: Maintains adequate nutritional intake  Description: INTERVENTIONS:  - Monitor percentage of each meal consumed  - Identify factors contributing to decreased intake, treat as appropriate  - Assist with meals as needed  - Monitor I&O, weight, and lab values if indicated  - Obtain nutrition services referral as needed  Outcome: Progressing     Problem: METABOLIC, FLUID AND ELECTROLYTES - ADULT  Goal: Electrolytes maintained within normal limits  Description: INTERVENTIONS:  - Monitor labs and assess patient for signs and symptoms of electrolyte imbalances  - Administer electrolyte replacement as ordered  - Monitor response to electrolyte replacements, including repeat lab results as appropriate  - Instruct patient on fluid and nutrition as appropriate  Outcome: Progressing  Goal: Fluid balance maintained  Description: INTERVENTIONS:  - Monitor labs   - Monitor I/O and WT  - Instruct patient on fluid and nutrition as appropriate  - Assess for signs & symptoms of volume excess or deficit  Outcome: Progressing  Goal: Glucose maintained within target range  Description: INTERVENTIONS:  - Monitor Blood Glucose as ordered  - Assess for signs and symptoms of hyperglycemia and hypoglycemia  - Administer ordered medications to maintain glucose within target range  - Assess nutritional intake and initiate nutrition service referral as needed  Outcome: Progressing

## 2023-08-15 PROBLEM — E83.42 HYPOMAGNESEMIA: Status: RESOLVED | Noted: 2023-08-13 | Resolved: 2023-08-15

## 2023-08-15 LAB
AFP-TM SERPL-MCNC: 4.42 NG/ML (ref 0–9)
ALBUMIN SERPL BCP-MCNC: 3 G/DL (ref 3.5–5)
ALP SERPL-CCNC: 45 U/L (ref 34–104)
ALT SERPL W P-5'-P-CCNC: 4 U/L (ref 7–52)
ANION GAP SERPL CALCULATED.3IONS-SCNC: 7 MMOL/L
AST SERPL W P-5'-P-CCNC: 8 U/L (ref 13–39)
ATRIAL RATE: 67 BPM
ATRIAL RATE: 69 BPM
BACTERIA UR CULT: NORMAL
BASOPHILS # BLD AUTO: 0.02 THOUSANDS/ÂΜL (ref 0–0.1)
BASOPHILS NFR BLD AUTO: 0 % (ref 0–1)
BILIRUB SERPL-MCNC: 0.33 MG/DL (ref 0.2–1)
BUN SERPL-MCNC: 17 MG/DL (ref 5–25)
CALCIUM ALBUM COR SERPL-MCNC: 9.3 MG/DL (ref 8.3–10.1)
CALCIUM SERPL-MCNC: 8.5 MG/DL (ref 8.4–10.2)
CHLORIDE SERPL-SCNC: 105 MMOL/L (ref 96–108)
CO2 SERPL-SCNC: 30 MMOL/L (ref 21–32)
CREAT SERPL-MCNC: 1.3 MG/DL (ref 0.6–1.3)
EOSINOPHIL # BLD AUTO: 0.12 THOUSAND/ÂΜL (ref 0–0.61)
EOSINOPHIL NFR BLD AUTO: 2 % (ref 0–6)
ERYTHROCYTE [DISTWIDTH] IN BLOOD BY AUTOMATED COUNT: 13.5 % (ref 11.6–15.1)
GFR SERPL CREATININE-BSD FRML MDRD: 38 ML/MIN/1.73SQ M
GLUCOSE SERPL-MCNC: 104 MG/DL (ref 65–140)
GLUCOSE SERPL-MCNC: 108 MG/DL (ref 65–140)
GLUCOSE SERPL-MCNC: 85 MG/DL (ref 65–140)
GLUCOSE SERPL-MCNC: 88 MG/DL (ref 65–140)
HCT VFR BLD AUTO: 36.6 % (ref 34.8–46.1)
HGB BLD-MCNC: 11.7 G/DL (ref 11.5–15.4)
IMM GRANULOCYTES # BLD AUTO: 0.02 THOUSAND/UL (ref 0–0.2)
IMM GRANULOCYTES NFR BLD AUTO: 0 % (ref 0–2)
LYMPHOCYTES # BLD AUTO: 0.95 THOUSANDS/ÂΜL (ref 0.6–4.47)
LYMPHOCYTES NFR BLD AUTO: 15 % (ref 14–44)
MAGNESIUM SERPL-MCNC: 1.9 MG/DL (ref 1.9–2.7)
MCH RBC QN AUTO: 28.7 PG (ref 26.8–34.3)
MCHC RBC AUTO-ENTMCNC: 32 G/DL (ref 31.4–37.4)
MCV RBC AUTO: 90 FL (ref 82–98)
MONOCYTES # BLD AUTO: 0.42 THOUSAND/ÂΜL (ref 0.17–1.22)
MONOCYTES NFR BLD AUTO: 7 % (ref 4–12)
NEUTROPHILS # BLD AUTO: 4.91 THOUSANDS/ÂΜL (ref 1.85–7.62)
NEUTS SEG NFR BLD AUTO: 76 % (ref 43–75)
NRBC BLD AUTO-RTO: 0 /100 WBCS
P AXIS: 84 DEGREES
P AXIS: 86 DEGREES
PHOSPHATE SERPL-MCNC: 2.5 MG/DL (ref 2.3–4.1)
PLATELET # BLD AUTO: 263 THOUSANDS/UL (ref 149–390)
PMV BLD AUTO: 10.7 FL (ref 8.9–12.7)
POTASSIUM SERPL-SCNC: 3.5 MMOL/L (ref 3.5–5.3)
PR INTERVAL: 152 MS
PR INTERVAL: 156 MS
PROCALCITONIN SERPL-MCNC: 0.09 NG/ML
PROT SERPL-MCNC: 5.5 G/DL (ref 6.4–8.4)
QRS AXIS: 59 DEGREES
QRS AXIS: 72 DEGREES
QRSD INTERVAL: 86 MS
QRSD INTERVAL: 88 MS
QT INTERVAL: 398 MS
QT INTERVAL: 436 MS
QTC INTERVAL: 426 MS
QTC INTERVAL: 460 MS
RBC # BLD AUTO: 4.07 MILLION/UL (ref 3.81–5.12)
SODIUM SERPL-SCNC: 142 MMOL/L (ref 135–147)
T WAVE AXIS: 46 DEGREES
T WAVE AXIS: 60 DEGREES
VENTRICULAR RATE: 67 BPM
VENTRICULAR RATE: 69 BPM
WBC # BLD AUTO: 6.44 THOUSAND/UL (ref 4.31–10.16)

## 2023-08-15 PROCEDURE — 82948 REAGENT STRIP/BLOOD GLUCOSE: CPT

## 2023-08-15 PROCEDURE — 82105 ALPHA-FETOPROTEIN SERUM: CPT | Performed by: INTERNAL MEDICINE

## 2023-08-15 PROCEDURE — 99232 SBSQ HOSP IP/OBS MODERATE 35: CPT

## 2023-08-15 PROCEDURE — 84145 PROCALCITONIN (PCT): CPT | Performed by: INTERNAL MEDICINE

## 2023-08-15 PROCEDURE — 83735 ASSAY OF MAGNESIUM: CPT

## 2023-08-15 PROCEDURE — 93010 ELECTROCARDIOGRAM REPORT: CPT | Performed by: INTERNAL MEDICINE

## 2023-08-15 PROCEDURE — 85025 COMPLETE CBC W/AUTO DIFF WBC: CPT | Performed by: INTERNAL MEDICINE

## 2023-08-15 PROCEDURE — 80053 COMPREHEN METABOLIC PANEL: CPT | Performed by: INTERNAL MEDICINE

## 2023-08-15 PROCEDURE — 84100 ASSAY OF PHOSPHORUS: CPT | Performed by: INTERNAL MEDICINE

## 2023-08-15 RX ADMIN — CEFTRIAXONE 2000 MG: 2 INJECTION, SOLUTION INTRAVENOUS at 15:38

## 2023-08-15 RX ADMIN — SODIUM CHLORIDE 100 ML/HR: 0.9 INJECTION, SOLUTION INTRAVENOUS at 11:25

## 2023-08-15 RX ADMIN — ONDANSETRON 4 MG: 2 INJECTION INTRAMUSCULAR; INTRAVENOUS at 00:09

## 2023-08-15 RX ADMIN — DOCUSATE SODIUM 100 MG: 100 CAPSULE, LIQUID FILLED ORAL at 08:34

## 2023-08-15 RX ADMIN — ASPIRIN 81 MG: 81 TABLET, COATED ORAL at 08:34

## 2023-08-15 RX ADMIN — ONDANSETRON 4 MG: 2 INJECTION INTRAMUSCULAR; INTRAVENOUS at 19:18

## 2023-08-15 RX ADMIN — METRONIDAZOLE 500 MG: 500 INJECTION, SOLUTION INTRAVENOUS at 18:16

## 2023-08-15 RX ADMIN — HEPARIN SODIUM 5000 UNITS: 5000 INJECTION INTRAVENOUS; SUBCUTANEOUS at 05:31

## 2023-08-15 RX ADMIN — METRONIDAZOLE 500 MG: 500 INJECTION, SOLUTION INTRAVENOUS at 10:03

## 2023-08-15 RX ADMIN — PRAVASTATIN SODIUM 80 MG: 40 TABLET ORAL at 18:17

## 2023-08-15 RX ADMIN — HEPARIN SODIUM 5000 UNITS: 5000 INJECTION INTRAVENOUS; SUBCUTANEOUS at 15:39

## 2023-08-15 RX ADMIN — METRONIDAZOLE 500 MG: 500 INJECTION, SOLUTION INTRAVENOUS at 01:49

## 2023-08-15 RX ADMIN — SODIUM CHLORIDE 100 ML/HR: 0.9 INJECTION, SOLUTION INTRAVENOUS at 22:59

## 2023-08-15 RX ADMIN — LEVOTHYROXINE SODIUM 88 MCG: 88 TABLET ORAL at 05:31

## 2023-08-15 RX ADMIN — DOCUSATE SODIUM 100 MG: 100 CAPSULE, LIQUID FILLED ORAL at 18:17

## 2023-08-15 RX ADMIN — HEPARIN SODIUM 5000 UNITS: 5000 INJECTION INTRAVENOUS; SUBCUTANEOUS at 21:00

## 2023-08-15 NOTE — ASSESSMENT & PLAN NOTE
Recent Labs     08/13/23  1540 08/14/23  0517 08/15/23  0422   CREATININE 1.77* 1.52* 1.30   EGFR 26 31 38     Estimated Creatinine Clearance: 31.9 mL/min (by C-G formula based on SCr of 1.3 mg/dL). · Baseline around 0.9.    · KAYLA secondary to dehydration due to poor PO intake  · Continue to hold HCTZ  · Improved

## 2023-08-15 NOTE — ASSESSMENT & PLAN NOTE
Hx recurrent diverticulitis. Failed oral Cipro/Flagyl, then Augmentin/Flagyl - notes GI intolerance to Flagy  · CT abdomen 08/13/23: There is colonic diverticulosis. Interval decrease in the amount of fluid and fat stranding in the left lower quadrant. There is interval development of inflammatory changes surrounding a jejunal diverticulum in the left lower quadrant which is filled with debris and demonstrates a thick wall concerning for jejunal diverticulitis.  A second enlarged jejunal diverticula which does not demonstrate inflammatory changes is seen on  · Continue Rocephin/Flagyl (D2)  · Diet advanced at lunch per surg   · Pain controlled with current regimen - still c/o intermittent nausea  · Blood cultures NGTD x4  · Appreciate general surgery input

## 2023-08-15 NOTE — ASSESSMENT & PLAN NOTE
· Asymptomatic sinus bradycardia  · POA  · Hold metoprolol  · Telemetry monitoring -  · Monitor vitals

## 2023-08-15 NOTE — ASSESSMENT & PLAN NOTE
Lab Results   Component Value Date    HGBA1C 6.0 (H) 07/26/2023       Recent Labs     08/14/23  1047 08/14/23  1500 08/14/23  2038 08/15/23  0719   POCGLU 89 129 108 85       Blood Sugar Average: Last 72 hrs:  · Previously on Metformin 750 mg daily, discontinued by her PCP during last visit on 08/01/2023.   · ISS with Accu check AC/HS

## 2023-08-15 NOTE — PROGRESS NOTES
[address]  Progress Note  Name: Chloe Mota  MRN: [id number]  Unit/Bed#: 351-02 I Date of Admission: 8/13/2023   Date of Service: 8/15/2023 I Hospital Day: 2    Assessment/Plan   * Acute diverticulitis  Assessment & Plan  Hx recurrent diverticulitis. Failed oral Cipro/Flagyl, then Augmentin/Flagyl - notes GI intolerance to Flagy  · CT abdomen 08/13/23: There is colonic diverticulosis. Interval decrease in the amount of fluid and fat stranding in the left lower quadrant. There is interval development of inflammatory changes surrounding a jejunal diverticulum in the left lower quadrant which is filled with debris and demonstrates a thick wall concerning for jejunal diverticulitis. A second enlarged jejunal diverticula which does not demonstrate inflammatory changes is seen on  · Continue Rocephin/Flagyl (D2)  · Diet advanced at lunch per surg   · Pain controlled with current regimen - still c/o intermittent nausea  · Blood cultures NGTD x4  · Appreciate general surgery input    Constipation  Assessment & Plan  · Intermittent constipation  · Uses Metamucil as needed  · Not on stool softeners at home  · Recommend stool softeners on discharge. Bradycardia  Assessment & Plan  · Asymptomatic sinus bradycardia  · POA  · Hold metoprolol  · Telemetry monitoring -  · Monitor vitals    Hypertension  Assessment & Plan  · PTA medication: Enlapril 10 mg, metoprolol 12.5 mg, Hydrochlorothiazide 25 mg daily. · BP stable - borderline soft with all antihypertensives held   · Continue to hold    Hypothyroidism  Assessment & Plan  · Continue home levothyroxine 88 mcg daily      KAYLA (acute kidney injury) [facility])  Assessment & Plan  Recent Labs     08/13/23  1540 08/14/23  0517 08/15/23  0422   CREATININE 1.77* 1.52* 1.30   EGFR 26 31 38     Estimated Creatinine Clearance: 31.9 mL/min (by C-G formula based on SCr of 1.3 mg/dL). · Baseline around 0.9.    · KAYLA secondary to dehydration due to poor PO intake  · Continue to hold HCTZ  · Improved       H/O TIA (transient ischemic attack) and stroke  Assessment & Plan  · Asymptomatic  · Continue Aspirin 81 mg daily    Type 2 diabetes mellitus [facility])  Assessment & Plan  Lab Results   Component Value Date    HGBA1C 6.0 (H) 2023       Recent Labs     23  1047 23  1500 08/14/23  2038 08/15/23  0719   POCGLU 89 129 108 85       Blood Sugar Average: Last 72 hrs:  · Previously on Metformin 750 mg daily, discontinued by her PCP during last visit on 2023. · ISS with Accu check AC/HS      Hypokalemia  Assessment & Plan  · Repleted  · Monitor & replete p.r.n               VTE Pharmacologic Prophylaxis: VTE Score: 7 heparin    Patient Centered Rounds:   Discussions with Specialists or Other Care Team Provider: general surg    Education and Discussions with Family / Patient: To call daughter    Total Time Spent on Date of Encounter in care of patient: 35 minutes This time was spent on one or more of the following: performing physical exam; counseling and coordination of care; obtaining or reviewing history; documenting in the medical record; reviewing/ordering tests, medications or procedures; communicating with other healthcare professionals and discussing with patient's family/caregivers. Current Length of Stay: 2 day(s)  Current Patient Status: Inpatient   Certification Statement: The patient will continue to require additional inpatient hospital stay due to IV antibiotics, advancing diet  Discharge Plan: Anticipate discharge tomorrow to home. Code Status: Level 1 - Full Code    Subjective:   Reports feeling improved. Still with some nausea, no vomiting. Pain well controlled. Plan to advance diet this afternoon.  Remains afebrile wo leukocytosis     Objective:     Vitals:   Temp (24hrs), Av °F (36.7 °C), Min:97.8 °F (36.6 °C), Max:98.4 °F (36.9 °C)    Temp:  [97.8 °F (36.6 °C)-98.4 °F (36.9 °C)] 97.9 °F (36.6 °C)  HR:  [46-59] 51  Resp: [18-20] 20  BP: ()/(51-54) 107/54  SpO2:  [93 %-94 %] 94 %  Body mass index is 25.06 kg/m². Input and Output Summary (last 24 hours): Intake/Output Summary (Last 24 hours) at 8/15/2023 1017  Last data filed at 8/15/2023 0846  Gross per 24 hour   Intake 1720 ml   Output --   Net 1720 ml       Physical Exam:   Physical Exam  HENT:      Head: Normocephalic and atraumatic. Cardiovascular:      Rate and Rhythm: Regular rhythm. Bradycardia present. Pulses: Normal pulses. Heart sounds: Normal heart sounds. Pulmonary:      Effort: Pulmonary effort is normal.      Breath sounds: Normal breath sounds. Abdominal:      General: Abdomen is flat. Bowel sounds are normal. There is no distension. Palpations: Abdomen is soft. Tenderness: There is abdominal tenderness (mild LLQ). There is no guarding. Musculoskeletal:      Right lower leg: No edema. Left lower leg: No edema. Neurological:      General: No focal deficit present. Mental Status: She is alert and oriented to person, place, and time.    Psychiatric:         Mood and Affect: Mood normal.         Behavior: Behavior normal.          Additional Data:   Labs:  Results from last 7 days   Lab Units 08/15/23  0422   WBC Thousand/uL 6.44   HEMOGLOBIN g/dL 11.7   HEMATOCRIT % 36.6   PLATELETS Thousands/uL 263   NEUTROS PCT % 76*   LYMPHS PCT % 15   MONOS PCT % 7   EOS PCT % 2     Results from last 7 days   Lab Units 08/15/23  0422   SODIUM mmol/L 142   POTASSIUM mmol/L 3.5   CHLORIDE mmol/L 105   CO2 mmol/L 30   BUN mg/dL 17   CREATININE mg/dL 1.30   ANION GAP mmol/L 7   CALCIUM mg/dL 8.5   ALBUMIN g/dL 3.0*   TOTAL BILIRUBIN mg/dL 0.33   ALK PHOS U/L 45   ALT U/L 4*   AST U/L 8*   GLUCOSE RANDOM mg/dL 88     Results from last 7 days   Lab Units 08/13/23  1540   INR  1.06     Results from last 7 days   Lab Units 08/15/23  0719 08/14/23  2038 08/14/23  1500 08/14/23  1047 08/14/23  0726   POC GLUCOSE mg/dl 85 108 129 89 92 Results from last 7 days   Lab Units 08/15/23  0422 08/14/23  0520 08/13/23  1540   LACTIC ACID mmol/L  --   --  1.0   PROCALCITONIN ng/ml 0.09 0.11 0.13       Lines/Drains:  Invasive Devices     Peripheral Intravenous Line  Duration           Peripheral IV 08/13/23 Right Antecubital 1 day                  Telemetry:  Telemetry Orders (From admission, onward)             24 Hour Telemetry Monitoring  Continuous x 24 Hours (Telem)        Expiring   Question:  Reason for 24 Hour Telemetry  Answer:  Arrhythmias requiring acute medical intervention / PPM or ICD malfunction                 Telemetry Reviewed: Sinus eduardo  Indication for Continued Telemetry Use: No indication for continued use. Will discontinue. Imaging: Reviewed radiology reports from this admission including: abdominal/pelvic CT    Recent Cultures (last 7 days):   Results from last 7 days   Lab Units 08/13/23  1717 08/13/23  1540   BLOOD CULTURE   --  No Growth at 24 hrs. No Growth at 24 hrs.    URINE CULTURE  No Growth <1000 cfu/mL  --        Last 24 Hours Medication List:   Current Facility-Administered Medications   Medication Dose Route Frequency Provider Last Rate   • acetaminophen  650 mg Oral Q6H PRN Prisca Garcia MD     • ALPRAZolam  0.25 mg Oral BID PRN Prisca Garcia MD     • aspirin  81 mg Oral Daily Prisca Garcia MD     • calcium carbonate  1,000 mg Oral Daily PRN Prisca Garcia MD     • cefTRIAXone  2,000 mg Intravenous Q24H Prisca Garcia MD 2,000 mg (08/14/23 5638)   • docusate sodium  100 mg Oral BID Prisca Garcia MD     • heparin (porcine)  5,000 Units Subcutaneous Q8H [facility] Prisca Garcia MD     • insulin lispro  1-5 Units Subcutaneous TID AC Prisca Garcia MD     • levothyroxine  88 mcg Oral Early Morning Prisca Garcia MD     • metroNIDAZOLE  500 mg Intravenous Q8H Prisca Garcia  mg (08/15/23 1003)   • nicotine  1 patch Transdermal Daily [facility] Benoit Yeh MD     • ondansetron  4 mg Intravenous Q6H PRN Alejandro Dixon MD     • oxyCODONE  5 mg Oral Q6H PRN Derek Aldana DO     • pravastatin  80 mg Oral Daily With Yelitza Rashid MD     • sodium chloride  100 mL/hr Intravenous Continuous Derek Aldana  mL/hr (08/14/23 1487)        Today, Patient Was Seen By: Adriana Nixon PA-C    **Please Note: This note may have been constructed using a voice recognition system. **

## 2023-08-15 NOTE — ASSESSMENT & PLAN NOTE
· Intermittent constipation  · Uses Metamucil as needed  · Not on stool softeners at home  · Recommend stool softeners on discharge.

## 2023-08-15 NOTE — ASSESSMENT & PLAN NOTE
· PTA medication: Enlapril 10 mg, metoprolol 12.5 mg, Hydrochlorothiazide 25 mg daily.    · BP stable - borderline soft with all antihypertensives held   · Continue to hold

## 2023-08-15 NOTE — PROGRESS NOTES
Progress Note - General Surgery   Xavi Siegel 79 y.o. female MRN: [id number]  Unit/Bed#: 402-01 Encounter: [id number]    Assessment:  Acute diverticulitis, failed outpatient p.o. antibiotic therapy and patient intolerance to p.o. metronidazole  Overall feeling better. Some nausea. Minimal left lower quadrant abdominal pain. Tolerated clear liquids since yesterday. History of recurrent diverticulitis for over 20 years  Type 2 diabetes mellitus  Essential hypertension  Hypothyroidism    CT scan of the abdomen pelvis demonstrated focal sigmoid diverticulitis without abscess or free air. Also partially calcified mass of the right liver dome and nonaggressive left adrenal lesion and bilateral simple renal cysts noted. Her last colonoscopy was performed at [facility] by Dr. Ashia Shipman 2 years ago.     Her last hospital admission for acute diverticulitis was about 7 years ago she believes. She has never had any surgery for diverticulitis, she said that she was recommended over 20 years ago for colon resection though she did not want to pursue any surgery at that time. AFP tumor marker pending  CBC this morning entirely normal, WBC 6.44, hemoglobin 11.7. CMP showed normal electrolytes. AST low 8, ALT low at 4. Albumin low 3.0  Total protein low 5.5  Total bilirubin 0.33  Creatinine normal 1.30    Vital signs reviewed, afebrile    Plan:  No indication for general surgical intervention at this time  Discussed with medicine.   The patient's WBC seems to be improving and clinically she is responding  Need to slowly advance diet, will now start surgical soft low fiber low residual diet today as tolerated  Continue IV antibiotics, ceftriaxone and metronidazole  Serial abdominal exams  Out of bed as tolerated  Repeat a.m. labs including CBC and BMP  Concern that the patient was admitted after representing to the ED here initially seen a week ago and failed outpatient p.o. antibiotic management for smoldering acute diverticulitis and the patient developed worsening nausea when she started metronidazole last Friday which then prompted her to seek evaluation in the ED here again. Incidental findings on CT scan of calcified mass at hepatic dome appeared similar to prior exams but recommendation for follow-up repeat CT scan of the abdomen in 3 months. Incidental finding on CT scan regarding left adrenal adenoma unchanged appearance, recommend repeat CT scan in 6 months for follow-up. Incidental finding of 1 or more simple cysts in both kidneys without evidence of renal masses or perinephric collections, no urolithiasis or hydronephrosis. Radiology recommendation for repeat CT scan of the abdomen pelvis in 3 months. The patient needs continued benefit of IV antibiotics as she is clinically improving and failed outpatient p.o. antibiotic management. Anticipate hospital discharge home tomorrow, Wednesday  Follow-up as an outpatient with general surgery in the office in 2 or 3 weeks after discharge  She will likely require repeat colonoscopy in 6 to 8 weeks with outpatient referral to gastroenterology and that can be arranged after she is seen in the office. Subjective/Objective      Chief Complaint: Mild nausea. Overall feeling better. Having bowel function and passing flatus. No fever or chills. Subjective: 65-year-old female with a history of diabetes mellitus, hypertension and hypothyroidism presented again to the ED here within 1 week span because of severe abdominal pain with nausea. Recurrent diverticulitis now for over 20 years with she admits 2 or 3 episodes a year usually managed with outpatient oral antibiotics by her PCP. Patient was discharged from the ED here 8 days ago with p.o. ciprofloxacin and metronidazole, she then continued with symptoms as seen by the PCP on August 11 where she was changed to p.o. Augmentin and metronidazole.   The patient said that she does not tolerate metronidazole because it increased nausea when she started taking 4 days ago and this prompted her to seek reevaluation again in the ED here. CT scan abdomen pelvis was repeated showed no interval development of inflammatory changes surrounding a jejunal diverticulum in the left lower quadrant the abdomen which was filled with debris and demonstrated thick wall concerning for jejunal diverticulitis. She was admitted to medicine and initially kept n.p.o., tolerating advance clear liquids. She is now being treated with IV ceftriaxone and metronidazole. Right now she is resting comfortably. Denies any new overnight complaints.     Scheduled Meds:  Current Facility-Administered Medications   Medication Dose Route Frequency Provider Last Rate   • acetaminophen  650 mg Oral Q6H PRN Eric Galvan MD     • ALPRAZolam  0.25 mg Oral BID PRN Eric Galvan MD     • aspirin  81 mg Oral Daily Eric Galvan MD     • calcium carbonate  1,000 mg Oral Daily PRN Eric Galvan MD     • cefTRIAXone  2,000 mg Intravenous Q24H Eric Galvan MD 2,000 mg (08/14/23 3208)   • docusate sodium  100 mg Oral BID Eric Galvan MD     • heparin (porcine)  5,000 Units Subcutaneous Q8H [address] Eric Galvan MD     • insulin lispro  1-5 Units Subcutaneous TID AC Eric Galvan MD     • levothyroxine  88 mcg Oral Early Morning Eric Galvan MD     • metroNIDAZOLE  500 mg Intravenous Q8H Eric Galvan  mg (08/15/23 1003)   • nicotine  1 patch Transdermal Daily Eric Galvan MD     • ondansetron  4 mg Intravenous Q6H PRN Eric Galvan MD     • oxyCODONE  5 mg Oral Q6H PRN Freddy Aldana DO     • pravastatin  80 mg Oral Daily With Dinner Eric Galvan MD     • sodium chloride  100 mL/hr Intravenous Continuous Freddy Aldana  mL/hr (08/14/23 2141)     Continuous Infusions:sodium chloride, 100 mL/hr, Last Rate: 100 mL/hr (08/14/23 2141)      PRN Meds:.•  acetaminophen  •  ALPRAZolam  •  calcium carbonate  •  ondansetron  •  oxyCODONE      Objective:     Blood pressure 107/54, pulse (!) 51, temperature 97.9 °F (36.6 °C), resp. rate 20, height 5' 7" (1.702 m), weight 72.6 kg (160 lb), SpO2 94 %. ,Body mass index is 25.06 kg/m². Intake/Output Summary (Last 24 hours) at 8/15/2023 1046  Last data filed at 8/15/2023 0846  Gross per 24 hour   Intake 720 ml   Output --   Net 720 ml       Invasive Devices     Peripheral Intravenous Line  Duration           Peripheral IV 08/13/23 Right Antecubital 1 day                Physical Exam:     Awake, oriented. No acute distress. Seems in good spirits. ENT clear. Sclera white. Pharynx noninflamed. Neck no increased JVP. Trachea midline. Chest good inspiratory effort. Heart RRR. No murmur heard. Lungs clear to auscultation. Back no flank tenderness. Appears straight. Abdomen positive bowel sounds, soft, no guarding, minimal tenderness to palpation left lower quadrant without peritoneal signs. No masses are felt. Inguinal sites are free of hernia. Extremities no without clubbing, no cyanosis or peripheral edema. Ambulation not witnessed. Neurological exam CNS grossly intact. Mental status appropriate. Speech clear. No tremor. Lab, Imaging and other studies:  I have personally reviewed pertinent lab results.   , CBC:   Lab Results   Component Value Date    WBC 6.44 08/15/2023    HGB 11.7 08/15/2023    HCT 36.6 08/15/2023    MCV 90 08/15/2023     08/15/2023    RBC 4.07 08/15/2023    MCH 28.7 08/15/2023    MCHC 32.0 08/15/2023    RDW 13.5 08/15/2023    MPV 10.7 08/15/2023    NRBC 0 08/15/2023   , CMP:   Lab Results   Component Value Date    SODIUM 142 08/15/2023    K 3.5 08/15/2023     08/15/2023    CO2 30 08/15/2023    BUN 17 08/15/2023    CREATININE 1.30 08/15/2023    CALCIUM 8.5 08/15/2023    AST 8 (L) 08/15/2023    ALT 4 (L) 08/15/2023    ALKPHOS 45 08/15/2023    EGFR 38 08/15/2023 VTE Pharmacologic Prophylaxis: Heparin  VTE Mechanical Prophylaxis: sequential compression device     Brandin Akbar PA-C

## 2023-08-16 VITALS
HEIGHT: 67 IN | DIASTOLIC BLOOD PRESSURE: 67 MMHG | TEMPERATURE: 97.9 F | SYSTOLIC BLOOD PRESSURE: 112 MMHG | WEIGHT: 160 LBS | HEART RATE: 46 BPM | RESPIRATION RATE: 18 BRPM | BODY MASS INDEX: 25.11 KG/M2 | OXYGEN SATURATION: 96 %

## 2023-08-16 PROBLEM — E87.6 HYPOKALEMIA: Status: RESOLVED | Noted: 2023-08-13 | Resolved: 2023-08-16

## 2023-08-16 PROBLEM — R16.0 LIVER MASS: Status: RESOLVED | Noted: 2023-08-14 | Resolved: 2023-08-16

## 2023-08-16 PROBLEM — F41.9 ANXIETY: Status: ACTIVE | Noted: 2023-08-16

## 2023-08-16 LAB
ANION GAP SERPL CALCULATED.3IONS-SCNC: 8 MMOL/L
BASOPHILS # BLD AUTO: 0.02 THOUSANDS/ÂΜL (ref 0–0.1)
BASOPHILS NFR BLD AUTO: 0 % (ref 0–1)
BUN SERPL-MCNC: 10 MG/DL (ref 5–25)
CALCIUM SERPL-MCNC: 8.5 MG/DL (ref 8.4–10.2)
CHLORIDE SERPL-SCNC: 108 MMOL/L (ref 96–108)
CO2 SERPL-SCNC: 27 MMOL/L (ref 21–32)
CREAT SERPL-MCNC: 1.15 MG/DL (ref 0.6–1.3)
EOSINOPHIL # BLD AUTO: 0.11 THOUSAND/ÂΜL (ref 0–0.61)
EOSINOPHIL NFR BLD AUTO: 2 % (ref 0–6)
ERYTHROCYTE [DISTWIDTH] IN BLOOD BY AUTOMATED COUNT: 13.4 % (ref 11.6–15.1)
GFR SERPL CREATININE-BSD FRML MDRD: 44 ML/MIN/1.73SQ M
GLUCOSE SERPL-MCNC: 109 MG/DL (ref 65–140)
GLUCOSE SERPL-MCNC: 84 MG/DL (ref 65–140)
GLUCOSE SERPL-MCNC: 95 MG/DL (ref 65–140)
HCT VFR BLD AUTO: 36.1 % (ref 34.8–46.1)
HGB BLD-MCNC: 11.5 G/DL (ref 11.5–15.4)
IMM GRANULOCYTES # BLD AUTO: 0.03 THOUSAND/UL (ref 0–0.2)
IMM GRANULOCYTES NFR BLD AUTO: 1 % (ref 0–2)
LYMPHOCYTES # BLD AUTO: 0.83 THOUSANDS/ÂΜL (ref 0.6–4.47)
LYMPHOCYTES NFR BLD AUTO: 15 % (ref 14–44)
MCH RBC QN AUTO: 28.8 PG (ref 26.8–34.3)
MCHC RBC AUTO-ENTMCNC: 31.9 G/DL (ref 31.4–37.4)
MCV RBC AUTO: 90 FL (ref 82–98)
MONOCYTES # BLD AUTO: 0.41 THOUSAND/ÂΜL (ref 0.17–1.22)
MONOCYTES NFR BLD AUTO: 7 % (ref 4–12)
NEUTROPHILS # BLD AUTO: 4.29 THOUSANDS/ÂΜL (ref 1.85–7.62)
NEUTS SEG NFR BLD AUTO: 75 % (ref 43–75)
NRBC BLD AUTO-RTO: 0 /100 WBCS
PLATELET # BLD AUTO: 237 THOUSANDS/UL (ref 149–390)
PMV BLD AUTO: 10.1 FL (ref 8.9–12.7)
POTASSIUM SERPL-SCNC: 3.7 MMOL/L (ref 3.5–5.3)
RBC # BLD AUTO: 4 MILLION/UL (ref 3.81–5.12)
SODIUM SERPL-SCNC: 143 MMOL/L (ref 135–147)
WBC # BLD AUTO: 5.69 THOUSAND/UL (ref 4.31–10.16)

## 2023-08-16 PROCEDURE — 85025 COMPLETE CBC W/AUTO DIFF WBC: CPT

## 2023-08-16 PROCEDURE — 82948 REAGENT STRIP/BLOOD GLUCOSE: CPT

## 2023-08-16 PROCEDURE — 99239 HOSP IP/OBS DSCHRG MGMT >30: CPT

## 2023-08-16 PROCEDURE — 80048 BASIC METABOLIC PNL TOTAL CA: CPT

## 2023-08-16 PROCEDURE — 99231 SBSQ HOSP IP/OBS SF/LOW 25: CPT | Performed by: PHYSICIAN ASSISTANT

## 2023-08-16 RX ORDER — AMOXICILLIN AND CLAVULANATE POTASSIUM 875; 125 MG/1; MG/1
1 TABLET, FILM COATED ORAL 3 TIMES DAILY
Qty: 36 TABLET | Refills: 0 | Status: SHIPPED | OUTPATIENT
Start: 2023-08-16 | End: 2023-09-06

## 2023-08-16 RX ADMIN — METRONIDAZOLE 500 MG: 500 INJECTION, SOLUTION INTRAVENOUS at 09:37

## 2023-08-16 RX ADMIN — ASPIRIN 81 MG: 81 TABLET, COATED ORAL at 09:37

## 2023-08-16 RX ADMIN — HEPARIN SODIUM 5000 UNITS: 5000 INJECTION INTRAVENOUS; SUBCUTANEOUS at 06:04

## 2023-08-16 RX ADMIN — LEVOTHYROXINE SODIUM 88 MCG: 88 TABLET ORAL at 06:04

## 2023-08-16 RX ADMIN — METRONIDAZOLE 500 MG: 500 INJECTION, SOLUTION INTRAVENOUS at 02:06

## 2023-08-16 RX ADMIN — ONDANSETRON 4 MG: 2 INJECTION INTRAMUSCULAR; INTRAVENOUS at 02:05

## 2023-08-16 RX ADMIN — DOCUSATE SODIUM 100 MG: 100 CAPSULE, LIQUID FILLED ORAL at 09:37

## 2023-08-16 RX ADMIN — ONDANSETRON 4 MG: 2 INJECTION INTRAMUSCULAR; INTRAVENOUS at 09:38

## 2023-08-16 NOTE — DISCHARGE SUMMARY
[address]  Discharge- Akilah Dunbar 1940, 79 y.o. female MRN: [id number]  Unit/Bed#: 402-01 Encounter: [id number]  Primary Care Provider: No primary care provider on file. Date and time admitted to hospital: 8/13/2023  3:07 PM    * Diverticulitis  Assessment & Plan  Hx recurrent diverticulitis. Failed oral Cipro/Flagyl, then Augmentin/Flagyl - notes GI intolerance to Flagy  · CT abdomen 08/13/23 revealing jejunal diverticulitis   · Remains afebrile, without leukocytosis. clinically improved   · S/p IV Rocephin/Flagyl - discharge with Augmentin to complete 14 day abx course  · Will need colonoscopy in 6-8 weeks - has GI with [facility] that she will follow-up with   · Low residue diet  · Appreciate general surgery consult  · Outpatient referral placed    KAYLA (acute kidney injury) [facility])  Assessment & Plan  Recent Labs     08/14/23  0517 08/15/23  0422 08/16/23  0615   CREATININE 1.52* 1.30 1.15   EGFR 31 38 44     Estimated Creatinine Clearance: 36 mL/min (by C-G formula based on SCr of 1.15 mg/dL). · Baseline around 0.9.    · KAYLA secondary to dehydration due to poor PO intake  · Resolved  · HCTZ discontinued at [facility]      Hypertension  Assessment & Plan  · BP has been at goal with all antihypertensives held  · Continue to hold at discharge  · Instructed patient to take daily BP at home and follow-up with PCP regarding potential need for resumption of medications    Anxiety  Assessment & Plan  · Managed on p.r.n Ativan  · D/w patient various tx options, recommended low-dose SSRI  · To follow-up with PCP regarding initiation and monitoring of medication    Bradycardia  Assessment & Plan  · Asymptomatic sinus bradycardia POA  · Metoprolol discontinued  · Outpatient PCP/cardiology follow-up  · Sleep study referral    Hypothyroidism  Assessment & Plan  · Continue home levothyroxine 88 mcg daily      Type 2 diabetes mellitus [facility])  Assessment & Plan  Lab Results   Component Value Date HGBA1C 6.0 (H) 07/26/2023       Recent Labs     08/15/23  0719 08/15/23  1058 08/15/23  1550 08/16/23  0722   POCGLU 85 108 104 84       Blood Sugar Average: Last 72 hrs:  · Previously on Metformin 750 mg daily, discontinued by her PCP during last visit on 08/01/2023. Hypokalemia-resolved as of 8/16/2023  Assessment & Plan  · Repleted  · Monitor & replete p.r.n      Medical Problems     Resolved Problems  Date Reviewed: 8/15/2023          Resolved    Hypokalemia 8/16/2023     Resolved by  Gertrudis Milian PA-C    Hypomagnesemia 8/15/2023     Resolved by  Gertrudis Milian PA-C        Discharging Physician / Practitioner: Gertrudis Milian PA-C  PCP: No primary care provider on file. Admission Date:   Admission Orders (From admission, onward)     Ordered        08/13/23 1715  Inpatient Admission  Once                      Discharge Date: 08/16/23    Consultations During Hospital Stay:  · General surgery     Procedures Performed:   · None     Significant Findings / Test Results:   · CT abd/pelvis: There is colonic diverticulosis. Interval decrease in the amount of fluid and fat stranding in the left lower quadrant. .There is interval development of inflammatory changes surrounding a jejunal diverticulum in the left lower quadrant which is filled with debris and demonstrates a thick wall concerning for jejunal diverticulitis, 601:59. . A second enlarged jejunal diverticula which does not demonstrate inflammatory changes is seen on 601:57.  · Cr 1.52>1.15    Incidental Findings:   · CT abdomen& pelvis 08/13/2023: KIDNEYS/URETERS: One or more simple renal cyst(s) is noted.  Otherwise unremarkable kidneys.  No hydronephrosis. · LIVER/BILIARY TREE: Previously seen 5 x 4 ovoid partially calcified mass at the hepatic dome appears similar to the prior exams from this institution and similar to description from prior outside report; images not available.   · 08/05/23: ADRENAL GLANDS: Right adrenal gland is normal. Lobulated very low density left adrenal lesion measuring 6 cm and occupying the majority of the superior and lateral limb--not significantly changed from 2022    Test Results Pending at Discharge (will require follow up): · None     Outpatient Tests Requested:  · Outpatient general surgery follow-up  · Colonoscopy in 6-8 weeks    Complications:  none    Reason for Admission: diverticulitis     Hospital Course:   Faisal Albert is a 79 y.o. female patient who originally presented to the hospital on 8/13/2023 due to left lower quadrant pain found with acute diverticulitis. Patient was admitted and started on IV antibiotics with Rocephin & Flagyl. General surgery was consulted. Throughout course, patient pain improved and she was able to tolerate full diet without issue. Given intolerance to oral Flagyl, it was agreed upon with general surgery for discharge with Augmentin only to complete 14 day course of antibiotics. Also given referral to general surgery to discuss potential elective procedure. Patient notes having GI physician with [facility] that she will follow-up with regarding repeat colonoscopy. Education given regarding low residue diet. Patient initially with KAYLA that resolved with IVF hydration. Her BP medications were held given KAYLA and bradycardia. Her blood pressure remained at goal without medications - thus was instructed to continue to hold medications at discharge. She was also provided with cardiology and referral for sleep study with asymptomatic bradycardia. She understands to monitor BP with cuff at home and follow-up with PCP regarding any potential resumption of therapy. Patient discharged home in stable condition. Please see above list of diagnoses and related plan for additional information. Condition at Discharge: stable    Discharge Day Visit / Exam:   Subjective:  Reports feeling well today. Denies abdominal pain. Tolerating advanced diet.  Passing gas & having small BMs. No acute concerns. Vitals: Blood Pressure: 112/67 (08/16/23 0722)  Pulse: (!) 46 (08/16/23 0722)  Temperature: 97.9 °F (36.6 °C) (08/16/23 0722)  Temp Source: Oral (08/15/23 2214)  Respirations: 18 (08/16/23 0722)  Height: 5' 7" (170.2 cm) (08/13/23 1510)  Weight - Scale: 72.6 kg (160 lb) (08/13/23 1510)  SpO2: 96 % (08/16/23 0722)  Exam:   Physical Exam  HENT:      Head: Normocephalic and atraumatic. Cardiovascular:      Rate and Rhythm: Regular rhythm. Bradycardia present. Pulses: Normal pulses. Heart sounds: Normal heart sounds. Pulmonary:      Effort: Pulmonary effort is normal. No respiratory distress. Breath sounds: Normal breath sounds. Abdominal:      General: Abdomen is flat. Bowel sounds are normal. There is no distension. Palpations: Abdomen is soft. Tenderness: There is no abdominal tenderness. Musculoskeletal:      Right lower leg: No edema. Left lower leg: No edema. Skin:     General: Skin is warm and dry. Neurological:      General: No focal deficit present. Mental Status: She is alert. Psychiatric:         Mood and Affect: Mood normal.          Discussion with Family: Updated  (daughter) via phone. Discharge instructions/Information to patient and family:   See after visit summary for information provided to patient and family. Provisions for Follow-Up Care:  See after visit summary for information related to follow-up care and any pertinent home health orders. Disposition:   Home    Planned Readmission: none     Discharge Statement:  I spent 35 minutes discharging the patient. This time was spent on the day of discharge. I had direct contact with the patient on the day of discharge. Greater than 50% of the total time was spent examining patient, answering all patient questions, arranging and discussing plan of care with patient as well as directly providing post-discharge instructions.   Additional time then spent on discharge activities. Discharge Medications:  See after visit summary for reconciled discharge medications provided to patient and/or family.       **Please Note: This note may have been constructed using a voice recognition system**

## 2023-08-16 NOTE — ASSESSMENT & PLAN NOTE
· Managed on p.r.n Ativan  · D/w patient various tx options, recommended low-dose SSRI  · To follow-up with PCP regarding initiation and monitoring of medication

## 2023-08-16 NOTE — ASSESSMENT & PLAN NOTE
· BP has been at goal with all antihypertensives held  · Continue to hold at discharge  · Instructed patient to take daily BP at home and follow-up with PCP regarding potential need for resumption of medications

## 2023-08-16 NOTE — ASSESSMENT & PLAN NOTE
· Asymptomatic sinus bradycardia POA  · Metoprolol discontinued  · Outpatient PCP/cardiology follow-up  · Sleep study referral

## 2023-08-16 NOTE — DISCHARGE INSTR - AVS FIRST PAGE
DIVERTICULITIS EDUCATION:  After you are discharged from the hospital:  Please stick to a low fiber/low residue diet for the next 2 weeks as you recover. Transition and maintain a high-fiber/residue diet for prevention of further progression of diverticulosis formation/new diverticulitis episodes. **See detailed diet information below. **  You can continue to take over the counter pain medication as needed. NSAIDs (ibuprofen, Advil, Motrin) work best as they help with inflammation and this is an inflammatory disease. You can take 600 mg ibuprofen every 6 hours. Take with food. Take the full course of your antibiotic. Take with food. Follow up with a gastroenterologist or general surgery for a colonoscopy if you have not had one within the past year. This should be done 6-8 weeks after your acute episode of diverticulitis once the inflammation has resolved. Follow up with your primary care provider in the next 1-2 weeks to discuss your hospitalization and see how you are doing if you had any additional problems while in the hospital.  Return to the ED if you begin to develop: fever/chills >100.4, severe worsening abdominal pain, N/V, signs of dehydration, blood in the stool, not passing stool or gas, or any other significant changes to your overall health. Diverticulitis prevention: Overall, live a healthy lifestyle.    Exercise regularly, stay active--exercise promotes bowel function/regular bowel movements  Weight loss--obesity is a risk factor for diverticulitis   Eat a diet rich in high-fiber foods (green leafy vegetables/fruits/whole grains/beans) or take a fiber supplement as needed (such as Psyllium/Metamucil)--Fiber helps to soften/bulk up the stool to  make bowel movements, helping to prevent straining/constipation   It is okay to eat nuts/seeds/corn, this is a medical myth and studies show that consuming these foods does not associated with cause diverticulitis  Drink plenty of fluids (as able--do not over-consume if on fluid restriction for heart/kidneys)--Fiber works by [employer] so it is important to be well hydrated  Smoking cessation (cigarettes and vape)      Low fiber/Low residue Diet:  Why do I need to eat this diet? This diet reduces the size and frequency of stool, allowing for less pressure against the bowels, giving them a chance to rest.  Fiber adds bulk to your stool, makes you feel full, and promotes regular bowel movements. This is appropriate and actually preferred as part of a nutritious diet, but after certain surgeries/medical issues, it may be better for you to eat low-fiber/low-residue for a few weeks.    After bowel surgery or inflammatory conditions affecting the bowels, this diet is recommended to allow your intestines for some time to heal.

## 2023-08-16 NOTE — CASE MANAGEMENT
Case Management Discharge Planning Note    Patient name Uziel Party  Location /636-38 MRN [id number]  : 1940 Date 2023       Current Admission Date: 2023  Current Admission Diagnosis:Diverticulitis   Patient Active Problem List    Diagnosis Date Noted   • Anxiety 2023   • Adrenal adenoma, left 2023   • Diverticulitis 2023   • Type 2 diabetes mellitus ([address] ) 2023   • H/O TIA (transient ischemic attack) and stroke 2023   • KAYLA (acute kidney injury) ([address]) 2023   • Hypothyroidism 2023   • Hypertension 2023   • Bradycardia 2023      LOS (days): 3  Geometric Mean LOS (GMLOS) (days): 3.10  Days to GMLOS:0.3     OBJECTIVE:  Risk of Unplanned Readmission Score: 13.55         Current admission status: Inpatient   Preferred Pharmacy:   [address] [place] [address]  Phone: [phone] Fax: [phone]    [facility] [place] [address]  Phone: [phone] Fax: [phone]    Primary Care Provider: No primary care provider on file. Primary Insurance: MEDICARE  Secondary Insurance: AET[employer]    DISCHARGE DETAILS:  Pt is being dc'd home on this date.    Discharge planning discussed with[de-identified] patient       Requested [address]         Is the patient interested in [facility] at discharge?: No    DME Referral Provided  Referral made for DME?: No         Would you like to participate in our [id number] [employer] service program?  : No - Declined       Discharge Destination Plan[de-identified] Home  Transport at Discharge : Family (daughter)

## 2023-08-16 NOTE — PROGRESS NOTES
Progress Note - General Surgery   Tara Dupree 79 y.o. female MRN: [id number]  Unit/Bed#:  Encounter: [id number]    ASSESSMENT:  80 y/o F with hx of recurrent diverticulitis presents HD#3 w/ jejunal diverticulitis  (PMH: HTN, hypothyroidism, h/o TIA/CVA, DM2-last HA1C 6)  -AF, VSS, mild bradycardia asymptomatic   -Elytes/hgb stable, no leukocytosis  -KAYLA POA, Cr resolved. Baseline Cr around 0.9. Trend: 1.15, 1.30, 1.52, 1.77  -Blood cx 8/13 NGTD  -Urine cx  8/13 NGTD  -Day 3 of IV Rocephin/Flagyl    PLAN:  -Resolving with non-surgical medical management with IV abx.   -Transition to PO abx upon d/c (Augmentin for a total 14 day course of abx)  -Continue low residue/fiber diet x 2 weeks, then transition and maintain high fiber diet. Diet d/w pt and info placed in AVS. Encouraged regular physical activity and hydration. Recommended smoking cessation. Continue home bowel regimen to avoid chronic constipation.   -Last c-scope was 2 years ago. Recommend outpatient follow up with GI for a colonoscopy in 6-8 weeks.   -Analgesia PRN   -DVT ppx while inpatient: SQ heparin, SCDS, ambulation  -PO home meds, nicotine patch  -D/C per primary team. Stable from a surgical standpoint.   -Discussed plan with pt and her daughter over speaker phone. Will discuss with [facility] potential of starting a maintenance anxiety medication. -D/w Dr. Edison Bonilla and [facility]    SUBJECTIVE:  No acute events overnight. Denies abdominal pain. Baseline nausea with flagyl, not worse, no V. Tolerating diet. Denies CP, SOB, calf pain. Getting OOB, ambulating. Pt's daughter also on speaker phone. Says the pt has constant anxiety/worrying daily. Also had recent high stress event (passing of her ) which she thinks contributed to the diverticulitis flare. OBJECTIVE:   Vitals:  Blood pressure 112/67, pulse (!) 46, temperature 97.9 °F (36.6 °C), resp. rate 18, height 5' 7" (1.702 m), weight 72.6 kg (160 lb), SpO2 96 %. ,Body mass index is 25.06 kg/m².    I/Os:    Intake/Output Summary (Last 24 hours) at 8/16/2023 0758  Last data filed at 8/15/2023 1706  Gross per 24 hour   Intake 540 ml   Output --   Net 540 ml       Lines/Drains:  Invasive Devices     Peripheral Intravenous Line  Duration           Peripheral IV 08/13/23 Right Antecubital 2 days                Physical Exam  Vitals reviewed. Constitutional:       General: She is not in acute distress. Appearance: She is not toxic-appearing. HENT:      Head: Normocephalic and atraumatic. Cardiovascular:      Rate and Rhythm: Regular rhythm. Bradycardia present. Heart sounds: No murmur heard. Pulmonary:      Effort: Pulmonary effort is normal.   Abdominal:      General: Bowel sounds are normal. There is no distension. Palpations: Abdomen is soft. Tenderness: There is abdominal tenderness (mild with deep palpation LLQ). There is no guarding or rebound. Musculoskeletal:         General: No tenderness. Cervical back: Neck supple. Right lower leg: No edema. Left lower leg: No edema. Skin:     General: Skin is warm and dry. Neurological:      General: No focal deficit present. Mental Status: She is alert. Diagnostics:  I have personally reviewed pertinent lab results. CT abdomen pelvis wo contrast    Result Date: 8/13/2023  Impression: There is colonic diverticulosis. Interval decrease in the amount of fluid and fat stranding in the left lower quadrant. . There is interval development of inflammatory changes surrounding a jejunal diverticulum in the left lower quadrant which is filled with debris and demonstrates a thick wall concerning for jejunal diverticulitis, 601:59. . A second enlarged jejunal diverticula which does not demonstrate inflammatory changes is seen on 601:57. The study was marked in [facility] [facility] for immediate notification.  Workstation performed: [id number]     Recent Results (from the past 36 hour(s))   Fingerstick Glucose (POCT) Collection Time: 08/14/23  8:38 PM   Result Value Ref Range    POC Glucose 108 65 - 140 mg/dl   Magnesium    Collection Time: 08/15/23  4:22 AM   Result Value Ref Range    Magnesium 1.9 1.9 - 2.7 mg/dL   AFP tumor marker    Collection Time: 08/15/23  4:22 AM   Result Value Ref Range    AFP TUMOR MARKER 4.42 0.00 - 9.00 ng/mL   Comprehensive metabolic panel    Collection Time: 08/15/23  4:22 AM   Result Value Ref Range    Sodium 142 135 - 147 mmol/L    Potassium 3.5 3.5 - 5.3 mmol/L    Chloride 105 96 - 108 mmol/L    CO2 30 21 - 32 mmol/L    ANION GAP 7 mmol/L    BUN 17 5 - 25 mg/dL    Creatinine 1.30 0.60 - 1.30 mg/dL    Glucose 88 65 - 140 mg/dL    Calcium 8.5 8.4 - 10.2 mg/dL    Corrected Calcium 9.3 8.3 - 10.1 mg/dL    AST 8 (L) 13 - 39 U/L    ALT 4 (L) 7 - 52 U/L    Alkaline Phosphatase 45 34 - 104 U/L    Total Protein 5.5 (L) 6.4 - 8.4 g/dL    Albumin 3.0 (L) 3.5 - 5.0 g/dL    Total Bilirubin 0.33 0.20 - 1.00 mg/dL    eGFR 38 ml/min/1.73sq m   Phosphorus    Collection Time: 08/15/23  4:22 AM   Result Value Ref Range    Phosphorus 2.5 2.3 - 4.1 mg/dL   Procalcitonin    Collection Time: 08/15/23  4:22 AM   Result Value Ref Range    Procalcitonin 0.09 <=0.25 ng/ml   CBC and differential    Collection Time: 08/15/23  4:22 AM   Result Value Ref Range    WBC 6.44 4.31 - 10.16 Thousand/uL    RBC 4.07 3.81 - 5.12 Million/uL    Hemoglobin 11.7 11.5 - 15.4 g/dL    Hematocrit 36.6 34.8 - 46.1 %    MCV 90 82 - 98 fL    MCH 28.7 26.8 - 34.3 pg    MCHC 32.0 31.4 - 37.4 g/dL    RDW 13.5 11.6 - 15.1 %    MPV 10.7 8.9 - 12.7 fL    Platelets 025 221 - 516 Thousands/uL    nRBC 0 /100 WBCs    Neutrophils Relative 76 (H) 43 - 75 %    Immat GRANS % 0 0 - 2 %    Lymphocytes Relative 15 14 - 44 %    Monocytes Relative 7 4 - 12 %    Eosinophils Relative 2 0 - 6 %    Basophils Relative 0 0 - 1 %    Neutrophils Absolute 4.91 1.85 - 7.62 Thousands/µL    Immature Grans Absolute 0.02 0.00 - 0.20 Thousand/uL    Lymphocytes Absolute 0.95 0.60 - 4.47 Thousands/µL    Monocytes Absolute 0.42 0.17 - 1.22 Thousand/µL    Eosinophils Absolute 0.12 0.00 - 0.61 Thousand/µL    Basophils Absolute 0.02 0.00 - 0.10 Thousands/µL   Fingerstick Glucose (POCT)    Collection Time: 08/15/23  7:19 AM   Result Value Ref Range    POC Glucose 85 65 - 140 mg/dl   Fingerstick Glucose (POCT)    Collection Time: 08/15/23 10:58 AM   Result Value Ref Range    POC Glucose 108 65 - 140 mg/dl   Fingerstick Glucose (POCT)    Collection Time: 08/15/23  3:50 PM   Result Value Ref Range    POC Glucose 104 65 - 140 mg/dl   Basic metabolic panel    Collection Time: 08/16/23  6:15 AM   Result Value Ref Range    Sodium 143 135 - 147 mmol/L    Potassium 3.7 3.5 - 5.3 mmol/L    Chloride 108 96 - 108 mmol/L    CO2 27 21 - 32 mmol/L    ANION GAP 8 mmol/L    BUN 10 5 - 25 mg/dL    Creatinine 1.15 0.60 - 1.30 mg/dL    Glucose 95 65 - 140 mg/dL    Calcium 8.5 8.4 - 10.2 mg/dL    eGFR 44 ml/min/1.73sq m   CBC and differential    Collection Time: 08/16/23  6:15 AM   Result Value Ref Range    WBC 5.69 4.31 - 10.16 Thousand/uL    RBC 4.00 3.81 - 5.12 Million/uL    Hemoglobin 11.5 11.5 - 15.4 g/dL    Hematocrit 36.1 34.8 - 46.1 %    MCV 90 82 - 98 fL    MCH 28.8 26.8 - 34.3 pg    MCHC 31.9 31.4 - 37.4 g/dL    RDW 13.4 11.6 - 15.1 %    MPV 10.1 8.9 - 12.7 fL    Platelets 354 246 - 250 Thousands/uL    nRBC 0 /100 WBCs    Neutrophils Relative 75 43 - 75 %    Immat GRANS % 1 0 - 2 %    Lymphocytes Relative 15 14 - 44 %    Monocytes Relative 7 4 - 12 %    Eosinophils Relative 2 0 - 6 %    Basophils Relative 0 0 - 1 %    Neutrophils Absolute 4.29 1.85 - 7.62 Thousands/µL    Immature Grans Absolute 0.03 0.00 - 0.20 Thousand/uL    Lymphocytes Absolute 0.83 0.60 - 4.47 Thousands/µL    Monocytes Absolute 0.41 0.17 - 1.22 Thousand/µL    Eosinophils Absolute 0.11 0.00 - 0.61 Thousand/µL    Basophils Absolute 0.02 0.00 - 0.10 Thousands/µL   Fingerstick Glucose (POCT)    Collection Time: 08/16/23  7:22 AM   Result Value Ref Range    POC Glucose 84 65 - 140 mg/dl       Current Medications:  Scheduled Meds:  Current Facility-Administered Medications   Medication Dose Route Frequency Provider Last Rate   • acetaminophen  650 mg Oral Q6H PRN Alejandro Dixon MD     • ALPRAZolam  0.25 mg Oral BID PRN Alejandro Dixon MD     • aspirin  81 mg Oral Daily Alejandro Dixon MD     • calcium carbonate  1,000 mg Oral Daily PRN Alejandro Dixon MD     • cefTRIAXone  2,000 mg Intravenous Q24H Alejandro Dixon MD 2,000 mg (08/15/23 1538)   • docusate sodium  100 mg Oral BID Alejandro Dixon MD     • heparin (porcine)  5,000 Units Subcutaneous Q8H [facility] Alejandro Dixon MD     • insulin lispro  1-5 Units Subcutaneous TID AC Alejandro Dixon MD     • levothyroxine  88 mcg Oral Early Morning Alejandro Dixon MD     • metroNIDAZOLE  500 mg Intravenous Q8H Alejandro Dixon  mg (08/16/23 0206)   • nicotine  1 patch Transdermal Daily Alejandro Dixon MD     • ondansetron  4 mg Intravenous Q6H PRN Alejandro Dixon MD     • oxyCODONE  5 mg Oral Q6H PRN Derek Aldana DO     • pravastatin  80 mg Oral Daily With Dinner Alejandro Dixon MD     • sodium chloride  100 mL/hr Intravenous Continuous Derek Aldana  mL/hr (08/15/23 2259)     Continuous Infusions:sodium chloride, 100 mL/hr, Last Rate: 100 mL/hr (08/15/23 2259)      PRN Meds:  •  acetaminophen  •  ALPRAZolam  •  calcium carbonate  •  ondansetron  •  oxyCODONE      JEEVAN Juan  8/16/2023

## 2023-08-16 NOTE — ASSESSMENT & PLAN NOTE
Hx recurrent diverticulitis. Failed oral Cipro/Flagyl, then Augmentin/Flagyl - notes GI intolerance to Flagy  · CT abdomen 08/13/23 revealing jejunal diverticulitis   · Remains afebrile, without leukocytosis.  clinically improved   · S/p IV Rocephin/Flagyl - discharge with Augmentin to complete 14 day abx course  · Will need colonoscopy in 6-8 weeks - has GI with [facility] that she will follow-up with   · Low residue diet  · Appreciate general surgery consult  · Outpatient referral placed

## 2023-08-16 NOTE — NURSING NOTE
Reviewed AVS with pt. Pt in stable condition. PCA transported down to ground floor to meet her daughter.

## 2023-08-16 NOTE — ASSESSMENT & PLAN NOTE
Recent Labs     08/14/23  0517 08/15/23  0422 08/16/23  0615   CREATININE 1.52* 1.30 1.15   EGFR 31 38 44     Estimated Creatinine Clearance: 36 mL/min (by C-G formula based on SCr of 1.15 mg/dL). · Baseline around 0.9.    · KAYLA secondary to dehydration due to poor PO intake  · Resolved  · HCTZ discontinued at [facility]

## 2023-08-16 NOTE — ASSESSMENT & PLAN NOTE
Lab Results   Component Value Date    HGBA1C 6.0 (H) 07/26/2023       Recent Labs     08/15/23  0719 08/15/23  1058 08/15/23  1550 08/16/23  0722   POCGLU 85 108 104 84       Blood Sugar Average: Last 72 hrs:  · Previously on Metformin 750 mg daily, discontinued by her PCP during last visit on 08/01/2023.

## 2023-08-19 LAB
BACTERIA BLD CULT: NORMAL
BACTERIA BLD CULT: NORMAL

## 2023-08-25 ENCOUNTER — HOSPITAL ENCOUNTER (INPATIENT)
Facility: HOSPITAL | Age: 83
LOS: 11 days | Discharge: HOME HEALTH | DRG: 392 | End: 2023-09-06
Attending: EMERGENCY MEDICINE | Admitting: INTERNAL MEDICINE
Payer: MEDICARE

## 2023-08-25 ENCOUNTER — APPOINTMENT (EMERGENCY)
Dept: CT IMAGING | Facility: HOSPITAL | Age: 83
DRG: 392 | End: 2023-08-25
Payer: MEDICARE

## 2023-08-25 DIAGNOSIS — E83.42 HYPOMAGNESEMIA: ICD-10-CM

## 2023-08-25 DIAGNOSIS — K57.32 DIVERTICULITIS OF SIGMOID COLON: Primary | ICD-10-CM

## 2023-08-25 DIAGNOSIS — A04.72 C. DIFFICILE COLITIS: ICD-10-CM

## 2023-08-25 PROBLEM — E78.49 OTHER HYPERLIPIDEMIA: Status: ACTIVE | Noted: 2023-08-25

## 2023-08-25 LAB
ALBUMIN SERPL BCP-MCNC: 3.9 G/DL (ref 3.5–5)
ALP SERPL-CCNC: 68 U/L (ref 34–104)
ALT SERPL W P-5'-P-CCNC: 10 U/L (ref 7–52)
ANION GAP SERPL CALCULATED.3IONS-SCNC: 11 MMOL/L
AST SERPL W P-5'-P-CCNC: 14 U/L (ref 13–39)
BASOPHILS # BLD AUTO: 0.03 THOUSANDS/ÂΜL (ref 0–0.1)
BASOPHILS NFR BLD AUTO: 0 % (ref 0–1)
BILIRUB SERPL-MCNC: 0.89 MG/DL (ref 0.2–1)
BILIRUB UR QL STRIP: NEGATIVE
BUN SERPL-MCNC: 11 MG/DL (ref 5–25)
CALCIUM SERPL-MCNC: 9.4 MG/DL (ref 8.4–10.2)
CHLORIDE SERPL-SCNC: 98 MMOL/L (ref 96–108)
CLARITY UR: CLEAR
CO2 SERPL-SCNC: 26 MMOL/L (ref 21–32)
COLOR UR: NORMAL
CREAT SERPL-MCNC: 0.81 MG/DL (ref 0.6–1.3)
EOSINOPHIL # BLD AUTO: 0.03 THOUSAND/ÂΜL (ref 0–0.61)
EOSINOPHIL NFR BLD AUTO: 0 % (ref 0–6)
ERYTHROCYTE [DISTWIDTH] IN BLOOD BY AUTOMATED COUNT: 14.3 % (ref 11.6–15.1)
GFR SERPL CREATININE-BSD FRML MDRD: 67 ML/MIN/1.73SQ M
GLUCOSE SERPL-MCNC: 128 MG/DL (ref 65–140)
GLUCOSE UR STRIP-MCNC: NEGATIVE MG/DL
HCT VFR BLD AUTO: 44.4 % (ref 34.8–46.1)
HGB BLD-MCNC: 14.4 G/DL (ref 11.5–15.4)
HGB UR QL STRIP.AUTO: NEGATIVE
IMM GRANULOCYTES # BLD AUTO: 0.05 THOUSAND/UL (ref 0–0.2)
IMM GRANULOCYTES NFR BLD AUTO: 0 % (ref 0–2)
KETONES UR STRIP-MCNC: NEGATIVE MG/DL
LACTATE SERPL-SCNC: 0.8 MMOL/L (ref 0.5–2)
LEUKOCYTE ESTERASE UR QL STRIP: NEGATIVE
LIPASE SERPL-CCNC: 9 U/L (ref 11–82)
LYMPHOCYTES # BLD AUTO: 0.77 THOUSANDS/ÂΜL (ref 0.6–4.47)
LYMPHOCYTES NFR BLD AUTO: 5 % (ref 14–44)
MAGNESIUM SERPL-MCNC: 1.5 MG/DL (ref 1.9–2.7)
MCH RBC QN AUTO: 28.7 PG (ref 26.8–34.3)
MCHC RBC AUTO-ENTMCNC: 32.4 G/DL (ref 31.4–37.4)
MCV RBC AUTO: 88 FL (ref 82–98)
MONOCYTES # BLD AUTO: 0.86 THOUSAND/ÂΜL (ref 0.17–1.22)
MONOCYTES NFR BLD AUTO: 5 % (ref 4–12)
NEUTROPHILS # BLD AUTO: 14.95 THOUSANDS/ÂΜL (ref 1.85–7.62)
NEUTS SEG NFR BLD AUTO: 90 % (ref 43–75)
NITRITE UR QL STRIP: NEGATIVE
NRBC BLD AUTO-RTO: 0 /100 WBCS
PH UR STRIP.AUTO: 6 [PH]
PLATELET # BLD AUTO: 340 THOUSANDS/UL (ref 149–390)
PMV BLD AUTO: 9.9 FL (ref 8.9–12.7)
POTASSIUM SERPL-SCNC: 3.5 MMOL/L (ref 3.5–5.3)
PROT SERPL-MCNC: 7 G/DL (ref 6.4–8.4)
PROT UR STRIP-MCNC: NEGATIVE MG/DL
RBC # BLD AUTO: 5.02 MILLION/UL (ref 3.81–5.12)
SODIUM SERPL-SCNC: 135 MMOL/L (ref 135–147)
SP GR UR STRIP.AUTO: <=1.005 (ref 1–1.03)
UROBILINOGEN UR QL STRIP.AUTO: 0.2 E.U./DL
WBC # BLD AUTO: 16.69 THOUSAND/UL (ref 4.31–10.16)

## 2023-08-25 PROCEDURE — 36415 COLL VENOUS BLD VENIPUNCTURE: CPT | Performed by: EMERGENCY MEDICINE

## 2023-08-25 PROCEDURE — 83605 ASSAY OF LACTIC ACID: CPT | Performed by: EMERGENCY MEDICINE

## 2023-08-25 PROCEDURE — 83735 ASSAY OF MAGNESIUM: CPT | Performed by: EMERGENCY MEDICINE

## 2023-08-25 PROCEDURE — 85025 COMPLETE CBC W/AUTO DIFF WBC: CPT | Performed by: EMERGENCY MEDICINE

## 2023-08-25 PROCEDURE — 96367 TX/PROPH/DG ADDL SEQ IV INF: CPT

## 2023-08-25 PROCEDURE — 99285 EMERGENCY DEPT VISIT HI MDM: CPT | Performed by: EMERGENCY MEDICINE

## 2023-08-25 PROCEDURE — G1004 CDSM NDSC: HCPCS

## 2023-08-25 PROCEDURE — 83690 ASSAY OF LIPASE: CPT | Performed by: EMERGENCY MEDICINE

## 2023-08-25 PROCEDURE — 87493 C DIFF AMPLIFIED PROBE: CPT | Performed by: PHYSICIAN ASSISTANT

## 2023-08-25 PROCEDURE — 96375 TX/PRO/DX INJ NEW DRUG ADDON: CPT

## 2023-08-25 PROCEDURE — 96365 THER/PROPH/DIAG IV INF INIT: CPT

## 2023-08-25 PROCEDURE — 89055 LEUKOCYTE ASSESSMENT FECAL: CPT | Performed by: PHYSICIAN ASSISTANT

## 2023-08-25 PROCEDURE — 80053 COMPREHEN METABOLIC PANEL: CPT | Performed by: EMERGENCY MEDICINE

## 2023-08-25 PROCEDURE — 81003 URINALYSIS AUTO W/O SCOPE: CPT | Performed by: EMERGENCY MEDICINE

## 2023-08-25 PROCEDURE — 74176 CT ABD & PELVIS W/O CONTRAST: CPT

## 2023-08-25 PROCEDURE — 99284 EMERGENCY DEPT VISIT MOD MDM: CPT

## 2023-08-25 PROCEDURE — 99222 1ST HOSP IP/OBS MODERATE 55: CPT | Performed by: PHYSICIAN ASSISTANT

## 2023-08-25 RX ORDER — SODIUM CHLORIDE 9 MG/ML
75 INJECTION, SOLUTION INTRAVENOUS CONTINUOUS
Status: DISCONTINUED | OUTPATIENT
Start: 2023-08-25 | End: 2023-08-27

## 2023-08-25 RX ORDER — ALPRAZOLAM 0.25 MG/1
0.25 TABLET ORAL 2 TIMES DAILY PRN
Status: DISCONTINUED | OUTPATIENT
Start: 2023-08-25 | End: 2023-08-26

## 2023-08-25 RX ORDER — HYDROCHLOROTHIAZIDE 25 MG/1
25 TABLET ORAL DAILY
COMMUNITY

## 2023-08-25 RX ORDER — ACETAMINOPHEN 325 MG/1
650 TABLET ORAL EVERY 6 HOURS PRN
Status: DISCONTINUED | OUTPATIENT
Start: 2023-08-25 | End: 2023-09-06 | Stop reason: HOSPADM

## 2023-08-25 RX ORDER — ONDANSETRON 2 MG/ML
4 INJECTION INTRAMUSCULAR; INTRAVENOUS ONCE
Status: COMPLETED | OUTPATIENT
Start: 2023-08-25 | End: 2023-08-25

## 2023-08-25 RX ORDER — PRAVASTATIN SODIUM 40 MG
80 TABLET ORAL
Status: DISCONTINUED | OUTPATIENT
Start: 2023-08-26 | End: 2023-09-06 | Stop reason: HOSPADM

## 2023-08-25 RX ORDER — MAGNESIUM SULFATE HEPTAHYDRATE 40 MG/ML
2 INJECTION, SOLUTION INTRAVENOUS ONCE
Status: COMPLETED | OUTPATIENT
Start: 2023-08-25 | End: 2023-08-25

## 2023-08-25 RX ORDER — HEPARIN SODIUM 5000 [USP'U]/ML
5000 INJECTION, SOLUTION INTRAVENOUS; SUBCUTANEOUS EVERY 8 HOURS SCHEDULED
Status: DISCONTINUED | OUTPATIENT
Start: 2023-08-25 | End: 2023-09-06 | Stop reason: HOSPADM

## 2023-08-25 RX ORDER — ONDANSETRON 2 MG/ML
4 INJECTION INTRAMUSCULAR; INTRAVENOUS EVERY 6 HOURS PRN
Status: DISCONTINUED | OUTPATIENT
Start: 2023-08-25 | End: 2023-08-26

## 2023-08-25 RX ORDER — CEFTRIAXONE 1 G/50ML
1000 INJECTION, SOLUTION INTRAVENOUS EVERY 24 HOURS
Status: DISCONTINUED | OUTPATIENT
Start: 2023-08-26 | End: 2023-08-26

## 2023-08-25 RX ORDER — SODIUM CHLORIDE, SODIUM GLUCONATE, SODIUM ACETATE, POTASSIUM CHLORIDE, MAGNESIUM CHLORIDE, SODIUM PHOSPHATE, DIBASIC, AND POTASSIUM PHOSPHATE .53; .5; .37; .037; .03; .012; .00082 G/100ML; G/100ML; G/100ML; G/100ML; G/100ML; G/100ML; G/100ML
1000 INJECTION, SOLUTION INTRAVENOUS ONCE
Status: COMPLETED | OUTPATIENT
Start: 2023-08-25 | End: 2023-08-25

## 2023-08-25 RX ORDER — METRONIDAZOLE 500 MG/100ML
500 INJECTION, SOLUTION INTRAVENOUS ONCE
Status: COMPLETED | OUTPATIENT
Start: 2023-08-25 | End: 2023-08-25

## 2023-08-25 RX ORDER — METRONIDAZOLE 500 MG/100ML
500 INJECTION, SOLUTION INTRAVENOUS EVERY 8 HOURS
Status: DISCONTINUED | OUTPATIENT
Start: 2023-08-26 | End: 2023-08-26

## 2023-08-25 RX ORDER — CEFTRIAXONE 1 G/50ML
1000 INJECTION, SOLUTION INTRAVENOUS ONCE
Status: COMPLETED | OUTPATIENT
Start: 2023-08-25 | End: 2023-08-25

## 2023-08-25 RX ORDER — LEVOTHYROXINE SODIUM 88 UG/1
88 TABLET ORAL
Status: DISCONTINUED | OUTPATIENT
Start: 2023-08-26 | End: 2023-09-06 | Stop reason: HOSPADM

## 2023-08-25 RX ADMIN — ONDANSETRON 4 MG: 2 INJECTION INTRAMUSCULAR; INTRAVENOUS at 21:45

## 2023-08-25 RX ADMIN — ONDANSETRON 4 MG: 2 INJECTION INTRAMUSCULAR; INTRAVENOUS at 18:14

## 2023-08-25 RX ADMIN — SODIUM CHLORIDE 75 ML/HR: 0.9 INJECTION, SOLUTION INTRAVENOUS at 22:27

## 2023-08-25 RX ADMIN — MAGNESIUM SULFATE HEPTAHYDRATE 2 G: 40 INJECTION, SOLUTION INTRAVENOUS at 19:02

## 2023-08-25 RX ADMIN — CEFTRIAXONE 1000 MG: 1 INJECTION, SOLUTION INTRAVENOUS at 21:55

## 2023-08-25 RX ADMIN — METRONIDAZOLE 500 MG: 5 INJECTION, SOLUTION INTRAVENOUS at 22:27

## 2023-08-25 RX ADMIN — HEPARIN SODIUM 5000 UNITS: 5000 INJECTION INTRAVENOUS; SUBCUTANEOUS at 21:47

## 2023-08-25 RX ADMIN — ACETAMINOPHEN 650 MG: 325 TABLET, FILM COATED ORAL at 21:44

## 2023-08-25 RX ADMIN — SODIUM CHLORIDE, SODIUM GLUCONATE, SODIUM ACETATE, POTASSIUM CHLORIDE, MAGNESIUM CHLORIDE, SODIUM PHOSPHATE, DIBASIC, AND POTASSIUM PHOSPHATE 1000 ML: .53; .5; .37; .037; .03; .012; .00082 INJECTION, SOLUTION INTRAVENOUS at 18:14

## 2023-08-25 NOTE — ED PROVIDER NOTES
History  Chief Complaint   Patient presents with   • Abdominal Pain     Lower abdominal pain, recently dx diverticulitis. States fever this morning, irregular BM, going more often but less output. Sent in by PCP for repeat CT. This is an 26-year-old woman with noted past medical hx who presents to the emergency department because of LLQ and suprapubic cramping abdominal pain since this morning with loose/nonbloody stool occurring frequently since her hospital discharge on 16 August 2023. She had been hospitalized at this facility from 13-16 August 2023 for jejunal diverticulitis; had been seen in this ED on 5 August 2023 and found to have sigmoid diverticulitis. That was no longer present when seen on 13 August, but was hospitalized d/t outpatient abx failure. Sx improved while hospitalized. Currently still on Augmentin 875/125 3 times daily which was prescribed at discharge to complete a 14-day total course of antibiotic therapy. She has had some degree of loose stools passed multiple times per day each of relatively small volume since hospital discharge; has never had bloody stools. She had complete resolution of abdominal pain that she had had prior to and during her hospitalization; pain recurred today as previously noted although not as severe as prior. Somewhat decreased appetite today of note. She feels mildly nauseated but no episodes of vomiting. She was febrile to 101 °F at home earlier this evening. No abdominal distention. No dysuria/hematuria/urgency/frequency. She had discussed this situation with her primary physician who directed her to the ED for further evaluation. A/p: Recent hospitalization with treatment for diverticulitis, currently on antibiotic therapy but now with worsening pain and new fever suspicious for complication such as intra-abdominal abscess. Alternatively, given multiple recent abx courses, C diff colitis possible.   Alternative sites of infection such as pyelonephritis/cystitis are also possible although also less likely given that she is currently on antibiotic therapy. Check CT abdomen/pelvis without IV contrast (reports allergy to iodinated contrast with prior hives when administered IV contrast; all [facility] in [facility] show CTs performed without IV contrast); CBC/CMP/mag/lipase/lactic acid. Disposition pending. History provided by:  Patient and relative (Patient's daughter)  Abdominal Pain  Associated symptoms: fatigue, fever and nausea    Associated symptoms: no chills, no diarrhea, no dysuria, no hematuria and no vomiting        Prior to Admission Medications   Prescriptions Last Dose Informant Patient Reported? Taking?    ALPRAZolam (XANAX) 0.25 mg tablet More than a month  Yes No   Sig: Take 0.125-0.25 mg by mouth 2 (two) times a day as needed   acetaminophen (TYLENOL) 500 mg tablet 8/25/2023  Yes Yes   Sig: Take 500 mg by mouth every 4 (four) hours as needed for mild pain, headaches or fever   amoxicillin-clavulanate (AUGMENTIN) 875-125 mg per tablet 8/25/2023  No Yes   Sig: Take 1 tablet by mouth 3 (three) times a day for 12 days   aspirin (ECOTRIN LOW STRENGTH) 81 mg EC tablet 8/25/2023  Yes Yes   Sig: Take 81 mg by mouth daily   ergocalciferol (ERGOCALCIFEROL) 1.25 MG (16707 UT) capsule Not Taking  Yes No   Sig: Take 50,000 Units by mouth   Patient not taking: Reported on 8/25/2023   hydrochlorothiazide (HYDRODIURIL) 25 mg tablet  Self Yes Yes   Sig: Take 25 mg by mouth daily   levothyroxine 88 mcg tablet 8/25/2023  Yes Yes   Sig: Take 88 mcg by mouth daily   ondansetron (ZOFRAN) 4 mg tablet 8/25/2023  No Yes   Sig: Take 1 tablet (4 mg total) by mouth every 8 (eight) hours as needed for nausea or vomiting   simvastatin (ZOCOR) 40 mg tablet 8/24/2023  Yes Yes   Sig: Take 40 mg by mouth daily at bedtime      Facility-Administered Medications: None       Past Medical History:   Diagnosis Date   • Diverticulitis    • Hypertension    • TIA (transient ischemic attack)        Past Surgical History:   Procedure Laterality Date   • COLECTOMY         History reviewed. No pertinent family history. I have reviewed and agree with the history as documented. E-Cigarette/Vaping   • E-Cigarette Use Never User      E-Cigarette/Vaping Substances     Social History     Tobacco Use   • Smoking status: Every Day     Packs/day: 0.25     Types: Cigarettes   • Smokeless tobacco: Never   Vaping Use   • Vaping Use: Never used   Substance Use Topics   • Alcohol use: Never   • Drug use: Never       Review of Systems   Constitutional: Positive for fatigue and fever. Negative for chills and diaphoresis. Respiratory: Negative. Cardiovascular: Negative. Gastrointestinal: Positive for abdominal pain and nausea. Negative for abdominal distention, blood in stool, diarrhea and vomiting. Genitourinary: Negative for difficulty urinating, dysuria, frequency, hematuria and urgency. Skin: Negative. Hematological: Negative. All other systems reviewed and are negative. Physical Exam  Physical Exam  Vitals and nursing note reviewed. Constitutional:       General: She is awake. She is not in acute distress. Appearance: Normal appearance. She is well-developed. HENT:      Head: Normocephalic and atraumatic. Right Ear: Hearing and external ear normal.      Left Ear: Hearing and external ear normal.   Neck:      Trachea: Trachea and phonation normal.   Cardiovascular:      Rate and Rhythm: Normal rate and regular rhythm. Pulses:           Radial pulses are 2+ on the right side and 2+ on the left side. Dorsalis pedis pulses are 2+ on the right side and 2+ on the left side. Posterior tibial pulses are 2+ on the right side and 2+ on the left side. Heart sounds: Normal heart sounds, S1 normal and S2 normal. No murmur heard. No friction rub. No gallop. Pulmonary:      Effort: Pulmonary effort is normal. No respiratory distress.       Breath sounds: Normal breath sounds. No stridor. No decreased breath sounds, wheezing, rhonchi or rales. Abdominal:      General: There is no distension. Palpations: There is no mass. Tenderness: There is abdominal tenderness in the right lower quadrant, suprapubic area and left lower quadrant. There is no right CVA tenderness, left CVA tenderness, guarding or rebound. Skin:     General: Skin is warm and dry. Neurological:      Mental Status: She is alert and oriented to person, place, and time. GCS: GCS eye subscore is 4. GCS verbal subscore is 5. GCS motor subscore is 6. Cranial Nerves: No cranial nerve deficit. Sensory: No sensory deficit. Motor: No abnormal muscle tone. Comments: PERRLA; EOMI. Sensation intact to light touch over face in V1-V3 distribution bilaterally. Facial expressions symmetric. Tongue/uvula midline. Shoulder shrug equal bilaterally. Strength 5/5 in UE/LE bilaterally. Sensation intact to light touch in UE/LE bilaterally.          Vital Signs  ED Triage Vitals [08/25/23 1751]   Temperature Pulse Respirations Blood Pressure SpO2   99.6 °F (37.6 °C) 80 16 152/74 94 %      Temp Source Heart Rate Source Patient Position - Orthostatic VS BP Location FiO2 (%)   Temporal Monitor Sitting Left arm --      Pain Score       --           Vitals:    08/25/23 1751 08/25/23 2115   BP: 152/74 125/60   Pulse: 80 78   Patient Position - Orthostatic VS: Sitting          Visual Acuity      ED Medications  Medications   cefTRIAXone (ROCEPHIN) IVPB (premix in dextrose) 1,000 mg 50 mL (has no administration in time range)   metroNIDAZOLE (FLAGYL) IVPB (premix) 500 mg 100 mL (has no administration in time range)   ALPRAZolam (XANAX) tablet 0.25 mg (has no administration in time range)   aspirin (ECOTRIN LOW STRENGTH) EC tablet 81 mg (has no administration in time range)   levothyroxine tablet 88 mcg (has no administration in time range)   pravastatin (PRAVACHOL) tablet 80 mg (has no administration in time range)   sodium chloride 0.9 % infusion (has no administration in time range)   acetaminophen (TYLENOL) tablet 650 mg (has no administration in time range)   ondansetron (ZOFRAN) injection 4 mg (has no administration in time range)   nicotine (NICODERM CQ) 7 mg/24hr TD 24 hr patch 1 patch (has no administration in time range)   heparin (porcine) subcutaneous injection 5,000 Units (has no administration in time range)   metroNIDAZOLE (FLAGYL) IVPB (premix) 500 mg 100 mL (has no administration in time range)   cefTRIAXone (ROCEPHIN) IVPB (premix in dextrose) 1,000 mg 50 mL (has no administration in time range)   ondansetron (ZOFRAN) injection 4 mg (4 mg Intravenous Given 8/25/23 1814)   multi-electrolyte (ISOLYTE-S PH 7.4) bolus 1,000 mL (0 mL Intravenous Stopped 8/25/23 1914)   magnesium sulfate 2 g/50 mL IVPB (premix) 2 g (0 g Intravenous Stopped 8/25/23 2002)       Diagnostic Studies  Results Reviewed     Procedure Component Value Units Date/Time    UA w Reflex to Microscopic w Reflex to Culture [[id number]] Collected: 08/25/23 2002    Lab Status: Final result Specimen: Urine, Clean Catch Updated: 08/25/23 2009     Color, UA Light Yellow     Clarity, UA Clear     Specific Gravity, UA <=1.005     pH, UA 6.0     Leukocytes, UA Negative     Nitrite, UA Negative     Protein, UA Negative mg/dl      Glucose, UA Negative mg/dl      Ketones, UA Negative mg/dl      Urobilinogen, UA 0.2 E.U./dl      Bilirubin, UA Negative     Occult Blood, UA Negative    Clostridium difficile toxin by PCR with EIA [[id number]]     Lab Status: No result Specimen: Stool     Stool Enteric Bacterial Panel by PCR [[id number]]     Lab Status: No result Specimen: Stool     Fecal leukocytes [[id number]]     Lab Status: No result Specimen: Stool     Rotavirus antigen, stool [[id number]]     Lab Status: No result Specimen: Stool from Rectum     CMP [[id number]] Collected: 08/25/23 1814    Lab Status: Final result Specimen: Blood from Arm, Right Updated: 08/25/23 1842     Sodium 135 mmol/L      Potassium 3.5 mmol/L      Chloride 98 mmol/L      CO2 26 mmol/L      ANION GAP 11 mmol/L      BUN 11 mg/dL      Creatinine 0.81 mg/dL      Glucose 128 mg/dL      Calcium 9.4 mg/dL      AST 14 U/L      ALT 10 U/L      Alkaline Phosphatase 68 U/L      Total Protein 7.0 g/dL      Albumin 3.9 g/dL      Total Bilirubin 0.89 mg/dL      eGFR 67 ml/min/1.73sq m     Narrative:      [facility] (CKD):   •  Stage 1 with normal or high GFR (GFR > 90 mL/min/1.73 square meters)  •  Stage 2 Mild CKD (GFR = 60-89 mL/min/1.73 square meters)  •  Stage 3A Moderate CKD (GFR = 45-59 mL/min/1.73 square meters)  •  Stage 3B Moderate CKD (GFR = 30-44 mL/min/1.73 square meters)  •  Stage 4 Severe CKD (GFR = 15-29 mL/min/1.73 square meters)  •  Stage 5 End Stage CKD (GFR <15 mL/min/1.73 square meters)  Note: GFR calculation is accurate only with a steady state creatinine    Lipase [[id number]]  (Abnormal) Collected: 08/25/23 1814    Lab Status: Final result Specimen: Blood from Arm, Right Updated: 08/25/23 1842     Lipase 9 u/L     Magnesium [[id number]]  (Abnormal) Collected: 08/25/23 1814    Lab Status: Final result Specimen: Blood from Arm, Right Updated: 08/25/23 1842     Magnesium 1.5 mg/dL     CBC and differential [[id number]]  (Abnormal) Collected: 08/25/23 1814    Lab Status: Final result Specimen: Blood from Arm, Right Updated: 08/25/23 1841     WBC 16.69 Thousand/uL      RBC 5.02 Million/uL      Hemoglobin 14.4 g/dL      Hematocrit 44.4 %      MCV 88 fL      MCH 28.7 pg      MCHC 32.4 g/dL      RDW 14.3 %      MPV 9.9 fL      Platelets 349 Thousands/uL      nRBC 0 /100 WBCs      Neutrophils Relative 90 %      Immat GRANS % 0 %      Lymphocytes Relative 5 %      Monocytes Relative 5 %      Eosinophils Relative 0 %      Basophils Relative 0 %      Neutrophils Absolute 14.95 Thousands/µL      Immature Grans Absolute 0.05 Thousand/uL      Lymphocytes Absolute 0.77 Thousands/µL      Monocytes Absolute 0.86 Thousand/µL      Eosinophils Absolute 0.03 Thousand/µL      Basophils Absolute 0.03 Thousands/µL     Narrative: This is an appended report. These results have been appended to a previously verified report. Lactic acid, plasma (w/reflex if result > 2.0) [[id number]]  (Normal) Collected: 08/25/23 1814    Lab Status: Final result Specimen: Blood from Arm, Right Updated: 08/25/23 1837     LACTIC ACID 0.8 mmol/L     Narrative:      Result may be elevated if tourniquet was used during collection. CT abdomen pelvis wo contrast   Final Result by Marvin Canada MD (08/25 1957)   1. Interval increased diffuse sigmoid wall thickening with adjacent inflammatory change, most concerning for acute diverticulitis. No fluid collections. 2. Improving inflamed jejunal diverticulum. Workstation performed: [id number]                    Procedures  Procedures         ED Course  ED Course as of 08/25/23 2137   Fri Aug 25, 2023   1825 CBC and differential(!)  Leukocytosis increased from prior  Hemoglobin/hematocrit normal  Platelets normal   1547 Lactic acid, plasma (w/reflex if result > 2.0)  Normal   1842 Lipase(!)  Below reference range   1842 Magnesium(!)  Hypomagnesemia likely related to ongoing diarrhea; will replete intravenously   1842 CMP  Electrolytes and transaminases normal   1847 Patient to CT scan now   1853 CT completed and awaiting interpretation   1949 CT being interpreted now by radiologist   2000 CT abdomen pelvis wo contrast     IMPRESSION:  1. Interval increased diffuse sigmoid wall thickening with adjacent inflammatory change, most concerning for acute diverticulitis. No fluid collections.   2. Improving inflamed jejunal diverticulum.      2009 UA w Reflex to Microscopic w Reflex to Culture  Dilute sample; no markers of infection   2018 Reviewed CT findings with patient and her family members. Given the new area of sigmoid diverticulitis that has developed despite being on appropriate oral antibiotic therapy, this is a complicated infection not amenable to outpatient therapy. Will change antibiotic therapy to alternate agent and discuss with internal medicine. May require surgical and/or infectious disease consultation while hospitalized. At present however no critical illness suggesting high risk of decompensation or findings that require surgical intervention at this point. Patient is agreeable to hospitalization. 2025 Tiger text sent to internal medicine   2036 D/w ZOIE Plunkett in the ED: Admit on observation status to service of Dr. Mena Howard       MDM    Disposition  Final diagnoses:   Diverticulitis of sigmoid colon   Hypomagnesemia     Time reflects when diagnosis was documented in both MDM as applicable and the Disposition within this note     Time User Action Codes Description Comment    8/25/2023  8:36 PM Dank Marion [K57.32] Diverticulitis of sigmoid colon     8/25/2023  8:36 PM Dank Marion [E83.42] Hypomagnesemia       ED Disposition     ED Disposition   Admit    Condition   Stable    Date/Time   Fri Aug 25, 2023  8:36 PM    Comment   Case was discussed with ZOIE Dnun and the patient's admission status was agreed to be Admission Status: observation status to the service of Dr. Mena Howard.            Follow-up Information    None         Current Discharge Medication List      CONTINUE these medications which have NOT CHANGED    Details   acetaminophen (TYLENOL) 500 mg tablet Take 500 mg by mouth every 4 (four) hours as needed for mild pain, headaches or fever      amoxicillin-clavulanate (AUGMENTIN) 875-125 mg per tablet Take 1 tablet by mouth 3 (three) times a day for 12 days  Qty: 36 tablet, Refills: 0    Associated Diagnoses: Acute diverticulitis      aspirin (ECOTRIN LOW STRENGTH) 81 mg EC tablet Take 81 mg by mouth daily      hydrochlorothiazide (HYDRODIURIL) 25 mg tablet Take 25 mg by mouth daily      levothyroxine 88 mcg tablet Take 88 mcg by mouth daily      ondansetron (ZOFRAN) 4 mg tablet Take 1 tablet (4 mg total) by mouth every 8 (eight) hours as needed for nausea or vomiting  Qty: 12 tablet, Refills: 0    Associated Diagnoses: Diverticulitis      simvastatin (ZOCOR) 40 mg tablet Take 40 mg by mouth daily at bedtime      ALPRAZolam (XANAX) 0.25 mg tablet Take 0.125-0.25 mg by mouth 2 (two) times a day as needed      ergocalciferol (ERGOCALCIFEROL) 1.25 MG (74770 UT) capsule Take 50,000 Units by mouth             No discharge procedures on file.     PDMP Review       Value Time User    PDMP Reviewed  Yes 8/25/2023  9:06 PM Yu Najera PA-C          ED Provider  Electronically Signed by           Racheal Garcia DO  08/25/23 3945

## 2023-08-26 LAB
ALBUMIN SERPL BCP-MCNC: 3.3 G/DL (ref 3.5–5)
ALP SERPL-CCNC: 57 U/L (ref 34–104)
ALT SERPL W P-5'-P-CCNC: 8 U/L (ref 7–52)
ANION GAP SERPL CALCULATED.3IONS-SCNC: 10 MMOL/L
AST SERPL W P-5'-P-CCNC: 11 U/L (ref 13–39)
BASOPHILS # BLD AUTO: 0.04 THOUSANDS/ÂΜL (ref 0–0.1)
BASOPHILS NFR BLD AUTO: 0 % (ref 0–1)
BILIRUB SERPL-MCNC: 0.81 MG/DL (ref 0.2–1)
BUN SERPL-MCNC: 9 MG/DL (ref 5–25)
CALCIUM ALBUM COR SERPL-MCNC: 9.4 MG/DL (ref 8.3–10.1)
CALCIUM SERPL-MCNC: 8.8 MG/DL (ref 8.4–10.2)
CHLORIDE SERPL-SCNC: 101 MMOL/L (ref 96–108)
CO2 SERPL-SCNC: 26 MMOL/L (ref 21–32)
CREAT SERPL-MCNC: 0.87 MG/DL (ref 0.6–1.3)
EOSINOPHIL # BLD AUTO: 0.05 THOUSAND/ÂΜL (ref 0–0.61)
EOSINOPHIL NFR BLD AUTO: 0 % (ref 0–6)
ERYTHROCYTE [DISTWIDTH] IN BLOOD BY AUTOMATED COUNT: 14.7 % (ref 11.6–15.1)
GFR SERPL CREATININE-BSD FRML MDRD: 61 ML/MIN/1.73SQ M
GLUCOSE SERPL-MCNC: 114 MG/DL (ref 65–140)
GLUCOSE SERPL-MCNC: 118 MG/DL (ref 65–140)
GLUCOSE SERPL-MCNC: 123 MG/DL (ref 65–140)
GLUCOSE SERPL-MCNC: 89 MG/DL (ref 65–140)
HCT VFR BLD AUTO: 37.1 % (ref 34.8–46.1)
HGB BLD-MCNC: 12.3 G/DL (ref 11.5–15.4)
IMM GRANULOCYTES # BLD AUTO: 0.07 THOUSAND/UL (ref 0–0.2)
IMM GRANULOCYTES NFR BLD AUTO: 1 % (ref 0–2)
LYMPHOCYTES # BLD AUTO: 0.97 THOUSANDS/ÂΜL (ref 0.6–4.47)
LYMPHOCYTES NFR BLD AUTO: 7 % (ref 14–44)
MAGNESIUM SERPL-MCNC: 1.8 MG/DL (ref 1.9–2.7)
MCH RBC QN AUTO: 28.9 PG (ref 26.8–34.3)
MCHC RBC AUTO-ENTMCNC: 33.2 G/DL (ref 31.4–37.4)
MCV RBC AUTO: 87 FL (ref 82–98)
MONOCYTES # BLD AUTO: 1.01 THOUSAND/ÂΜL (ref 0.17–1.22)
MONOCYTES NFR BLD AUTO: 7 % (ref 4–12)
NEUTROPHILS # BLD AUTO: 12.1 THOUSANDS/ÂΜL (ref 1.85–7.62)
NEUTS SEG NFR BLD AUTO: 85 % (ref 43–75)
NRBC BLD AUTO-RTO: 0 /100 WBCS
PHOSPHATE SERPL-MCNC: 3.4 MG/DL (ref 2.3–4.1)
PLATELET # BLD AUTO: 270 THOUSANDS/UL (ref 149–390)
PMV BLD AUTO: 10.8 FL (ref 8.9–12.7)
POTASSIUM SERPL-SCNC: 3.1 MMOL/L (ref 3.5–5.3)
PROT SERPL-MCNC: 5.9 G/DL (ref 6.4–8.4)
RBC # BLD AUTO: 4.25 MILLION/UL (ref 3.81–5.12)
SODIUM SERPL-SCNC: 137 MMOL/L (ref 135–147)
WBC # BLD AUTO: 14.24 THOUSAND/UL (ref 4.31–10.16)

## 2023-08-26 PROCEDURE — 85025 COMPLETE CBC W/AUTO DIFF WBC: CPT | Performed by: PHYSICIAN ASSISTANT

## 2023-08-26 PROCEDURE — 83735 ASSAY OF MAGNESIUM: CPT | Performed by: PHYSICIAN ASSISTANT

## 2023-08-26 PROCEDURE — 97166 OT EVAL MOD COMPLEX 45 MIN: CPT

## 2023-08-26 PROCEDURE — 82948 REAGENT STRIP/BLOOD GLUCOSE: CPT

## 2023-08-26 PROCEDURE — 87505 NFCT AGENT DETECTION GI: CPT | Performed by: PHYSICIAN ASSISTANT

## 2023-08-26 PROCEDURE — 99232 SBSQ HOSP IP/OBS MODERATE 35: CPT | Performed by: PHYSICIAN ASSISTANT

## 2023-08-26 PROCEDURE — 84100 ASSAY OF PHOSPHORUS: CPT | Performed by: PHYSICIAN ASSISTANT

## 2023-08-26 PROCEDURE — 87425 ROTAVIRUS AG IA: CPT | Performed by: PHYSICIAN ASSISTANT

## 2023-08-26 PROCEDURE — 80053 COMPREHEN METABOLIC PANEL: CPT | Performed by: PHYSICIAN ASSISTANT

## 2023-08-26 RX ORDER — MAGNESIUM SULFATE 1 G/100ML
1 INJECTION INTRAVENOUS ONCE
Status: COMPLETED | OUTPATIENT
Start: 2023-08-26 | End: 2023-08-26

## 2023-08-26 RX ORDER — ALPRAZOLAM 0.25 MG/1
0.25 TABLET ORAL 2 TIMES DAILY PRN
Status: DISCONTINUED | OUTPATIENT
Start: 2023-08-26 | End: 2023-09-06 | Stop reason: HOSPADM

## 2023-08-26 RX ORDER — POTASSIUM CHLORIDE 20 MEQ/1
40 TABLET, EXTENDED RELEASE ORAL 2 TIMES DAILY
Status: COMPLETED | OUTPATIENT
Start: 2023-08-26 | End: 2023-08-26

## 2023-08-26 RX ADMIN — PRAVASTATIN SODIUM 80 MG: 40 TABLET ORAL at 15:47

## 2023-08-26 RX ADMIN — HEPARIN SODIUM 5000 UNITS: 5000 INJECTION INTRAVENOUS; SUBCUTANEOUS at 21:17

## 2023-08-26 RX ADMIN — POTASSIUM CHLORIDE 40 MEQ: 1500 TABLET, EXTENDED RELEASE ORAL at 17:33

## 2023-08-26 RX ADMIN — PIPERACILLIN AND TAZOBACTAM 3.38 G: 3; .375 INJECTION, POWDER, FOR SOLUTION INTRAVENOUS at 17:33

## 2023-08-26 RX ADMIN — POTASSIUM CHLORIDE 40 MEQ: 1500 TABLET, EXTENDED RELEASE ORAL at 09:31

## 2023-08-26 RX ADMIN — PIPERACILLIN AND TAZOBACTAM 3.38 G: 3; .375 INJECTION, POWDER, FOR SOLUTION INTRAVENOUS at 13:09

## 2023-08-26 RX ADMIN — PIPERACILLIN AND TAZOBACTAM 3.38 G: 3; .375 INJECTION, POWDER, FOR SOLUTION INTRAVENOUS at 23:28

## 2023-08-26 RX ADMIN — ACETAMINOPHEN 650 MG: 325 TABLET, FILM COATED ORAL at 21:17

## 2023-08-26 RX ADMIN — ACETAMINOPHEN 650 MG: 325 TABLET, FILM COATED ORAL at 15:22

## 2023-08-26 RX ADMIN — LEVOTHYROXINE SODIUM 88 MCG: 88 TABLET ORAL at 05:00

## 2023-08-26 RX ADMIN — ALPRAZOLAM 0.25 MG: 0.25 TABLET ORAL at 21:17

## 2023-08-26 RX ADMIN — MAGNESIUM SULFATE HEPTAHYDRATE 1 G: 1 INJECTION, SOLUTION INTRAVENOUS at 10:31

## 2023-08-26 RX ADMIN — METRONIDAZOLE 500 MG: 500 INJECTION, SOLUTION INTRAVENOUS at 05:00

## 2023-08-26 RX ADMIN — ACETAMINOPHEN 650 MG: 325 TABLET, FILM COATED ORAL at 04:51

## 2023-08-26 RX ADMIN — HEPARIN SODIUM 5000 UNITS: 5000 INJECTION INTRAVENOUS; SUBCUTANEOUS at 05:00

## 2023-08-26 RX ADMIN — ASPIRIN 81 MG: 81 TABLET, COATED ORAL at 09:18

## 2023-08-26 RX ADMIN — SODIUM CHLORIDE 75 ML/HR: 0.9 INJECTION, SOLUTION INTRAVENOUS at 20:12

## 2023-08-26 RX ADMIN — ONDANSETRON 4 MG: 2 INJECTION INTRAMUSCULAR; INTRAVENOUS at 04:58

## 2023-08-26 RX ADMIN — HEPARIN SODIUM 5000 UNITS: 5000 INJECTION INTRAVENOUS; SUBCUTANEOUS at 15:23

## 2023-08-26 NOTE — CASE MANAGEMENT
Case Management Assessment & Discharge Planning Note    Patient name Samina Marks  Location [address] [facility] [place] [address]/[place] 56 MRN [id number]  : 1940 Date 2023       Current Admission Date: 2023  Current Admission Diagnosis:Sigmoid diverticulitis   Patient Active Problem List    Diagnosis Date Noted   • Sigmoid diverticulitis 2023   • Other hyperlipidemia 2023   • Anxiety 2023   • Adrenal adenoma, left 2023   • Diverticulitis 2023   • Type 2 diabetes mellitus ([address] ) 2023   • H/O TIA (transient ischemic attack) and stroke 2023   • KAYLA (acute kidney injury) ([address] ) 2023   • Hypothyroidism 2023   • Hypertension 2023   • Bradycardia 2023   • Hypomagnesemia 2023      LOS (days): 0  Geometric Mean LOS (GMLOS) (days):   Days to GMLOS:     OBJECTIVE:  PATIENT READMITTED TO HOSPITAL            Current admission status: Inpatient       Preferred Pharmacy:   [address] [place] [address], [address] [place] [address]  Phone: [phone] Fax: [phone]    [facility], [address] [place] [address]  Phone: [phone] Fax: [phone]    Primary Care Provider: No primary care provider on file. Primary Insurance: MEDICARE  Secondary Insurance: AETNA    ASSESSMENT:  Active Health Care Proxies    There are no active Health Care Proxies on file.        Advance Directives  Does patient have a [facility]?: No  Was patient offered paperwork?: Yes (declines)  Does patient currently have a Health Care decision maker?: Yes, please see Health Care Proxy section  Does patient have Advance Directives?: No  Was patient offered paperwork?: Yes (declines)  Primary Contact: Tammy Evans (Daughter)   [phone] (M)         Readmission Root Cause  30 Day Readmission: Yes (-)  Who directed you to return to the hospital?: Self  Did you understand whom to contact if you had questions or problems?: Yes  Did you get your prescriptions before you left the hospital?: No  Reason[de-identified] Preference for own pharmacy  Were you able to get your prescriptions filled when you left the hospital?: Yes  Did you take your medications as prescribed?: Yes  Were you able to get to your follow-up appointments?: Yes  During previous admission, was a post-acute recommendation made?: No  Patient was readmitted due to: sigmoid diverticulitis  Action Plan: home on dc OP follow up    Patient Information  Admitted from[de-identified] Home  Mental Status: Alert  During Assessment patient was accompanied by: Not accompanied during assessment  Assessment information provided by[de-identified] Patient  Primary Caregiver: Self  Support Systems: Son, Daughter  [place] of Residence: Other (specify in comment box) ([address])  What city do you live in?: [address] [place] entry access options. Select all that apply.: Stairs  Number of steps to enter home.: 1  Type of Current Residence: 2 story home  Upon entering residence, is there a bedroom on the main floor (no further steps)?: Yes  Upon entering residence, is there a bathroom on the main floor (no further steps)?: Yes  In the last 12 months, was there a time when you were not able to pay the mortgage or rent on time?: No  In the last 12 months, how many places have you lived?: 1  In the last 12 months, was there a time when you did not have a steady place to sleep or slept in a shelter (including now)?: No  Homeless/housing insecurity resource given?: N/A  Living Arrangements: Lives Alone  Is patient a ?: No    Activities of Daily Living Prior to Admission  Functional Status: Independent  Completes ADLs independently?: Yes  Ambulates independently?: Yes  Does patient use assisted devices?: Yes  Assisted Devices (DME) used:  Wheelchair, Olga Dulce Maria  Does patient currently own DME?: Yes  What DME does the patient currently own?: [address] Maria, Wheelchair  Does patient have a history of Outpatient Therapy (PT/OT)?: No  Does the patient have a history of Short-Term Rehab?: No  Does patient have a history of HHC?: No  Does patient currently have [facility]?: No         Patient Information Continued  Income Source: Pension/group home  Does patient have prescription coverage?: Yes  Within the past 12 months, you worried that your food would run out before you got the money to buy more.: Never true  Within the past 12 months, the food you bought just didn't last and you didn't have money to get more.: Never true  Food insecurity resource given?: N/A  Does patient receive dialysis treatments?: No  Does patient have a history of substance abuse?: No  Does patient have a history of Mental Health Diagnosis?: Yes (anxiety)  Is patient receiving treatment for mental health?: No. Patient declined treatment information.   Has patient received inpatient treatment related to mental health in the last 2 years?: No         Means of Transportation  Means of Transport to Appts[de-identified] Family transport  In the past 12 months, has lack of transportation kept you from medical appointments or from getting medications?: No  In the past 12 months, has lack of transportation kept you from meetings, work, or from getting things needed for daily living?: No  Was application for public transport provided?: N/A        DISCHARGE DETAILS:    Discharge planning discussed with[de-identified] patient        CM contacted family/caregiver?: No- see comments (declines at this time)  Were Treatment Team discharge recommendations reviewed with patient/caregiver?: Yes  Did patient/caregiver verbalize understanding of patient care needs?: Yes  Were patient/caregiver advised of the risks associated with not following Treatment Team discharge recommendations?: Yes         [address]          Is the patient interested in [facility] at discharge?: No    DME Referral Provided  Referral made for DME?: No         Would you like to participate in our [facility]?  : No - Declined       Discharge Destination Plan[de-identified] Home  Transport at Discharge : Family   Plans at this time are home on dc with OP follow up. No CM needs identified at this time. Will follow and assist in dc planning.

## 2023-08-26 NOTE — ASSESSMENT & PLAN NOTE
Continue on simvastatin 40 mg p.o. at bedtime  Will give formulary alternative statin 80 mg p.o. at bedtime  PCP follow up post discharge

## 2023-08-26 NOTE — PROGRESS NOTES
[address]  Progress Note  Name: Soledad Ivory I  MRN: [id number]  Unit/Bed#:  I Date of Admission: 8/25/2023   Date of Service: 8/26/2023 I Hospital Day: 0    Assessment/Plan   Other hyperlipidemia  Assessment & Plan  · Continue on simvastatin 40 mg p.o. at bedtime  · Will give formulary alternative statin 80 mg p.o. at bedtime  · PCP follow up post discharge     [address]  · Currently stable  · Continue as needed Xanax      Hypomagnesemia  Assessment & Plan  · Replete as needed    Hypertension  Assessment & Plan  · Blood pressure stable  · No currently on Medication regimen. She reports that she was taken off blood pressure medication on her last admission  · Will monitor blood pressure while admitted - soft on 8/26  · Monitor closely for hypotension and address as needed    Hypothyroidism  Assessment & Plan  · Continue levothyroxine    Type 2 diabetes mellitus ([address])  Assessment & Plan  Lab Results   Component Value Date    HGBA1C 6.0 (H) 07/26/2023     Reports that she is currently diet controlled  Fingerstick glucose q6hr while NPO      (P) 118    * Sigmoid diverticulitis  Assessment & Plan  · Presented  to the emergency room with complaints of abdominal pain  · Associated with fever, nausea and nonbloody loose stools  · Patient was admitted from 8/13-8/16 for jejunal diverticulitis. Was  being treated with PO Augmentin as outpatient  · CT shows-1. Interval increased diffuse sigmoid wall thickening with adjacent inflammatory change, most concerning for acute diverticulitis. No fluid collections. 2. Improving inflamed jejunal diverticulum.   · Received ceftriaxone and IV Flagyl in the ER - with complaints of nausea with flagyl switch to IV Zosyn  · Keep NPO for now  · IV fluids to be continued  · Pain medication PRN  · Check stool studies             VTE Pharmacologic Prophylaxis:   Moderate Risk (Score 3-4) - Pharmacological DVT Prophylaxis Ordered: heparin. Patient Centered Rounds: I performed bedside rounds with nursing staff today. Discussions with Specialists or Other Care Team Provider: case management    Education and Discussions with Family / Patient: Updated  (friend) at bedside. Total Time Spent on Date of Encounter in care of patient: 45 minutes This time was spent on one or more of the following: performing physical exam; counseling and coordination of care; obtaining or reviewing history; documenting in the medical record; reviewing/ordering tests, medications or procedures; communicating with other healthcare professionals and discussing with patient's family/caregivers. Current Length of Stay: 0 day(s)  Current Patient Status: Inpatient   Certification Statement: The patient will continue to require additional inpatient hospital stay due to npo, abdominal pain, nausea, iv abx  Discharge Plan: Anticipate discharge in 48 hrs to home. Code Status: Level 1 - Full Code    Subjective:   Patient reports continued nausea which usually occurs with flagyl. She also notes having frequent small bowel movements     Objective:     Vitals:   Temp (24hrs), Av.8 °F (37.7 °C), Min:99.1 °F (37.3 °C), Max:100.6 °F (38.1 °C)    Temp:  [99.1 °F (37.3 °C)-100.6 °F (38.1 °C)] 99.1 °F (37.3 °C)  HR:  [74-80] 74  Resp:  [16] 16  BP: (101-152)/(53-74) 101/53  SpO2:  [89 %-94 %] 89 %  Body mass index is 28.35 kg/m². Input and Output Summary (last 24 hours): Intake/Output Summary (Last 24 hours) at 2023 1032  Last data filed at 2023  Gross per 24 hour   Intake 1050 ml   Output --   Net 1050 ml       Physical Exam:   Physical Exam  Vitals and nursing note reviewed. Constitutional:       General: She is not in acute distress. Appearance: She is ill-appearing. Cardiovascular:      Rate and Rhythm: Normal rate and regular rhythm. Pulses: Normal pulses. Heart sounds: Normal heart sounds.    Pulmonary: Effort: Pulmonary effort is normal.      Breath sounds: Normal breath sounds. Abdominal:      Tenderness: There is abdominal tenderness. There is no guarding or rebound. Hernia: No hernia is present. Musculoskeletal:      Right lower leg: No edema. Left lower leg: No edema. Skin:     General: Skin is warm and dry. Neurological:      Mental Status: She is alert. Mental status is at baseline.    Psychiatric:         Mood and Affect: Mood normal.         Behavior: Behavior normal.          Additional Data:     Labs:  Results from last 7 days   Lab Units 08/26/23  0455   WBC Thousand/uL 14.24*   HEMOGLOBIN g/dL 12.3   HEMATOCRIT % 37.1   PLATELETS Thousands/uL 270   NEUTROS PCT % 85*   LYMPHS PCT % 7*   MONOS PCT % 7   EOS PCT % 0     Results from last 7 days   Lab Units 08/26/23  0556   SODIUM mmol/L 137   POTASSIUM mmol/L 3.1*   CHLORIDE mmol/L 101   CO2 mmol/L 26   BUN mg/dL 9   CREATININE mg/dL 0.87   ANION GAP mmol/L 10   CALCIUM mg/dL 8.8   ALBUMIN g/dL 3.3*   TOTAL BILIRUBIN mg/dL 0.81   ALK PHOS U/L 57   ALT U/L 8   AST U/L 11*   GLUCOSE RANDOM mg/dL 123         Results from last 7 days   Lab Units 08/26/23  0700   POC GLUCOSE mg/dl 118         Results from last 7 days   Lab Units 08/25/23  1814   LACTIC ACID mmol/L 0.8       Lines/Drains:  Invasive Devices     Peripheral Intravenous Line  Duration           Peripheral IV 08/13/23 Right Antecubital 12 days                      Imaging: Reviewed radiology reports from this admission including: abdominal/pelvic CT    Recent Cultures (last 7 days):         Last 24 Hours Medication List:   Current Facility-Administered Medications   Medication Dose Route Frequency Provider Last Rate   • acetaminophen  650 mg Oral Q6H PRN Shaun Calvillo PA-C     • ALPRAZolam  0.25 mg Oral BID PRN Shaun Calvillo PA-C     • aspirin  81 mg Oral Daily Shaun Calvillo PA-C     • heparin (porcine)  5,000 Units Subcutaneous Q8H [address] Shaun Calvillo PA-C     • levothyroxine  88 mcg Oral Early Morning Shaun Calvillo PA-C     • magnesium sulfate  1 g Intravenous Once Hortencia Mccloud PA-C     • nicotine  1 patch Transdermal Daily Shaun Calvillo PA-C     • piperacillin-tazobactam  3.375 g Intravenous Q6H Hortencia Mccloud PA-C     • potassium chloride  40 mEq Oral BID Hortencia Mccloud PA-C     • pravastatin  80 mg Oral Daily With Dinner Shaun Calvillo PA-C     • sodium chloride  75 mL/hr Intravenous Continuous Shaun Calvillo PA-C 75 mL/hr (08/25/23 2227)   • trimethobenzamide  200 mg Intramuscular Q6H PRN Hortencia Mccloud PA-C          Today, Patient Was Seen By: Hortencia Mccloud PA-C    **Please Note: This note may have been constructed using a voice recognition system. **

## 2023-08-26 NOTE — ASSESSMENT & PLAN NOTE
Lab Results   Component Value Date    HGBA1C 6.0 (H) 07/26/2023     Reports that she is currently diet controlled  Fingerstick glucose q6hr while NPO      (P) 118

## 2023-08-26 NOTE — PLAN OF CARE
Problem: PAIN - ADULT  Goal: Verbalizes/displays adequate comfort level or baseline comfort level  Description: Interventions:  - Encourage patient to monitor pain and request assistance  - Assess pain using appropriate pain scale  - Administer analgesics based on type and severity of pain and evaluate response  - Implement non-pharmacological measures as appropriate and evaluate response  - Consider cultural and social influences on pain and pain management  - Notify physician/advanced practitioner if interventions unsuccessful or patient reports new pain  Outcome: Progressing     Problem: SAFETY ADULT  Goal: Patient will remain free of falls  Description: INTERVENTIONS:  - Educate patient/family on patient safety including physical limitations  - Instruct patient to call for assistance with activity   - Consult OT/PT to assist with strengthening/mobility   - Keep Call bell within reach  - Keep bed low and locked with side rails adjusted as appropriate  - Keep care items and personal belongings within reach  - Initiate and maintain comfort rounds  - Make Fall Risk Sign visible to staff  - Offer Toileting every 2 Hours, in advance of need  - Apply yellow socks and bracelet for high fall risk patients  - Consider moving patient to room near nurses station  Outcome: Progressing     Problem: DISCHARGE PLANNING  Goal: Discharge to home or other facility with appropriate resources  Description: INTERVENTIONS:  - Identify barriers to discharge w/patient and caregiver  - Arrange for needed discharge resources and transportation as appropriate  - Identify discharge learning needs (meds, wound care, etc.)  - Arrange for interpretive services to assist at discharge as needed  - Refer to Case Management Department for coordinating discharge planning if the patient needs post-hospital services based on physician/advanced practitioner order or complex needs related to functional status, cognitive ability, or social support system  Outcome: Progressing     Problem: Knowledge Deficit  Goal: Patient/family/caregiver demonstrates understanding of disease process, treatment plan, medications, and discharge instructions  Description: Complete learning assessment and assess knowledge base.   Interventions:  - Provide teaching at level of understanding  - Provide teaching via preferred learning methods  Outcome: Progressing

## 2023-08-26 NOTE — ASSESSMENT & PLAN NOTE
· Presented  to the emergency room with complaints of abdominal pain  · Associated with fever, nausea and nonbloody loose stools  · Patient was admitted from 8/13-8/16 for jejunal diverticulitis. Was  being treated with PO Augmentin as outpatient  · CT shows-1. Interval increased diffuse sigmoid wall thickening with adjacent inflammatory change, most concerning for acute diverticulitis. No fluid collections. 2. Improving inflamed jejunal diverticulum.   · Received ceftriaxone and IV Flagyl in the ER - with complaints of nausea with flagyl switch to IV Zosyn  · Keep NPO for now  · IV fluids to be continued  · Pain medication PRN  · Check stool studies

## 2023-08-26 NOTE — ASSESSMENT & PLAN NOTE
Lab Results   Component Value Date    HGBA1C 6.0 (H) 07/26/2023     History of diabetes type 2  Currently on p.o. insulin regimen  Reports that she is currently diet controlled  Fingerstick glucose 4 times daily AC/HS  Will add correctional insulin if BGL elevated  Diet LEN/CHO controlled   Will monitor while admitted

## 2023-08-26 NOTE — ASSESSMENT & PLAN NOTE
· Continue on simvastatin 40 mg p.o. at bedtime  · Will give formulary alternative statin 80 mg p.o. at bedtime  · PCP follow up post discharge

## 2023-08-26 NOTE — OCCUPATIONAL THERAPY NOTE
Occupational Therapy Evaluation     Patient Name: Deanna Queen  [id number] Date: 8/26/2023  Problem List  Principal Problem:    Sigmoid diverticulitis  Active Problems:    Type 2 diabetes mellitus ([address])    Hypothyroidism    Hypertension    Hypomagnesemia    Anxiety    Other hyperlipidemia    Past Medical History  Past Medical History:   Diagnosis Date   • Diverticulitis    • Hypertension    • TIA (transient ischemic attack)      Past Surgical History  Past Surgical History:   Procedure Laterality Date   • COLECTOMY               08/26/23 0833   OT Last Visit   OT Visit Date 08/26/23   Note Type   Note type Evaluation   Pain Assessment   Pain Assessment Tool 0-10   Pain Score 3   Pain Location/Orientation Location: Abdomen   Restrictions/Precautions   Weight Bearing Precautions Per Order No   Other Precautions Fall Risk;Pain;Multiple lines   Home Living   Type of [address] [place] [address] Two level;Performs ADLs on one level;Stairs to enter without rails; Other (Comment)  (1 LILIAN no HR)   Bathroom Shower/Tub Tub/shower unit   [employer] Raised   Bathroom Equipment Grab bars in shower; Shower chair;Grab bars around toilet   [address] Walker;Cane;Wheelchair-manual;Grab bars   Additional Comments pt reports no device  during mobility at baseline   Prior Function   Level of [place] Independent with ADLs; Independent with functional mobility; Independent with IADLS   Lives With Alone   IADLs Independent with driving   Falls in the last 6 months 0   Vocational Retired   Subjective   Subjective "I go a little bit, and then that's that"   ADL   Where Assessed Other (Comment)  (bathroom)   [address] [facility]  7  Independent   Additional Comments pt with no functional deficits during ADL performance   Bed Mobility   Supine to Sit 7  Independent   Sit to Supine 7  Independent   Additional Comments pt on RA during session; SpO2 WFL with no complaints of SOB   Transfers   Sit to Stand 7  Independent   Stand to Sit 7  Independent   Toilet transfer 7  Independent   Additional Comments pt performed functional transfers with no device; no siginficant LOB or instability   Functional Mobility   Functional Mobility 7  Independent   Additional Comments pt performed functional mobility with no device in hallway, pushing IV pole; no significant difficulties   Additional items   (no device)   Balance   Static Sitting Good   Dynamic Sitting Good   Static Standing Good   Dynamic Standing Good   Ambulatory Good   Activity Tolerance   Activity Tolerance Patient tolerated treatment well   RUE Assessment   RUE Assessment WFL   LUE Assessment   LUE Assessment WFL   Hand Function   Gross Motor Coordination Functional   Fine Motor Coordination Functional   Sensation   Light Touch No apparent deficits   Sharp/Dull No apparent deficits   Psychosocial   Psychosocial (WDL) WDL   Cognition   Overall Cognitive Status WFL   Arousal/Participation Alert   Attention Within functional limits   Orientation Level Oriented X4   Memory Within functional limits   Following Commands Follows all commands and directions without difficulty   Assessment   Assessment Patient is a 79 y.o. female admitted to [address] [place] [address] on 8/25/2023 due to Sigmoid diverticulitis. Pt performed at (I) level with no OT needs identified at this time Comorbidities affecting pt's physical performance at time of assessment include diverticulitis, TIA, HTN. The patient's raw score on the AM-PAC Daily Activity Inpatient Short Form is 24. A raw score of greater than or equal to 19 suggests the patient may benefit from discharge to home. Please refer to the recommendation of the Occupational Therapist for safe discharge planning. From OT standpoint recommend anticipate no needs upon D/C. No further acute OT needs identified at this time.  Recommend continued mobilization with hospital staff and restorative services while in the hospital to increase pt’s endurance and strength upon D/C. From OT standpoint, recommend D/C to home with family support when medically cleared. D/C pt from OT caseload at this time.     Goals   Patient Goals to go home   Recommendation   OT Discharge Recommendation No rehabilitation needs   AM-PAC Daily Activity Inpatient   Lower Body Dressing 4   Bathing 4   Toileting 4   Upper Body Dressing 4   Grooming 4   Eating 4   Daily Activity Raw Score 24   Daily Activity Standardized Score (Calc for Raw Score >=11) 57.54   AM-PAC Applied Cognition Inpatient   Following a Speech/Presentation 4   Understanding Ordinary Conversation 4   Taking Medications 4   Remembering Where Things Are Placed or Put Away 4   Remembering List of 4-5 Errands 4   Taking Care of Complicated Tasks 4   Applied Cognition Raw Score 24   Applied Cognition Standardized Score 62.21

## 2023-08-26 NOTE — PLAN OF CARE
Problem: PAIN - ADULT  Goal: Verbalizes/displays adequate comfort level or baseline comfort level  Description: Interventions:  - Encourage patient to monitor pain and request assistance  - Assess pain using appropriate pain scale  - Administer analgesics based on type and severity of pain and evaluate response  - Implement non-pharmacological measures as appropriate and evaluate response  - Consider cultural and social influences on pain and pain management  - Notify physician/advanced practitioner if interventions unsuccessful or patient reports new pain  Outcome: Progressing     Problem: INFECTION - ADULT  Goal: Absence or prevention of progression during hospitalization  Description: INTERVENTIONS:  - Assess and monitor for signs and symptoms of infection  - Monitor lab/diagnostic results  - Monitor all insertion sites, i.e. indwelling lines, tubes, and drains  - Monitor endotracheal if appropriate and nasal secretions for changes in amount and color  - [place] appropriate cooling/warming therapies per order  - Administer medications as ordered  - Instruct and encourage patient and family to use good hand hygiene technique  - Identify and instruct in appropriate isolation precautions for identified infection/condition  Outcome: Progressing  Goal: Absence of fever/infection during neutropenic period  Description: INTERVENTIONS:  - Monitor WBC    Outcome: Progressing     Problem: SAFETY ADULT  Goal: Patient will remain free of falls  Description: INTERVENTIONS:  - Educate patient/family on patient safety including physical limitations  - Instruct patient to call for assistance with activity   - Consult OT/PT to assist with strengthening/mobility   - Keep Call bell within reach  - Keep bed low and locked with side rails adjusted as appropriate  - Keep care items and personal belongings within reach  - Initiate and maintain comfort rounds  - Make Fall Risk Sign visible to staff  - Offer Toileting every 2 Hours, in advance of need  - Initiate/Maintain bed alarm  - Obtain necessary fall risk management equipment: alarm/socks  - Apply yellow socks and bracelet for high fall risk patients  - Consider moving patient to room near nurses station  Outcome: Progressing  Goal: Maintain or return to baseline ADL function  Description: INTERVENTIONS:  -  Assess patient's ability to carry out ADLs; assess patient's baseline for ADL function and identify physical deficits which impact ability to perform ADLs (bathing, care of mouth/teeth, toileting, grooming, dressing, etc.)  - Assess/evaluate cause of self-care deficits   - Assess range of motion  - Assess patient's mobility; develop plan if impaired  - Assess patient's need for assistive devices and provide as appropriate  - Encourage maximum independence but intervene and supervise when necessary  - Involve family in performance of ADLs  - Assess for home care needs following discharge   - Consider OT consult to assist with ADL evaluation and planning for discharge  - Provide patient education as appropriate  Outcome: Progressing  Goal: Maintains/Returns to pre admission functional level  Description: INTERVENTIONS:  - Perform BMAT or MOVE assessment daily.   - Set and communicate daily mobility goal to care team and patient/family/caregiver. - Collaborate with rehabilitation services on mobility goals if consulted  - Perform Range of Motion 3 times a day. - Reposition patient every 2 hours.   - Dangle patient 3 times a day  - Stand patient 3 times a day  - Ambulate patient 3 times a day  - Out of bed to chair 3 times a day   - Out of bed for meals 3 times a day  - Out of bed for toileting  - Record patient progress and toleration of activity level   Outcome: Progressing     Problem: DISCHARGE PLANNING  Goal: Discharge to home or other facility with appropriate resources  Description: INTERVENTIONS:  - Identify barriers to discharge w/patient and caregiver  - Arrange for needed discharge resources and transportation as appropriate  - Identify discharge learning needs (meds, wound care, etc.)  - Arrange for interpretive services to assist at discharge as needed  - Refer to Case Management Department for coordinating discharge planning if the patient needs post-hospital services based on physician/advanced practitioner order or complex needs related to functional status, cognitive ability, or social support system  Outcome: Progressing     Problem: Knowledge Deficit  Goal: Patient/family/caregiver demonstrates understanding of disease process, treatment plan, medications, and discharge instructions  Description: Complete learning assessment and assess knowledge base.   Interventions:  - Provide teaching at level of understanding  - Provide teaching via preferred learning methods  Outcome: Progressing

## 2023-08-26 NOTE — ASSESSMENT & PLAN NOTE
· Blood pressure stable  · No currently on Medication regimen.  She reports that she was taken off blood pressure medication on her last admission  · Will monitor blood pressure while admitted - soft on 8/26  · Monitor closely for hypotension and address as needed

## 2023-08-26 NOTE — ASSESSMENT & PLAN NOTE
Presented  to the emergency room with complaints of abdominal pain  Associated with fever, nausea and nonbloody loose stools  Patient was admitted from 8/13-8/16 for jejunal diverticulitis. Was  being treated with PO Augmentin as outpatient  CT shows-1. Interval increased diffuse sigmoid wall thickening with adjacent inflammatory change, most concerning for acute diverticulitis. No fluid collections. 2. Improving inflamed jejunal diverticulum.   Received ceftriaxone and IV Flagyl in the ER  Admit to medsurg  Keep NPO for now  IV fluids  Pain medication PRN  Continue IV Ceftriaxone and IV Flagyl  Monitor vital signs  Am labs  Supportive care

## 2023-08-26 NOTE — ASSESSMENT & PLAN NOTE
Currently stable  Continue as needed Xanax  Monitor while admitted  Continue PCP follow-up post  discharged

## 2023-08-26 NOTE — H&P
[address]  H&P  Name: Amanda Wyman 79 y.o. female I MRN: [id number]  Unit/Bed#:  I Date of Admission: 8/25/2023   Date of Service: 8/25/2023 I Hospital Day: 0      Assessment/Plan   * Sigmoid diverticulitis  Assessment & Plan  Presented  to the emergency room with complaints of abdominal pain  Associated with fever, nausea and nonbloody loose stools  Patient was admitted from 8/13-8/16 for jejunal diverticulitis. Was  being treated with PO Augmentin as outpatient  CT shows-1. Interval increased diffuse sigmoid wall thickening with adjacent inflammatory change, most concerning for acute diverticulitis. No fluid collections. 2. Improving inflamed jejunal diverticulum. Received ceftriaxone and IV Flagyl in the ER  Admit to [facility]  Keep NPO for now  IV fluids  Pain medication PRN  Continue IV Ceftriaxone and IV Flagyl  Monitor vital signs  Am labs  Supportive care          Hypomagnesemia  Assessment & Plan  Magnesium level is 1.5  Received IV magnesium 2 g in the ER  Repeat magnesium levels    Other hyperlipidemia  Assessment & Plan  Continue on simvastatin 40 mg p.o. at bedtime  Will give formulary alternative statin 80 mg p.o. at bedtime  PCP follow up post discharge     [address]  Currently stable  Continue as needed Xanax  Monitor while admitted  Continue PCP follow-up post  discharged    Hypertension  Assessment & Plan  Blood pressure stable  No currently on Medication regimen.  She reports that she was taken off blood pressure medication on her last admission  Will monitor blood pressure while admitted  Continue PCP follow post discharge    Hypothyroidism  Assessment & Plan  Continue levothyroxine    Type 2 diabetes mellitus ([address])  Assessment & Plan  Lab Results   Component Value Date    HGBA1C 6.0 (H) 07/26/2023     History of diabetes type 2  Currently on p.o. insulin regimen  Reports that she is currently diet controlled  Fingerstick glucose 4 times daily AC/HS  Will add correctional insulin if BGL elevated  Diet LEN/CHO controlled   Will monitor while admitted       VTE Pharmacologic Prophylaxis:   Moderate Risk (Score 3-4) - Pharmacological DVT Prophylaxis Ordered: heparin. Code Status: Level 1 - Full Code   Discussion with family: Updated  (son and daughter) at bedside. Anticipated Length of Stay: Patient will be admitted on an observation basis with an anticipated length of stay of less than 2 midnights secondary to Acute sigmoid diverticulitis, hypomagnesemia. Total Time Spent on Date of Encounter in care of patient: 40 minutes This time was spent on one or more of the following: performing physical exam; counseling and coordination of care; obtaining or reviewing history; documenting in the medical record; reviewing/ordering tests, medications or procedures; communicating with other healthcare professionals and discussing with patient's family/caregivers. Chief Complaint: Abdominal pain    History of Present Illness:  Serjio Ortiz is a 79 y.o. female with a PMH of hypertension, diabetes, thyroidism, hyperlipidemia who presents to the emergency room for evaluation of complaint of left lower quadrant and suprapubic abdominal pain. She reports that she did having abdominal pain since yesterday and progressively worse. Pain is associated with nausea and loose nonbloody stool. She did state loose stool has been occurring since her last admission August 13 to August 16, 2023. Patient was admitted during that. For jejunal diverticulitis. She was treated on discharge and is being treated with Augmentin 875/125 3 times daily. Patient denies having any chest pain, shortness of breath, cough, vomiting, dizziness, weakness. She does admit to having a fever of 101.0 Fahrenheit. In the emergency room Included labs significant for WBC of 16.69, magnesium of 1.5 labs otherwise unremarkable.   CT abdomen and pelvis shows interval increase diffuse sigmoid wall thickening with adjacent inflammatory change, most concerning for acute diverticulitis. No fluid collections. The emergency room she received IV ceftriaxone and IV Flagyl. She also received Zofran 4 mg IV. At bedside in ER room 6 patient is awake and alert in no acute distress states that he will has some abdominal discomfort still feels slightly nauseous. Patient is being admitted on observation status [facility] care for further management of acute sigmoid diverticulitis, hypomagnesemia. Review of Systems:  Review of Systems   Constitutional: Positive for fever. Negative for activity change, appetite change and fatigue. HENT: Negative for rhinorrhea, sore throat and trouble swallowing. Respiratory: Negative for cough, chest tightness and shortness of breath. Cardiovascular: Negative for chest pain, palpitations and leg swelling. Gastrointestinal: Positive for abdominal pain and nausea. Negative for constipation, diarrhea and vomiting. Neurological: Negative for dizziness, tremors, syncope, weakness and headaches. Psychiatric/Behavioral: Negative for agitation and confusion. The patient is nervous/anxious. All other systems reviewed and are negative. Past Medical and Surgical History:   Past Medical History:   Diagnosis Date   • Diverticulitis    • Hypertension    • TIA (transient ischemic attack)        Past Surgical History:   Procedure Laterality Date   • COLECTOMY         Meds/Allergies:  Prior to Admission medications    Medication Sig Start Date End Date Taking?  Authorizing Provider   acetaminophen (TYLENOL) 500 mg tablet Take 500 mg by mouth every 4 (four) hours as needed for mild pain, headaches or fever   Yes Historical Provider, MD   amoxicillin-clavulanate (AUGMENTIN) 875-125 mg per tablet Take 1 tablet by mouth 3 (three) times a day for 12 days 8/16/23 8/28/23 Yes Yoselin Burk PA-C   aspirin (ECOTRIN LOW STRENGTH) 81 mg EC tablet Take 81 mg by mouth daily   Yes Historical Provider, MD   hydrochlorothiazide (HYDRODIURIL) 25 mg tablet Take 25 mg by mouth daily   Yes Historical Provider, MD   levothyroxine 88 mcg tablet Take 88 mcg by mouth daily 3/17/23  Yes Historical Provider, MD   ondansetron (ZOFRAN) 4 mg tablet Take 1 tablet (4 mg total) by mouth every 8 (eight) hours as needed for nausea or vomiting 8/5/23  Yes Linn Guzman DO   simvastatin (ZOCOR) 40 mg tablet Take 40 mg by mouth daily at bedtime   Yes Historical Provider, MD   ALPRAZolam Tanika Hein) 0.25 mg tablet Take 0.125-0.25 mg by mouth 2 (two) times a day as needed 7/15/23 1/11/24  Historical Provider, MD   ergocalciferol (ERGOCALCIFEROL) 1.25 MG (21128 UT) capsule Take 50,000 Units by mouth  Patient not taking: Reported on 8/25/2023    Historical Provider, MD     I have reviewed home medications with patient personally. Allergies: Allergies   Allergen Reactions   • Iodine - Food Allergy Hives       Social History:  Marital Status: Single   Occupation: Retired  Patient Pre-hospital Living Situation: Home, Alone  Patient Pre-hospital Level of Mobility: walks  Patient Pre-hospital Diet Restrictions: None reported  Substance Use History:   Social History     Substance and Sexual Activity   Alcohol Use Never     Social History     Tobacco Use   Smoking Status Every Day   • Packs/day: 0.25   • Types: Cigarettes   Smokeless Tobacco Never     Social History     Substance and Sexual Activity   Drug Use Never       Family History:  History reviewed. No pertinent family history. Physical Exam:     Vitals:   Blood Pressure: 125/60 (08/25/23 2115)  Pulse: 78 (08/25/23 2115)  Temperature: 99.6 °F (37.6 °C) (08/25/23 1751)  Temp Source: Temporal (08/25/23 1751)  Respirations: 16 (08/25/23 2115)  SpO2: 90 % (08/25/23 2115)    Physical Exam  Constitutional:       General: She is not in acute distress. Appearance: She is not ill-appearing or diaphoretic.    HENT:      Head: Normocephalic and atraumatic. Mouth/Throat:      Mouth: Mucous membranes are moist.      Pharynx: Oropharynx is clear. Cardiovascular:      Rate and Rhythm: Normal rate and regular rhythm. Pulses: Normal pulses. Pulmonary:      Effort: No respiratory distress. Abdominal:      General: There is no distension. Palpations: Abdomen is soft. There is no mass. Tenderness: There is abdominal tenderness. Musculoskeletal:      Right lower leg: No edema. Left lower leg: No edema. Skin:     General: Skin is warm and dry. Capillary Refill: Capillary refill takes less than 2 seconds. Coloration: Skin is not jaundiced or pale. Findings: No bruising or erythema. Neurological:      Mental Status: She is alert and oriented to person, place, and time. Psychiatric:         Mood and Affect: Mood normal.          Additional Data:     Lab Results:  Results from last 7 days   Lab Units 08/25/23  1814   WBC Thousand/uL 16.69*   HEMOGLOBIN g/dL 14.4   HEMATOCRIT % 44.4   PLATELETS Thousands/uL 340   NEUTROS PCT % 90*   LYMPHS PCT % 5*   MONOS PCT % 5   EOS PCT % 0     Results from last 7 days   Lab Units 08/25/23  1814   SODIUM mmol/L 135   POTASSIUM mmol/L 3.5   CHLORIDE mmol/L 98   CO2 mmol/L 26   BUN mg/dL 11   CREATININE mg/dL 0.81   ANION GAP mmol/L 11   CALCIUM mg/dL 9.4   ALBUMIN g/dL 3.9   TOTAL BILIRUBIN mg/dL 0.89   ALK PHOS U/L 68   ALT U/L 10   AST U/L 14   GLUCOSE RANDOM mg/dL 128                 Results from last 7 days   Lab Units 08/25/23  1814   LACTIC ACID mmol/L 0.8       Lines/Drains:  Invasive Devices     Peripheral Intravenous Line  Duration           Peripheral IV 08/13/23 Right Antecubital 12 days                    Imaging: Reviewed radiology reports from this admission including: abdominal/pelvic CT  CT abdomen pelvis wo contrast   Final Result by Rebeka Carr MD (08/25 1957)   1.  Interval increased diffuse sigmoid wall thickening with adjacent inflammatory change, most concerning for acute diverticulitis. No fluid collections. 2. Improving inflamed jejunal diverticulum. Workstation performed: [id number]             EKG and Other Studies Reviewed on Admission:   · EKG: No EKG obtained. ** Please Note: This note has been constructed using a voice recognition system.  **

## 2023-08-26 NOTE — ASSESSMENT & PLAN NOTE
Blood pressure stable  No currently on Medication regimen.  She reports that she was taken off blood pressure medication on her last admission  Will monitor blood pressure while admitted  Continue PCP follow post discharge

## 2023-08-27 PROBLEM — E87.6 HYPOKALEMIA: Status: ACTIVE | Noted: 2023-08-27

## 2023-08-27 PROBLEM — R19.7 DIARRHEA: Status: ACTIVE | Noted: 2023-08-27

## 2023-08-27 PROBLEM — E16.2 HYPOGLYCEMIA: Status: ACTIVE | Noted: 2023-08-27

## 2023-08-27 PROBLEM — E11.649 TYPE 2 DIABETES MELLITUS WITH HYPOGLYCEMIA WITHOUT COMA, WITHOUT LONG-TERM CURRENT USE OF INSULIN (HCC): Status: ACTIVE | Noted: 2023-08-13

## 2023-08-27 LAB
ALBUMIN SERPL BCP-MCNC: 3.4 G/DL (ref 3.5–5)
ALP SERPL-CCNC: 61 U/L (ref 34–104)
ALT SERPL W P-5'-P-CCNC: 9 U/L (ref 7–52)
ANION GAP SERPL CALCULATED.3IONS-SCNC: 10 MMOL/L
AST SERPL W P-5'-P-CCNC: 12 U/L (ref 13–39)
BASOPHILS # BLD AUTO: 0.01 THOUSANDS/ÂΜL (ref 0–0.1)
BASOPHILS NFR BLD AUTO: 0 % (ref 0–1)
BILIRUB SERPL-MCNC: 0.67 MG/DL (ref 0.2–1)
BUN SERPL-MCNC: 10 MG/DL (ref 5–25)
C DIFF TOX A+B STL QL IA: POSITIVE
C DIFF TOX GENS STL QL NAA+PROBE: POSITIVE
CALCIUM ALBUM COR SERPL-MCNC: 9.3 MG/DL (ref 8.3–10.1)
CALCIUM SERPL-MCNC: 8.8 MG/DL (ref 8.4–10.2)
CAMPYLOBACTER DNA SPEC NAA+PROBE: NORMAL
CHLORIDE SERPL-SCNC: 104 MMOL/L (ref 96–108)
CO2 SERPL-SCNC: 25 MMOL/L (ref 21–32)
CREAT SERPL-MCNC: 0.81 MG/DL (ref 0.6–1.3)
EOSINOPHIL # BLD AUTO: 0.08 THOUSAND/ÂΜL (ref 0–0.61)
EOSINOPHIL NFR BLD AUTO: 1 % (ref 0–6)
ERYTHROCYTE [DISTWIDTH] IN BLOOD BY AUTOMATED COUNT: 14.6 % (ref 11.6–15.1)
FLUAV RNA RESP QL NAA+PROBE: NEGATIVE
FLUBV RNA RESP QL NAA+PROBE: NEGATIVE
GFR SERPL CREATININE-BSD FRML MDRD: 67 ML/MIN/1.73SQ M
GLUCOSE SERPL-MCNC: 100 MG/DL (ref 65–140)
GLUCOSE SERPL-MCNC: 142 MG/DL (ref 65–140)
GLUCOSE SERPL-MCNC: 179 MG/DL (ref 65–140)
GLUCOSE SERPL-MCNC: 64 MG/DL (ref 65–140)
GLUCOSE SERPL-MCNC: 81 MG/DL (ref 65–140)
HCT VFR BLD AUTO: 39.3 % (ref 34.8–46.1)
HGB BLD-MCNC: 12.4 G/DL (ref 11.5–15.4)
IMM GRANULOCYTES # BLD AUTO: 0.01 THOUSAND/UL (ref 0–0.2)
IMM GRANULOCYTES NFR BLD AUTO: 0 % (ref 0–2)
LYMPHOCYTES # BLD AUTO: 0.9 THOUSANDS/ÂΜL (ref 0.6–4.47)
LYMPHOCYTES NFR BLD AUTO: 15 % (ref 14–44)
MAGNESIUM SERPL-MCNC: 1.7 MG/DL (ref 1.9–2.7)
MCH RBC QN AUTO: 28.7 PG (ref 26.8–34.3)
MCHC RBC AUTO-ENTMCNC: 31.6 G/DL (ref 31.4–37.4)
MCV RBC AUTO: 91 FL (ref 82–98)
MONOCYTES # BLD AUTO: 0.53 THOUSAND/ÂΜL (ref 0.17–1.22)
MONOCYTES NFR BLD AUTO: 9 % (ref 4–12)
NEUTROPHILS # BLD AUTO: 4.51 THOUSANDS/ÂΜL (ref 1.85–7.62)
NEUTS SEG NFR BLD AUTO: 75 % (ref 43–75)
NRBC BLD AUTO-RTO: 0 /100 WBCS
PHOSPHATE SERPL-MCNC: 2.5 MG/DL (ref 2.3–4.1)
PLATELET # BLD AUTO: 289 THOUSANDS/UL (ref 149–390)
PMV BLD AUTO: 10.3 FL (ref 8.9–12.7)
POTASSIUM SERPL-SCNC: 3.9 MMOL/L (ref 3.5–5.3)
PROT SERPL-MCNC: 6.2 G/DL (ref 6.4–8.4)
RBC # BLD AUTO: 4.32 MILLION/UL (ref 3.81–5.12)
RSV RNA RESP QL NAA+PROBE: NEGATIVE
RV AG STL QL: NEGATIVE
SALMONELLA DNA SPEC QL NAA+PROBE: NORMAL
SARS-COV-2 RNA RESP QL NAA+PROBE: NEGATIVE
SHIGA TOXIN STX GENE SPEC NAA+PROBE: NORMAL
SHIGELLA DNA SPEC QL NAA+PROBE: NORMAL
SODIUM SERPL-SCNC: 139 MMOL/L (ref 135–147)
WBC # BLD AUTO: 6.04 THOUSAND/UL (ref 4.31–10.16)

## 2023-08-27 PROCEDURE — 84100 ASSAY OF PHOSPHORUS: CPT | Performed by: INTERNAL MEDICINE

## 2023-08-27 PROCEDURE — 80053 COMPREHEN METABOLIC PANEL: CPT | Performed by: INTERNAL MEDICINE

## 2023-08-27 PROCEDURE — 99232 SBSQ HOSP IP/OBS MODERATE 35: CPT | Performed by: INTERNAL MEDICINE

## 2023-08-27 PROCEDURE — 0241U HB NFCT DS VIR RESP RNA 4 TRGT: CPT | Performed by: INTERNAL MEDICINE

## 2023-08-27 PROCEDURE — 83735 ASSAY OF MAGNESIUM: CPT | Performed by: INTERNAL MEDICINE

## 2023-08-27 PROCEDURE — 85025 COMPLETE CBC W/AUTO DIFF WBC: CPT | Performed by: INTERNAL MEDICINE

## 2023-08-27 PROCEDURE — 82948 REAGENT STRIP/BLOOD GLUCOSE: CPT

## 2023-08-27 RX ORDER — DEXTROSE, SODIUM CHLORIDE, AND POTASSIUM CHLORIDE 5; .2; .15 G/100ML; G/100ML; G/100ML
75 INJECTION INTRAVENOUS CONTINUOUS
Status: DISCONTINUED | OUTPATIENT
Start: 2023-08-27 | End: 2023-08-27

## 2023-08-27 RX ORDER — MAGNESIUM SULFATE HEPTAHYDRATE 40 MG/ML
2 INJECTION, SOLUTION INTRAVENOUS ONCE
Status: COMPLETED | OUTPATIENT
Start: 2023-08-27 | End: 2023-08-27

## 2023-08-27 RX ORDER — DEXTROSE MONOHYDRATE 25 G/50ML
25 INJECTION, SOLUTION INTRAVENOUS DAILY PRN
Status: DISCONTINUED | OUTPATIENT
Start: 2023-08-27 | End: 2023-09-06 | Stop reason: HOSPADM

## 2023-08-27 RX ORDER — DEXTROSE MONOHYDRATE, SODIUM CHLORIDE, AND POTASSIUM CHLORIDE 50; 1.49; 9 G/1000ML; G/1000ML; G/1000ML
75 INJECTION, SOLUTION INTRAVENOUS CONTINUOUS
Status: DISCONTINUED | OUTPATIENT
Start: 2023-08-27 | End: 2023-09-01

## 2023-08-27 RX ADMIN — DEXTROSE MONOHYDRATE 25 ML: 25 INJECTION, SOLUTION INTRAVENOUS at 14:48

## 2023-08-27 RX ADMIN — LEVOTHYROXINE SODIUM 88 MCG: 88 TABLET ORAL at 05:01

## 2023-08-27 RX ADMIN — TRIMETHOBENZAMIDE HYDROCHLORIDE 200 MG: 100 INJECTION INTRAMUSCULAR at 05:52

## 2023-08-27 RX ADMIN — MAGNESIUM SULFATE HEPTAHYDRATE 2 G: 40 INJECTION, SOLUTION INTRAVENOUS at 17:56

## 2023-08-27 RX ADMIN — ALPRAZOLAM 0.25 MG: 0.25 TABLET ORAL at 21:54

## 2023-08-27 RX ADMIN — SODIUM CHLORIDE 75 ML/HR: 0.9 INJECTION, SOLUTION INTRAVENOUS at 12:17

## 2023-08-27 RX ADMIN — HEPARIN SODIUM 5000 UNITS: 5000 INJECTION INTRAVENOUS; SUBCUTANEOUS at 21:53

## 2023-08-27 RX ADMIN — HEPARIN SODIUM 5000 UNITS: 5000 INJECTION INTRAVENOUS; SUBCUTANEOUS at 13:28

## 2023-08-27 RX ADMIN — HEPARIN SODIUM 5000 UNITS: 5000 INJECTION INTRAVENOUS; SUBCUTANEOUS at 05:01

## 2023-08-27 RX ADMIN — ASPIRIN 81 MG: 81 TABLET, COATED ORAL at 09:23

## 2023-08-27 RX ADMIN — DEXTROSE, SODIUM CHLORIDE, AND POTASSIUM CHLORIDE 75 ML/HR: 5; .9; .15 INJECTION INTRAVENOUS at 14:48

## 2023-08-27 RX ADMIN — PIPERACILLIN AND TAZOBACTAM 3.38 G: 3; .375 INJECTION, POWDER, FOR SOLUTION INTRAVENOUS at 16:37

## 2023-08-27 RX ADMIN — PIPERACILLIN AND TAZOBACTAM 3.38 G: 3; .375 INJECTION, POWDER, FOR SOLUTION INTRAVENOUS at 23:29

## 2023-08-27 RX ADMIN — PIPERACILLIN AND TAZOBACTAM 3.38 G: 3; .375 INJECTION, POWDER, FOR SOLUTION INTRAVENOUS at 11:26

## 2023-08-27 RX ADMIN — PIPERACILLIN AND TAZOBACTAM 3.38 G: 3; .375 INJECTION, POWDER, FOR SOLUTION INTRAVENOUS at 04:51

## 2023-08-27 RX ADMIN — PRAVASTATIN SODIUM 80 MG: 40 TABLET ORAL at 16:37

## 2023-08-27 NOTE — PROGRESS NOTES
[address]  Progress Note  Name: Jose Edwards  MRN: [id number]  Unit/Bed#: 610-29 I Date of Admission: 8/25/2023   Date of Service: 8/27/2023 I Hospital Day: 1    Assessment/Plan   * Sigmoid diverticulitis  Assessment & Plan  · Presented to the emergency room with complaints of abdominal pain  · Associated with fever, nausea, and non-bloody loose stools  · Patient was admitted from 8/13-8/16 for jejunal diverticulitis. Was being treated with PO Augmentin as outpatient  · CT shows-1. Interval increased diffuse sigmoid wall thickening with adjacent inflammatory change, most concerning for acute diverticulitis. No fluid collections. 2. Improving inflamed jejunal diverticulum. · Received ceftriaxone and IV Flagyl in the ER - with complaints of nausea with flagyl switch to IV Zosyn  · Initially kept NPO  · Advance to a clear liquid diet on 08/27/2023  · IV fluids to be continued  · Pain medication PRN  · Check stool studies  · May require a General Surgery evaluation during this hospitalization  · The patient has refused surgery for the recurrent diverticulitis in the past.    Diarrhea  Assessment & Plan  · Check stool cultures including a Clostridium difficile culture, a Rotavirus stool antigen test, a stool culture for enteric bacterial pathogens, and stool for fecal leukocytes.       Hypokalemia  Assessment & Plan  · Utilize D5 NSS IV fluids with 20 mEq KCl at 75 ml/hr  · Follow the potassium level and magnesium level    Results from last 7 days   Lab Units 08/26/23  0556 08/25/23  1814   SODIUM mmol/L 137 135   POTASSIUM mmol/L 3.1* 3.5   CHLORIDE mmol/L 101 98   CO2 mmol/L 26 26   BUN mg/dL 9 11   CREATININE mg/dL 0.87 0.81   CALCIUM mg/dL 8.8 9.4         Other hyperlipidemia  Assessment & Plan  · Continue on simvastatin 40 mg p.o. at bedtime  · Will give formulary alternative pravastatin 80 mg p.o. at bedtime  · PCP follow-up post discharge     Adrenal adenoma, left  Assessment & Plan  · Outpatient work-up and surveillance imaging with PCP    Hypomagnesemia  Assessment & Plan  · Follow the magnesium level    Results from last 7 days   Lab Units 23  0556 23  1814   MAGNESIUM mg/dL 1.8* 1.5*         Hypothyroidism  Assessment & Plan  · Check a TSH level  · Continue levothyroxine    Type 2 diabetes mellitus with hypoglycemia without coma, without long-term current use of insulin (HCC)  Assessment & Plan  Lab Results   Component Value Date    HGBA1C 6.0 (H) 2023       (P) 97     · Diet-controlled  · Developed hypoglycemia on 2023  · Hypoglycemia protocol  · ISS with blood glucose monitoring        VTE Pharmacologic Prophylaxis: VTE Score: 5 High Risk (Score >/= 5) - Pharmacological DVT Prophylaxis Ordered: heparin. Sequential Compression Devices Ordered. Patient Centered Rounds: I performed bedside rounds with nursing staff today. Total Time Spent on Date of Encounter in care of patient: 35 minutes This time was spent on one or more of the following: performing physical exam; counseling and coordination of care; obtaining or reviewing history; documenting in the medical record; reviewing/ordering tests, medications or procedures; communicating with other healthcare professionals and discussing with patient's family/caregivers. Current Length of Stay: 1 day(s)  Current Patient Status: Inpatient   Certification Statement: The patient will continue to require additional inpatient hospital stay due to the need for IV antibiotic treatment, for continuous IV fluids, and to monitor the patient on a clear liquid diet. Discharge Plan: Anticipate discharge in 48-72 hrs to home with home services. Code Status: Level 1 - Full Code    Subjective: The patient was seen and examined. The patient continues to experience generalized abdominal pain, nausea, and diarrhea.       Objective:     Vitals:   Temp (24hrs), Av.8 °F (37.1 °C), Min:97.9 °F (36.6 °C), Max:100.2 °F (37.9 °C)    Temp:  [97.9 °F (36.6 °C)-100.2 °F (37.9 °C)] 97.9 °F (36.6 °C)  HR:  [71-77] 72  Resp:  [14-20] 14  BP: (101-129)/(50-69) 129/69  SpO2:  [92 %-94 %] 94 %  Body mass index is 28.35 kg/m². Input and Output Summary (last 24 hours): Intake/Output Summary (Last 24 hours) at 8/27/2023 1438  Last data filed at 8/27/2023 1217  Gross per 24 hour   Intake 1000 ml   Output --   Net 1000 ml       Physical Exam:   Physical Exam   General:  NAD, follows commands  HEENT:  NC/AT, mucous membranes dry  Neck:  Supple, No JVP elevation  CV:  + S1, + S2, RRR  Pulm:  Lung fields are CTA bilaterally  Abd:  Soft, Left lower quadrant abdominal tenderness with palpation, Mild distension  Ext:  No clubbing/cyanosis/edema  Skin:  No rashes  Neuro:  Awake, alert, oriented  Psych:  Normal mood and affect      Additional Data:    Labs:  Results from last 7 days   Lab Units 08/26/23  0455   WBC Thousand/uL 14.24*   HEMOGLOBIN g/dL 12.3   HEMATOCRIT % 37.1   PLATELETS Thousands/uL 270   NEUTROS PCT % 85*   LYMPHS PCT % 7*   MONOS PCT % 7   EOS PCT % 0     Results from last 7 days   Lab Units 08/26/23  0556   SODIUM mmol/L 137   POTASSIUM mmol/L 3.1*   CHLORIDE mmol/L 101   CO2 mmol/L 26   BUN mg/dL 9   CREATININE mg/dL 0.87   ANION GAP mmol/L 10   CALCIUM mg/dL 8.8   ALBUMIN g/dL 3.3*   TOTAL BILIRUBIN mg/dL 0.81   ALK PHOS U/L 57   ALT U/L 8   AST U/L 11*   GLUCOSE RANDOM mg/dL 123         Results from last 7 days   Lab Units 08/27/23  1405 08/27/23  0021 08/26/23  1907 08/26/23  1102 08/26/23  0700   POC GLUCOSE mg/dl 64* 100 89 114 118         Results from last 7 days   Lab Units 08/25/23  1814   LACTIC ACID mmol/L 0.8       Lines/Drains:  Invasive Devices     Peripheral Intravenous Line  Duration           Peripheral IV 08/25/23 Proximal;Right;Ventral (anterior) Forearm 1 day    Peripheral IV 08/26/23 Dorsal (posterior); Right Forearm 1 day                      Imaging: No pertinent imaging reviewed.     Recent Cultures (last 7 days):         Last 24 Hours Medication List:   Current Facility-Administered Medications   Medication Dose Route Frequency Provider Last Rate   • acetaminophen  650 mg Oral Q6H PRN Shaun Calvillo PA-C     • ALPRAZolam  0.25 mg Oral BID PRN Linda Olivia MD     • aspirin  81 mg Oral Daily Shaun Calvillo PA-C     • dextrose 5 % and sodium chloride 0.9 % with KCl 20 mEq/L  75 mL/hr Intravenous Continuous Kirsten Aldana DO     • dextrose  25 mL Intravenous Daily PRN Kirsten Aldana DO     • heparin (porcine)  5,000 Units Subcutaneous Q8H [facility] Shaun Calvillo PA-C     • levothyroxine  88 mcg Oral Early Morning Shaun Calvillo PA-C     • nicotine  1 patch Transdermal Daily Shaun Calvillo PA-C     • piperacillin-tazobactam  3.375 g Intravenous Q6H Negel KIM VidalC 3.375 g (08/27/23 1126)   • pravastatin  80 mg Oral Daily With Dinner Shaun Calvillo PA-C     • trimethobenzamide  200 mg Intramuscular Q6H PRN Toro Vidal PA-C          Today, Patient Was Seen By: Karen Frazier DO    **Please Note: This note may have been constructed using a voice recognition system. **

## 2023-08-27 NOTE — ASSESSMENT & PLAN NOTE
· Utilize D5 NSS IV fluids with 20 mEq KCl at 75 ml/hr  · Follow the potassium level and magnesium level    Results from last 7 days   Lab Units 08/26/23  0556 08/25/23  1814   SODIUM mmol/L 137 135   POTASSIUM mmol/L 3.1* 3.5   CHLORIDE mmol/L 101 98   CO2 mmol/L 26 26   BUN mg/dL 9 11   CREATININE mg/dL 0.87 0.81   CALCIUM mg/dL 8.8 9.4

## 2023-08-27 NOTE — ASSESSMENT & PLAN NOTE
· Check stool cultures including a Clostridium difficile culture, a Rotavirus stool antigen test, a stool culture for enteric bacterial pathogens, and stool for fecal leukocytes.

## 2023-08-27 NOTE — PHYSICAL THERAPY NOTE
PHYSICAL THERAPY NOTE      Patient Name: Christianne BANKS Date: 8/27/2023 08/27/23 0725   PT Last Visit   PT Visit Date 08/27/23   Note Type   Note type Screen     PT orders received and chart reviewed. Pt is I at baseline and continues to function at I level. She does not require skilled PT services at this time. PT to discharge pt at this time and will be available for future consult if needed.     Jan Estevez, PT

## 2023-08-27 NOTE — ASSESSMENT & PLAN NOTE
Lab Results   Component Value Date    HGBA1C 6.0 (H) 07/26/2023       (P) 97     · Diet-controlled  · Developed hypoglycemia on 08/27/2023  · Hypoglycemia protocol  · ISS with blood glucose monitoring

## 2023-08-27 NOTE — ASSESSMENT & PLAN NOTE
· Presented to the emergency room with complaints of abdominal pain  · Associated with fever, nausea, and non-bloody loose stools  · Patient was admitted from 8/13-8/16 for jejunal diverticulitis. Was being treated with PO Augmentin as outpatient  · CT shows-1. Interval increased diffuse sigmoid wall thickening with adjacent inflammatory change, most concerning for acute diverticulitis. No fluid collections. 2. Improving inflamed jejunal diverticulum.   · Received ceftriaxone and IV Flagyl in the ER - with complaints of nausea with flagyl switch to IV Zosyn  · Initially kept NPO  · Advance to a clear liquid diet on 08/27/2023  · IV fluids to be continued  · Pain medication PRN  · Check stool studies  · May require a General Surgery evaluation during this hospitalization  · The patient has refused surgery for the recurrent diverticulitis in the past.

## 2023-08-27 NOTE — PLAN OF CARE
Problem: PAIN - ADULT  Goal: Verbalizes/displays adequate comfort level or baseline comfort level  Description: Interventions:  - Encourage patient to monitor pain and request assistance  - Assess pain using appropriate pain scale  - Administer analgesics based on type and severity of pain and evaluate response  - Implement non-pharmacological measures as appropriate and evaluate response  - Consider cultural and social influences on pain and pain management  - Notify physician/advanced practitioner if interventions unsuccessful or patient reports new pain  Outcome: Progressing     Problem: INFECTION - ADULT  Goal: Absence or prevention of progression during hospitalization  Description: INTERVENTIONS:  - Assess and monitor for signs and symptoms of infection  - Monitor lab/diagnostic results  - Monitor all insertion sites, i.e. indwelling lines, tubes, and drains  - Monitor endotracheal if appropriate and nasal secretions for changes in amount and color  - Alda appropriate cooling/warming therapies per order  - Administer medications as ordered  - Instruct and encourage patient and family to use good hand hygiene technique  - Identify and instruct in appropriate isolation precautions for identified infection/condition  Outcome: Progressing  Goal: Absence of fever/infection during neutropenic period  Description: INTERVENTIONS:  - Monitor WBC    Outcome: Progressing     Problem: SAFETY ADULT  Goal: Patient will remain free of falls  Description: INTERVENTIONS:  - Educate patient/family on patient safety including physical limitations  - Instruct patient to call for assistance with activity   - Consult OT/PT to assist with strengthening/mobility   - Keep Call bell within reach  - Keep bed low and locked with side rails adjusted as appropriate  - Keep care items and personal belongings within reach  - Initiate and maintain comfort rounds  - Make Fall Risk Sign visible to staff  - Apply yellow socks and bracelet for high fall risk patients  - Consider moving patient to room near nurses station  Outcome: Progressing  Goal: Maintain or return to baseline ADL function  Description: INTERVENTIONS:  -  Assess patient's ability to carry out ADLs; assess patient's baseline for ADL function and identify physical deficits which impact ability to perform ADLs (bathing, care of mouth/teeth, toileting, grooming, dressing, etc.)  - Assess/evaluate cause of self-care deficits   - Assess range of motion  - Assess patient's mobility; develop plan if impaired  - Assess patient's need for assistive devices and provide as appropriate  - Encourage maximum independence but intervene and supervise when necessary  - Involve family in performance of ADLs  - Assess for home care needs following discharge   - Consider OT consult to assist with ADL evaluation and planning for discharge  - Provide patient education as appropriate  Outcome: Progressing  Goal: Maintains/Returns to pre admission functional level  Description: INTERVENTIONS:  - Perform BMAT or MOVE assessment daily.   - Set and communicate daily mobility goal to care team and patient/family/caregiver.    - Collaborate with rehabilitation services on mobility goals if consulted  - Out of bed for toileting  - Record patient progress and toleration of activity level   Outcome: Progressing     Problem: DISCHARGE PLANNING  Goal: Discharge to home or other facility with appropriate resources  Description: INTERVENTIONS:  - Identify barriers to discharge w/patient and caregiver  - Arrange for needed discharge resources and transportation as appropriate  - Identify discharge learning needs (meds, wound care, etc.)  - Arrange for interpretive services to assist at discharge as needed  - Refer to Case Management Department for coordinating discharge planning if the patient needs post-hospital services based on physician/advanced practitioner order or complex needs related to functional status, cognitive ability, or social support system  Outcome: Progressing     Problem: Knowledge Deficit  Goal: Patient/family/caregiver demonstrates understanding of disease process, treatment plan, medications, and discharge instructions  Description: Complete learning assessment and assess knowledge base.   Interventions:  - Provide teaching at level of understanding  - Provide teaching via preferred learning methods  Outcome: Progressing

## 2023-08-27 NOTE — ASSESSMENT & PLAN NOTE
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

## 2023-08-27 NOTE — PLAN OF CARE
Problem: PAIN - ADULT  Goal: Verbalizes/displays adequate comfort level or baseline comfort level  Description: Interventions:  - Encourage patient to monitor pain and request assistance  - Assess pain using appropriate pain scale  - Administer analgesics based on type and severity of pain and evaluate response  - Implement non-pharmacological measures as appropriate and evaluate response  - Consider cultural and social influences on pain and pain management  - Notify physician/advanced practitioner if interventions unsuccessful or patient reports new pain  Outcome: Progressing     Problem: INFECTION - ADULT  Goal: Absence or prevention of progression during hospitalization  Description: INTERVENTIONS:  - Assess and monitor for signs and symptoms of infection  - Monitor lab/diagnostic results  - Monitor all insertion sites, i.e. indwelling lines, tubes, and drains  - Monitor endotracheal if appropriate and nasal secretions for changes in amount and color  - [place] appropriate cooling/warming therapies per order  - Administer medications as ordered  - Instruct and encourage patient and family to use good hand hygiene technique  - Identify and instruct in appropriate isolation precautions for identified infection/condition  Outcome: Progressing  Goal: Absence of fever/infection during neutropenic period  Description: INTERVENTIONS:  - Monitor WBC    Outcome: Progressing     Problem: SAFETY ADULT  Goal: Patient will remain free of falls  Description: INTERVENTIONS:  - Educate patient/family on patient safety including physical limitations  - Instruct patient to call for assistance with activity   - Consult OT/PT to assist with strengthening/mobility   - Keep Call bell within reach  - Keep bed low and locked with side rails adjusted as appropriate  - Keep care items and personal belongings within reach  - Initiate and maintain comfort rounds  - Make Fall Risk Sign visible to staff  - Offer Toileting every 2 Hours, in advance of need  - Initiate/Maintain bed/ chair alarm  - Apply yellow socks and bracelet for high fall risk patients  - Consider moving patient to room near nurses station  Outcome: Progressing  Goal: Maintain or return to baseline ADL function  Description: INTERVENTIONS:  -  Assess patient's ability to carry out ADLs; assess patient's baseline for ADL function and identify physical deficits which impact ability to perform ADLs (bathing, care of mouth/teeth, toileting, grooming, dressing, etc.)  - Assess/evaluate cause of self-care deficits   - Assess range of motion  - Assess patient's mobility; develop plan if impaired  - Assess patient's need for assistive devices and provide as appropriate  - Encourage maximum independence but intervene and supervise when necessary  - Involve family in performance of ADLs  - Assess for home care needs following discharge   - Consider OT consult to assist with ADL evaluation and planning for discharge  - Provide patient education as appropriate  Outcome: Progressing  Goal: Maintains/Returns to pre admission functional level  Description: INTERVENTIONS:  - Perform BMAT or MOVE assessment daily.   - Set and communicate daily mobility goal to care team and patient/family/caregiver. - Collaborate with rehabilitation services on mobility goals if consulted  - Perform Range of Motion 3-4 times a day. - Reposition patient every 2 hours.   - Dangle patient 3 times a day  - Stand patient 3 times a day  - Ambulate patient 3 times a day  - Out of bed to chair 3 times a day   - Out of bed for meals 3 times a day  - Out of bed for toileting  - Record patient progress and toleration of activity level   Outcome: Progressing     Problem: DISCHARGE PLANNING  Goal: Discharge to home or other facility with appropriate resources  Description: INTERVENTIONS:  - Identify barriers to discharge w/patient and caregiver  - Arrange for needed discharge resources and transportation as appropriate  - Identify discharge learning needs (meds, wound care, etc.)  - Arrange for interpretive services to assist at discharge as needed  - Refer to Case Management Department for coordinating discharge planning if the patient needs post-hospital services based on physician/advanced practitioner order or complex needs related to functional status, cognitive ability, or social support system  Outcome: Progressing     Problem: Knowledge Deficit  Goal: Patient/family/caregiver demonstrates understanding of disease process, treatment plan, medications, and discharge instructions  Description: Complete learning assessment and assess knowledge base.   Interventions:  - Provide teaching at level of understanding  - Provide teaching via preferred learning methods  Outcome: Progressing     Problem: GASTROINTESTINAL - ADULT  Goal: Minimal or absence of nausea and/or vomiting  Description: INTERVENTIONS:  - Administer IV fluids if ordered to ensure adequate hydration  - Maintain NPO status until nausea and vomiting are resolved  - Nasogastric tube if ordered  - Administer ordered antiemetic medications as needed  - Provide nonpharmacologic comfort measures as appropriate  - Advance diet as tolerated, if ordered  - Consider nutrition services referral to assist patient with adequate nutrition and appropriate food choices  Outcome: Progressing  Goal: Maintains or returns to baseline bowel function  Description: INTERVENTIONS:  - Assess bowel function  - Encourage oral fluids to ensure adequate hydration  - Administer IV fluids if ordered to ensure adequate hydration  - Administer ordered medications as needed  - Encourage mobilization and activity  - Consider nutritional services referral to assist patient with adequate nutrition and appropriate food choices  Outcome: Progressing

## 2023-08-27 NOTE — ASSESSMENT & PLAN NOTE
· Continue on simvastatin 40 mg p.o. at bedtime  · Will give formulary alternative pravastatin 80 mg p.o. at bedtime  · PCP follow-up post discharge

## 2023-08-28 LAB
ALBUMIN SERPL BCP-MCNC: 3 G/DL (ref 3.5–5)
ALP SERPL-CCNC: 53 U/L (ref 34–104)
ALT SERPL W P-5'-P-CCNC: 7 U/L (ref 7–52)
ANION GAP SERPL CALCULATED.3IONS-SCNC: 5 MMOL/L
AST SERPL W P-5'-P-CCNC: 10 U/L (ref 13–39)
BASOPHILS # BLD AUTO: 0.03 THOUSANDS/ÂΜL (ref 0–0.1)
BASOPHILS NFR BLD AUTO: 1 % (ref 0–1)
BILIRUB SERPL-MCNC: 0.48 MG/DL (ref 0.2–1)
BUN SERPL-MCNC: 7 MG/DL (ref 5–25)
CALCIUM ALBUM COR SERPL-MCNC: 9.3 MG/DL (ref 8.3–10.1)
CALCIUM SERPL-MCNC: 8.5 MG/DL (ref 8.4–10.2)
CHLORIDE SERPL-SCNC: 107 MMOL/L (ref 96–108)
CO2 SERPL-SCNC: 27 MMOL/L (ref 21–32)
CREAT SERPL-MCNC: 0.67 MG/DL (ref 0.6–1.3)
EOSINOPHIL # BLD AUTO: 0.14 THOUSAND/ÂΜL (ref 0–0.61)
EOSINOPHIL NFR BLD AUTO: 3 % (ref 0–6)
ERYTHROCYTE [DISTWIDTH] IN BLOOD BY AUTOMATED COUNT: 14.5 % (ref 11.6–15.1)
EST. AVERAGE GLUCOSE BLD GHB EST-MCNC: 126 MG/DL
GFR SERPL CREATININE-BSD FRML MDRD: 81 ML/MIN/1.73SQ M
GLUCOSE SERPL-MCNC: 102 MG/DL (ref 65–140)
GLUCOSE SERPL-MCNC: 128 MG/DL (ref 65–140)
GLUCOSE SERPL-MCNC: 153 MG/DL (ref 65–140)
GLUCOSE SERPL-MCNC: 90 MG/DL (ref 65–140)
GLUCOSE SERPL-MCNC: 99 MG/DL (ref 65–140)
HBA1C MFR BLD: 6 %
HCT VFR BLD AUTO: 34 % (ref 34.8–46.1)
HGB BLD-MCNC: 11.1 G/DL (ref 11.5–15.4)
IMM GRANULOCYTES # BLD AUTO: 0.01 THOUSAND/UL (ref 0–0.2)
IMM GRANULOCYTES NFR BLD AUTO: 0 % (ref 0–2)
LACTATE SERPL-SCNC: 0.8 MMOL/L (ref 0.5–2)
LYMPHOCYTES # BLD AUTO: 0.78 THOUSANDS/ÂΜL (ref 0.6–4.47)
LYMPHOCYTES NFR BLD AUTO: 18 % (ref 14–44)
MAGNESIUM SERPL-MCNC: 2 MG/DL (ref 1.9–2.7)
MCH RBC QN AUTO: 28.9 PG (ref 26.8–34.3)
MCHC RBC AUTO-ENTMCNC: 32.6 G/DL (ref 31.4–37.4)
MCV RBC AUTO: 89 FL (ref 82–98)
MONOCYTES # BLD AUTO: 0.5 THOUSAND/ÂΜL (ref 0.17–1.22)
MONOCYTES NFR BLD AUTO: 11 % (ref 4–12)
NEUTROPHILS # BLD AUTO: 2.92 THOUSANDS/ÂΜL (ref 1.85–7.62)
NEUTS SEG NFR BLD AUTO: 67 % (ref 43–75)
NRBC BLD AUTO-RTO: 0 /100 WBCS
PHOSPHATE SERPL-MCNC: 2.1 MG/DL (ref 2.3–4.1)
PLATELET # BLD AUTO: 245 THOUSANDS/UL (ref 149–390)
PMV BLD AUTO: 10 FL (ref 8.9–12.7)
POTASSIUM SERPL-SCNC: 3.4 MMOL/L (ref 3.5–5.3)
PROCALCITONIN SERPL-MCNC: 0.2 NG/ML
PROT SERPL-MCNC: 5.6 G/DL (ref 6.4–8.4)
RBC # BLD AUTO: 3.84 MILLION/UL (ref 3.81–5.12)
SODIUM SERPL-SCNC: 139 MMOL/L (ref 135–147)
TSH SERPL DL<=0.05 MIU/L-ACNC: 3.25 UIU/ML (ref 0.45–4.5)
WBC # BLD AUTO: 4.38 THOUSAND/UL (ref 4.31–10.16)
WBC SPEC QL GRAM STN: ABNORMAL

## 2023-08-28 PROCEDURE — 83735 ASSAY OF MAGNESIUM: CPT | Performed by: INTERNAL MEDICINE

## 2023-08-28 PROCEDURE — 99232 SBSQ HOSP IP/OBS MODERATE 35: CPT

## 2023-08-28 PROCEDURE — 84145 PROCALCITONIN (PCT): CPT | Performed by: INTERNAL MEDICINE

## 2023-08-28 PROCEDURE — 84100 ASSAY OF PHOSPHORUS: CPT | Performed by: INTERNAL MEDICINE

## 2023-08-28 PROCEDURE — 85025 COMPLETE CBC W/AUTO DIFF WBC: CPT | Performed by: INTERNAL MEDICINE

## 2023-08-28 PROCEDURE — 80053 COMPREHEN METABOLIC PANEL: CPT | Performed by: INTERNAL MEDICINE

## 2023-08-28 PROCEDURE — 82948 REAGENT STRIP/BLOOD GLUCOSE: CPT

## 2023-08-28 PROCEDURE — 84443 ASSAY THYROID STIM HORMONE: CPT | Performed by: INTERNAL MEDICINE

## 2023-08-28 PROCEDURE — 83605 ASSAY OF LACTIC ACID: CPT | Performed by: INTERNAL MEDICINE

## 2023-08-28 PROCEDURE — 83036 HEMOGLOBIN GLYCOSYLATED A1C: CPT | Performed by: INTERNAL MEDICINE

## 2023-08-28 RX ORDER — SACCHAROMYCES BOULARDII 250 MG
250 CAPSULE ORAL 2 TIMES DAILY
Status: DISCONTINUED | OUTPATIENT
Start: 2023-08-28 | End: 2023-09-06 | Stop reason: HOSPADM

## 2023-08-28 RX ORDER — DICYCLOMINE HYDROCHLORIDE 10 MG/1
10 CAPSULE ORAL 4 TIMES DAILY PRN
Status: DISCONTINUED | OUTPATIENT
Start: 2023-08-28 | End: 2023-09-06

## 2023-08-28 RX ORDER — POTASSIUM CHLORIDE 20 MEQ/1
40 TABLET, EXTENDED RELEASE ORAL ONCE
Status: COMPLETED | OUTPATIENT
Start: 2023-08-28 | End: 2023-08-28

## 2023-08-28 RX ORDER — VANCOMYCIN HYDROCHLORIDE 125 MG/1
125 CAPSULE ORAL EVERY 6 HOURS SCHEDULED
Status: DISCONTINUED | OUTPATIENT
Start: 2023-08-28 | End: 2023-08-31

## 2023-08-28 RX ORDER — DIPHENOXYLATE HYDROCHLORIDE AND ATROPINE SULFATE 2.5; .025 MG/1; MG/1
1 TABLET ORAL ONCE
Status: COMPLETED | OUTPATIENT
Start: 2023-08-28 | End: 2023-08-28

## 2023-08-28 RX ADMIN — VANCOMYCIN HYDROCHLORIDE 125 MG: 125 CAPSULE ORAL at 12:30

## 2023-08-28 RX ADMIN — HEPARIN SODIUM 5000 UNITS: 5000 INJECTION INTRAVENOUS; SUBCUTANEOUS at 21:12

## 2023-08-28 RX ADMIN — PIPERACILLIN AND TAZOBACTAM 3.38 G: 3; .375 INJECTION, POWDER, FOR SOLUTION INTRAVENOUS at 05:30

## 2023-08-28 RX ADMIN — TRIMETHOBENZAMIDE HYDROCHLORIDE 200 MG: 100 INJECTION INTRAMUSCULAR at 15:50

## 2023-08-28 RX ADMIN — PIPERACILLIN AND TAZOBACTAM 3.38 G: 3; .375 INJECTION, POWDER, FOR SOLUTION INTRAVENOUS at 23:36

## 2023-08-28 RX ADMIN — Medication 250 MG: at 17:10

## 2023-08-28 RX ADMIN — HEPARIN SODIUM 5000 UNITS: 5000 INJECTION INTRAVENOUS; SUBCUTANEOUS at 14:23

## 2023-08-28 RX ADMIN — DICYCLOMINE HYDROCHLORIDE 10 MG: 10 CAPSULE ORAL at 23:54

## 2023-08-28 RX ADMIN — LEVOTHYROXINE SODIUM 88 MCG: 88 TABLET ORAL at 05:30

## 2023-08-28 RX ADMIN — POTASSIUM CHLORIDE 40 MEQ: 1500 TABLET, EXTENDED RELEASE ORAL at 09:26

## 2023-08-28 RX ADMIN — DIPHENOXYLATE HYDROCHLORIDE AND ATROPINE SULFATE 1 TABLET: 2.5; .025 TABLET ORAL at 16:17

## 2023-08-28 RX ADMIN — PIPERACILLIN AND TAZOBACTAM 3.38 G: 3; .375 INJECTION, POWDER, FOR SOLUTION INTRAVENOUS at 11:57

## 2023-08-28 RX ADMIN — PIPERACILLIN AND TAZOBACTAM 3.38 G: 3; .375 INJECTION, POWDER, FOR SOLUTION INTRAVENOUS at 17:10

## 2023-08-28 RX ADMIN — PRAVASTATIN SODIUM 80 MG: 40 TABLET ORAL at 16:17

## 2023-08-28 RX ADMIN — VANCOMYCIN HYDROCHLORIDE 125 MG: 125 CAPSULE ORAL at 23:36

## 2023-08-28 RX ADMIN — ALPRAZOLAM 0.25 MG: 0.25 TABLET ORAL at 21:12

## 2023-08-28 RX ADMIN — VANCOMYCIN HYDROCHLORIDE 125 MG: 125 CAPSULE ORAL at 17:09

## 2023-08-28 RX ADMIN — ASPIRIN 81 MG: 81 TABLET, COATED ORAL at 09:26

## 2023-08-28 RX ADMIN — HEPARIN SODIUM 5000 UNITS: 5000 INJECTION INTRAVENOUS; SUBCUTANEOUS at 05:30

## 2023-08-28 RX ADMIN — Medication 250 MG: at 11:57

## 2023-08-28 RX ADMIN — DEXTROSE, SODIUM CHLORIDE, AND POTASSIUM CHLORIDE 75 ML/HR: 5; .9; .15 INJECTION INTRAVENOUS at 20:50

## 2023-08-28 NOTE — PLAN OF CARE
Problem: PAIN - ADULT  Goal: Verbalizes/displays adequate comfort level or baseline comfort level  Description: Interventions:  - Encourage patient to monitor pain and request assistance  - Assess pain using appropriate pain scale  - Administer analgesics based on type and severity of pain and evaluate response  - Implement non-pharmacological measures as appropriate and evaluate response  - Consider cultural and social influences on pain and pain management  - Notify physician/advanced practitioner if interventions unsuccessful or patient reports new pain  Outcome: Progressing     Problem: INFECTION - ADULT  Goal: Absence or prevention of progression during hospitalization  Description: INTERVENTIONS:  - Assess and monitor for signs and symptoms of infection  - Monitor lab/diagnostic results  - Monitor all insertion sites, i.e. indwelling lines, tubes, and drains  - [place] appropriate cooling/warming therapies per order  - Administer medications as ordered  - Instruct and encourage patient and family to use good hand hygiene technique  - Identify and instruct in appropriate isolation precautions for identified infection/condition  Outcome: Progressing  Goal: Absence of fever/infection during neutropenic period  Description: INTERVENTIONS:  - Monitor WBC    Outcome: Progressing     Problem: SAFETY ADULT  Goal: Patient will remain free of falls  Description: INTERVENTIONS:  - Educate patient/family on patient safety including physical limitations  - Instruct patient to call for assistance with activity   - Consult OT/PT to assist with strengthening/mobility   - Keep Call bell within reach  - Keep bed low and locked with side rails adjusted as appropriate  - Keep care items and personal belongings within reach  - Initiate and maintain comfort rounds  - Make Fall Risk Sign visible to staff  - Offer Toileting every 2 Hours, in advance of need  - Initiate/Maintain bed/chair alarm  - Obtain necessary fall risk management equipment:   - Apply yellow socks and bracelet for high fall risk patients  - Consider moving patient to room near nurses station  Outcome: Progressing  Goal: Maintain or return to baseline ADL function  Description: INTERVENTIONS:  -  Assess patient's ability to carry out ADLs; assess patient's baseline for ADL function and identify physical deficits which impact ability to perform ADLs (bathing, care of mouth/teeth, toileting, grooming, dressing, etc.)  - Assess/evaluate cause of self-care deficits   - Assess range of motion  - Assess patient's mobility; develop plan if impaired  - Assess patient's need for assistive devices and provide as appropriate  - Encourage maximum independence but intervene and supervise when necessary  - Involve family in performance of ADLs  - Assess for home care needs following discharge   - Consider OT consult to assist with ADL evaluation and planning for discharge  - Provide patient education as appropriate  Outcome: Progressing  Goal: Maintains/Returns to pre admission functional level  Description: INTERVENTIONS:  - Perform BMAT or MOVE assessment daily.   - Set and communicate daily mobility goal to care team and patient/family/caregiver. - Collaborate with rehabilitation services on mobility goals if consulted  - Perform Range of Motion 3  times a day. - Reposition patient every 2  hours.   - Dangle patient 3 times a day  - Stand patient 3  times a day  - Ambulate patient 3  times a day  - Out of bed to chair 3  times a day   - Out of bed for meals 3  times a day  - Out of bed for toileting  - Record patient progress and toleration of activity level   Outcome: Progressing     Problem: DISCHARGE PLANNING  Goal: Discharge to home or other facility with appropriate resources  Description: INTERVENTIONS:  - Identify barriers to discharge w/patient and caregiver  - Arrange for needed discharge resources and transportation as appropriate  - Identify discharge learning needs (meds, wound care, etc.)  - Arrange for interpretive services to assist at discharge as needed  - Refer to Case Management Department for coordinating discharge planning if the patient needs post-hospital services based on physician/advanced practitioner order or complex needs related to functional status, cognitive ability, or social support system  Outcome: Progressing     Problem: Knowledge Deficit  Goal: Patient/family/caregiver demonstrates understanding of disease process, treatment plan, medications, and discharge instructions  Description: Complete learning assessment and assess knowledge base.   Interventions:  - Provide teaching at level of understanding  - Provide teaching via preferred learning methods  Outcome: Progressing     Problem: GASTROINTESTINAL - ADULT  Goal: Minimal or absence of nausea and/or vomiting  Description: INTERVENTIONS:  - Administer IV fluids if ordered to ensure adequate hydration  - Maintain NPO status until nausea and vomiting are resolved  - Nasogastric tube if ordered  - Administer ordered antiemetic medications as needed  - Provide nonpharmacologic comfort measures as appropriate  - Advance diet as tolerated, if ordered  - Consider nutrition services referral to assist patient with adequate nutrition and appropriate food choices  Outcome: Progressing  Goal: Maintains or returns to baseline bowel function  Description: INTERVENTIONS:  - Assess bowel function  - Encourage oral fluids to ensure adequate hydration  - Administer IV fluids if ordered to ensure adequate hydration  - Administer ordered medications as needed  - Encourage mobilization and activity  - Consider nutritional services referral to assist patient with adequate nutrition and appropriate food choices  Outcome: Progressing

## 2023-08-28 NOTE — ASSESSMENT & PLAN NOTE
· Check stool cultures including a Clostridium difficile culture, a Rotavirus stool antigen test, a stool culture for enteric bacterial pathogens, and stool for fecal leukocytes. · Stool has tested positive for C. Diff, start PO vanco.   · May be cause of diarrhea and abdominal discomfort rather than sigmoid diverticulitis. · GI input appreciated.

## 2023-08-28 NOTE — ASSESSMENT & PLAN NOTE
· Utilize D5 NSS IV fluids with 20 mEq KCl at 75 ml/hr  · Follow the potassium level and magnesium level    Results from last 7 days   Lab Units 08/28/23  0529 08/27/23  1509 08/26/23  0556   SODIUM mmol/L 139 139 137   POTASSIUM mmol/L 3.4* 3.9 3.1*   CHLORIDE mmol/L 107 104 101   CO2 mmol/L 27 25 26   BUN mg/dL 7 10 9   CREATININE mg/dL 0.67 0.81 0.87   CALCIUM mg/dL 8.5 8.8 8.8

## 2023-08-28 NOTE — ASSESSMENT & PLAN NOTE
Lab Results   Component Value Date    HGBA1C 6.0 (H) 07/26/2023       (P) 101.25     · Diet-controlled  · Developed hypoglycemia on 08/27/2023  · Hypoglycemia protocol  · ISS with blood glucose monitoring  · Hypoglycemia has since resolved 08/28/2023 - now on D5NS

## 2023-08-28 NOTE — ASSESSMENT & PLAN NOTE
· Presented to the emergency room with complaints of abdominal pain  · Associated with fever, nausea, and non-bloody loose stools  · Patient was admitted from 8/13-8/16 for jejunal diverticulitis. Was being treated with PO Augmentin as outpatient  · CT shows-1. Interval increased diffuse sigmoid wall thickening with adjacent inflammatory change, most concerning for acute diverticulitis. No fluid collections. 2. Improving inflamed jejunal diverticulum.   · Received ceftriaxone and IV Flagyl in the ER - with complaints of nausea with flagyl switch to IV Zosyn  · Initially kept NPO  · Advance to a clear liquid diet on 08/27/2023  · IV fluids to be continued  · Pain medication PRN  · May require a General Surgery evaluation during this hospitalization  · The patient has refused surgery for the recurrent diverticulitis in the past.  · Please refer to [facility] for diarrhea   · GI consult appreciated

## 2023-08-28 NOTE — PROGRESS NOTES
[address]  Progress Note  Name: Bryce Howell  MRN: [id number]  Unit/Bed#: 900-04 I Date of Admission: 8/25/2023   Date of Service: 8/28/2023 I Hospital Day: 2    Assessment/Plan   * Sigmoid diverticulitis  Assessment & Plan  · Presented to the emergency room with complaints of abdominal pain  · Associated with fever, nausea, and non-bloody loose stools  · Patient was admitted from 8/13-8/16 for jejunal diverticulitis. Was being treated with PO Augmentin as outpatient  · CT shows-1. Interval increased diffuse sigmoid wall thickening with adjacent inflammatory change, most concerning for acute diverticulitis. No fluid collections. 2. Improving inflamed jejunal diverticulum. · Received ceftriaxone and IV Flagyl in the ER - with complaints of nausea with flagyl switch to IV Zosyn  · Initially kept NPO  · Advance to a clear liquid diet on 08/27/2023  · IV fluids to be continued  · Pain medication PRN  · May require a General Surgery evaluation during this hospitalization  · The patient has refused surgery for the recurrent diverticulitis in the past.  · Please refer to [facility] for diarrhea   · GI consult appreciated    Diarrhea  Assessment & Plan  · Check stool cultures including a Clostridium difficile culture, a Rotavirus stool antigen test, a stool culture for enteric bacterial pathogens, and stool for fecal leukocytes. · Stool has tested positive for C. Diff, start PO vanco.   · May be cause of diarrhea and abdominal discomfort rather than sigmoid diverticulitis. · GI input appreciated.        Hypokalemia  Assessment & Plan  · Utilize D5 NSS IV fluids with 20 mEq KCl at 75 ml/hr  · Follow the potassium level and magnesium level    Results from last 7 days   Lab Units 08/28/23  0529 08/27/23  1509 08/26/23  0556   SODIUM mmol/L 139 139 137   POTASSIUM mmol/L 3.4* 3.9 3.1*   CHLORIDE mmol/L 107 104 101   CO2 mmol/L 27 25 26   BUN mg/dL 7 10 9   CREATININE mg/dL 0.67 0.81 0.87   CALCIUM mg/dL 8.5 8.8 8.8         Other hyperlipidemia  Assessment & Plan  · Continue on simvastatin 40 mg p.o. at bedtime  · Will give formulary alternative pravastatin 80 mg p.o. at bedtime  · PCP follow-up post discharge     Adrenal adenoma, left  Assessment & Plan  · Outpatient work-up and surveillance imaging with PCP    Hypomagnesemia  Assessment & Plan  · Follow the magnesium level    Results from last 7 days   Lab Units 08/28/23  0529 08/27/23  1509 08/26/23  0556   MAGNESIUM mg/dL 2.0 1.7* 1.8*         Hypothyroidism  Assessment & Plan  · Check a TSH level, TSH 3.247 (08/28/2023)  · Continue levothyroxine    Type 2 diabetes mellitus with hypoglycemia without coma, without long-term current use of insulin (HCC)  Assessment & Plan  Lab Results   Component Value Date    HGBA1C 6.0 (H) 07/26/2023       (P) 101.25     · Diet-controlled  · Developed hypoglycemia on 08/27/2023  · Hypoglycemia protocol  · ISS with blood glucose monitoring  · Hypoglycemia has since resolved 08/28/2023 - now on D5NS             VTE Pharmacologic Prophylaxis: VTE Score: 5 High Risk (Score >/= 5) - Pharmacological DVT Prophylaxis Ordered: heparin. Sequential Compression Devices Ordered. Patient Centered Rounds: I performed bedside rounds with nursing staff today. Discussions with Specialists or Other Care Team Provider:     Education and Discussions with Family / Patient: Patient declined call to . Total Time Spent on Date of Encounter in care of patient: 45 minutes This time was spent on one or more of the following: performing physical exam; counseling and coordination of care; obtaining or reviewing history; documenting in the medical record; reviewing/ordering tests, medications or procedures; communicating with other healthcare professionals and discussing with patient's family/caregivers.     Current Length of Stay: 2 day(s)  Current Patient Status: Inpatient   Certification Statement: The patient will continue to require additional inpatient hospital stay due to treatment of C. diff, GI consult needed, ongoing treatment of diarrhea  Discharge Plan: Anticipate discharge in 48-72 hrs to home with home services. Code Status: Level 1 - Full Code    Subjective: The patient was seen and examined. Still has complaints of diarrhea and says she has to "run to bathroom every 20 minutes." The diarrhea is noted by patient to be light brown and minimal in amount. Patient notes that her abdomen is tender. Still with nausea, tolerating diet wo vomiting. Objective:     Vitals:   Temp (24hrs), Av.3 °F (36.8 °C), Min:97.9 °F (36.6 °C), Max:98.5 °F (36.9 °C)    Temp:  [97.9 °F (36.6 °C)-98.5 °F (36.9 °C)] 98.5 °F (36.9 °C)  HR:  [62-81] 64  Resp:  [14-18] 18  BP: (125-133)/(64-69) 125/64  SpO2:  [94 %-95 %] 95 %  Body mass index is 28.86 kg/m². Input and Output Summary (last 24 hours): Intake/Output Summary (Last 24 hours) at 2023 1011  Last data filed at 2023 0900  Gross per 24 hour   Intake 1920 ml   Output --   Net 1920 ml       Physical Exam:   Physical Exam  Constitutional:       Appearance: She is ill-appearing. HENT:      Head: Normocephalic and atraumatic. Cardiovascular:      Rate and Rhythm: Normal rate and regular rhythm. Pulses: Normal pulses. Heart sounds: Normal heart sounds. Pulmonary:      Effort: Pulmonary effort is normal.      Breath sounds: Normal breath sounds. Abdominal:      General: Bowel sounds are normal.      Tenderness: There is abdominal tenderness. Comments: Tenderness noted in LLQ, in suprapubic and periumbilical regions   Musculoskeletal:      Right lower leg: No edema. Left lower leg: No edema. Skin:     General: Skin is warm. Neurological:      Mental Status: She is alert and oriented to person, place, and time.           Additional Data:     Labs:  Results from last 7 days   Lab Units 23  0529   WBC Thousand/uL 4.38   HEMOGLOBIN g/dL 11.1* HEMATOCRIT % 34.0*   PLATELETS Thousands/uL 245   NEUTROS PCT % 67   LYMPHS PCT % 18   MONOS PCT % 11   EOS PCT % 3     Results from last 7 days   Lab Units 08/28/23  0529   SODIUM mmol/L 139   POTASSIUM mmol/L 3.4*   CHLORIDE mmol/L 107   CO2 mmol/L 27   BUN mg/dL 7   CREATININE mg/dL 0.67   ANION GAP mmol/L 5   CALCIUM mg/dL 8.5   ALBUMIN g/dL 3.0*   TOTAL BILIRUBIN mg/dL 0.48   ALK PHOS U/L 53   ALT U/L 7   AST U/L 10*   GLUCOSE RANDOM mg/dL 99         Results from last 7 days   Lab Units 08/28/23  0721 08/27/23  2116 08/27/23  1649 08/27/23  1405 08/27/23  0021 08/26/23  1907 08/26/23  1102 08/26/23  0700   POC GLUCOSE mg/dl 102 81 142* 64* 100 89 114 118         Results from last 7 days   Lab Units 08/28/23  0529 08/25/23  1814   LACTIC ACID mmol/L 0.8 0.8   PROCALCITONIN ng/ml 0.20  --        Lines/Drains:  Invasive Devices     Peripheral Intravenous Line  Duration           Peripheral IV 08/26/23 Dorsal (posterior); Right Forearm 1 day    Peripheral IV 08/27/23 Ventral (anterior); Right Forearm <1 day                      Imaging: Reviewed radiology reports from this admission including: abdominal/pelvic CT    Recent Cultures (last 7 days):   Results from last 7 days   Lab Units 08/25/23  2357   C DIFF TOXIN B BY PCR  Positive*       Last 24 Hours Medication List:   Current Facility-Administered Medications   Medication Dose Route Frequency Provider Last Rate   • acetaminophen  650 mg Oral Q6H PRN Shaun Calvillo PA-C     • ALPRAZolam  0.25 mg Oral BID PRN Violeta Flor MD     • aspirin  81 mg Oral Daily Shaun Calvillo PA-C     • dextrose 5 % and sodium chloride 0.9 % with KCl 20 mEq/L  75 mL/hr Intravenous Continuous Mhor Milder Katya, DO 75 mL/hr (08/27/23 1448)   • dextrose  25 mL Intravenous Daily PRN Mohr Milder Joseph, DO     • heparin (porcine)  5,000 Units Subcutaneous Q8H [facility] Shaun Calvillo PA-C     • levothyroxine  88 mcg Oral Early Morning Shaun Calvillo PA-C     • nicotine  1 patch Transdermal Daily Shaun Calvillo PA-C     • piperacillin-tazobactam  3.375 g Intravenous Q6H Adiel Cruz PA-C 3.375 g (08/28/23 0530)   • pravastatin  80 mg Oral Daily With Dinner Shaun Calvillo PA-C     • saccharomyces boulardii  250 mg Oral BID Yoselin Burk PA-C     • trimethobenzamide  200 mg Intramuscular Q6H PRN Adiel Cruz PA-C     • vancomycin oral (capsules or solution)  125 mg Oral Q6H Milo Byrnes PA-C          Today, Patient Was Seen By: James Cassidy PA-C    **Please Note: This note may have been constructed using a voice recognition system. **

## 2023-08-28 NOTE — ASSESSMENT & PLAN NOTE
· Follow the magnesium level    Results from last 7 days   Lab Units 08/28/23  0529 08/27/23  1509 08/26/23  0556   MAGNESIUM mg/dL 2.0 1.7* 1.8*

## 2023-08-29 PROBLEM — E87.6 HYPOKALEMIA: Status: RESOLVED | Noted: 2023-08-27 | Resolved: 2023-08-29

## 2023-08-29 PROBLEM — E83.42 HYPOMAGNESEMIA: Status: RESOLVED | Noted: 2023-08-13 | Resolved: 2023-08-29

## 2023-08-29 PROBLEM — A04.72 C. DIFFICILE COLITIS: Status: ACTIVE | Noted: 2023-08-27

## 2023-08-29 LAB
ALBUMIN SERPL BCP-MCNC: 3.3 G/DL (ref 3.5–5)
ALP SERPL-CCNC: 81 U/L (ref 34–104)
ALT SERPL W P-5'-P-CCNC: 8 U/L (ref 7–52)
ANION GAP SERPL CALCULATED.3IONS-SCNC: 7 MMOL/L
AST SERPL W P-5'-P-CCNC: 12 U/L (ref 13–39)
BASOPHILS # BLD AUTO: 0.02 THOUSANDS/ÂΜL (ref 0–0.1)
BASOPHILS NFR BLD AUTO: 0 % (ref 0–1)
BILIRUB SERPL-MCNC: 0.57 MG/DL (ref 0.2–1)
BUN SERPL-MCNC: 4 MG/DL (ref 5–25)
CALCIUM ALBUM COR SERPL-MCNC: 9.2 MG/DL (ref 8.3–10.1)
CALCIUM SERPL-MCNC: 8.6 MG/DL (ref 8.4–10.2)
CHLORIDE SERPL-SCNC: 108 MMOL/L (ref 96–108)
CO2 SERPL-SCNC: 26 MMOL/L (ref 21–32)
CREAT SERPL-MCNC: 0.76 MG/DL (ref 0.6–1.3)
EOSINOPHIL # BLD AUTO: 0.16 THOUSAND/ÂΜL (ref 0–0.61)
EOSINOPHIL NFR BLD AUTO: 4 % (ref 0–6)
ERYTHROCYTE [DISTWIDTH] IN BLOOD BY AUTOMATED COUNT: 14.4 % (ref 11.6–15.1)
GFR SERPL CREATININE-BSD FRML MDRD: 72 ML/MIN/1.73SQ M
GLUCOSE SERPL-MCNC: 100 MG/DL (ref 65–140)
GLUCOSE SERPL-MCNC: 103 MG/DL (ref 65–140)
GLUCOSE SERPL-MCNC: 116 MG/DL (ref 65–140)
GLUCOSE SERPL-MCNC: 119 MG/DL (ref 65–140)
GLUCOSE SERPL-MCNC: 184 MG/DL (ref 65–140)
HCT VFR BLD AUTO: 40.4 % (ref 34.8–46.1)
HGB BLD-MCNC: 13 G/DL (ref 11.5–15.4)
IMM GRANULOCYTES # BLD AUTO: 0.03 THOUSAND/UL (ref 0–0.2)
IMM GRANULOCYTES NFR BLD AUTO: 1 % (ref 0–2)
LYMPHOCYTES # BLD AUTO: 0.91 THOUSANDS/ÂΜL (ref 0.6–4.47)
LYMPHOCYTES NFR BLD AUTO: 20 % (ref 14–44)
MCH RBC QN AUTO: 29 PG (ref 26.8–34.3)
MCHC RBC AUTO-ENTMCNC: 32.2 G/DL (ref 31.4–37.4)
MCV RBC AUTO: 90 FL (ref 82–98)
MONOCYTES # BLD AUTO: 0.67 THOUSAND/ÂΜL (ref 0.17–1.22)
MONOCYTES NFR BLD AUTO: 15 % (ref 4–12)
NEUTROPHILS # BLD AUTO: 2.7 THOUSANDS/ÂΜL (ref 1.85–7.62)
NEUTS SEG NFR BLD AUTO: 60 % (ref 43–75)
NRBC BLD AUTO-RTO: 0 /100 WBCS
PLATELET # BLD AUTO: 309 THOUSANDS/UL (ref 149–390)
PMV BLD AUTO: 10.4 FL (ref 8.9–12.7)
POTASSIUM SERPL-SCNC: 3.6 MMOL/L (ref 3.5–5.3)
PROT SERPL-MCNC: 6 G/DL (ref 6.4–8.4)
RBC # BLD AUTO: 4.48 MILLION/UL (ref 3.81–5.12)
SODIUM SERPL-SCNC: 141 MMOL/L (ref 135–147)
WBC # BLD AUTO: 4.49 THOUSAND/UL (ref 4.31–10.16)

## 2023-08-29 PROCEDURE — 99232 SBSQ HOSP IP/OBS MODERATE 35: CPT

## 2023-08-29 PROCEDURE — 80053 COMPREHEN METABOLIC PANEL: CPT

## 2023-08-29 PROCEDURE — 82948 REAGENT STRIP/BLOOD GLUCOSE: CPT

## 2023-08-29 PROCEDURE — 99223 1ST HOSP IP/OBS HIGH 75: CPT | Performed by: STUDENT IN AN ORGANIZED HEALTH CARE EDUCATION/TRAINING PROGRAM

## 2023-08-29 PROCEDURE — 85025 COMPLETE CBC W/AUTO DIFF WBC: CPT

## 2023-08-29 RX ADMIN — Medication 250 MG: at 09:27

## 2023-08-29 RX ADMIN — HEPARIN SODIUM 5000 UNITS: 5000 INJECTION INTRAVENOUS; SUBCUTANEOUS at 21:10

## 2023-08-29 RX ADMIN — VANCOMYCIN HYDROCHLORIDE 125 MG: 125 CAPSULE ORAL at 17:11

## 2023-08-29 RX ADMIN — LEVOTHYROXINE SODIUM 88 MCG: 88 TABLET ORAL at 05:01

## 2023-08-29 RX ADMIN — HEPARIN SODIUM 5000 UNITS: 5000 INJECTION INTRAVENOUS; SUBCUTANEOUS at 05:00

## 2023-08-29 RX ADMIN — PRAVASTATIN SODIUM 80 MG: 40 TABLET ORAL at 17:11

## 2023-08-29 RX ADMIN — VANCOMYCIN HYDROCHLORIDE 125 MG: 125 CAPSULE ORAL at 05:00

## 2023-08-29 RX ADMIN — PIPERACILLIN AND TAZOBACTAM 3.38 G: 3; .375 INJECTION, POWDER, FOR SOLUTION INTRAVENOUS at 11:52

## 2023-08-29 RX ADMIN — VANCOMYCIN HYDROCHLORIDE 125 MG: 125 CAPSULE ORAL at 11:52

## 2023-08-29 RX ADMIN — HEPARIN SODIUM 5000 UNITS: 5000 INJECTION INTRAVENOUS; SUBCUTANEOUS at 14:42

## 2023-08-29 RX ADMIN — Medication 250 MG: at 17:11

## 2023-08-29 RX ADMIN — ASPIRIN 81 MG: 81 TABLET, COATED ORAL at 09:27

## 2023-08-29 RX ADMIN — TRIMETHOBENZAMIDE HYDROCHLORIDE 200 MG: 100 INJECTION INTRAMUSCULAR at 04:52

## 2023-08-29 RX ADMIN — PIPERACILLIN AND TAZOBACTAM 3.38 G: 3; .375 INJECTION, POWDER, FOR SOLUTION INTRAVENOUS at 04:52

## 2023-08-29 RX ADMIN — ALPRAZOLAM 0.25 MG: 0.25 TABLET ORAL at 21:10

## 2023-08-29 NOTE — PLAN OF CARE
Problem: PAIN - ADULT  Goal: Verbalizes/displays adequate comfort level or baseline comfort level  Description: Interventions:  - Encourage patient to monitor pain and request assistance  - Assess pain using appropriate pain scale  - Administer analgesics based on type and severity of pain and evaluate response  - Implement non-pharmacological measures as appropriate and evaluate response  - Consider cultural and social influences on pain and pain management  - Notify physician/advanced practitioner if interventions unsuccessful or patient reports new pain  Outcome: Progressing     Problem: INFECTION - ADULT  Goal: Absence or prevention of progression during hospitalization  Description: INTERVENTIONS:  - Assess and monitor for signs and symptoms of infection  - Monitor lab/diagnostic results  - Monitor all insertion sites, i.e. indwelling lines, tubes, and drains  - [place] appropriate cooling/warming therapies per order  - Administer medications as ordered  - Instruct and encourage patient and family to use good hand hygiene technique  - Identify and instruct in appropriate isolation precautions for identified infection/condition  Outcome: Progressing  Goal: Absence of fever/infection during neutropenic period  Description: INTERVENTIONS:  - Monitor WBC    Outcome: Progressing     Problem: SAFETY ADULT  Goal: Patient will remain free of falls  Description: INTERVENTIONS:  - Educate patient/family on patient safety including physical limitations  - Instruct patient to call for assistance with activity   - Consult OT/PT to assist with strengthening/mobility   - Keep Call bell within reach  - Keep bed low and locked with side rails adjusted as appropriate  - Keep care items and personal belongings within reach  - Initiate and maintain comfort rounds  - Make Fall Risk Sign visible to staff  - Offer Toileting every 2 Hours, in advance of need  - Initiate/Maintain bed/chair alarm  - Obtain necessary fall risk management equipment:   - Apply yellow socks and bracelet for high fall risk patients  - Consider moving patient to room near nurses station  Outcome: Progressing  Goal: Maintain or return to baseline ADL function  Description: INTERVENTIONS:  -  Assess patient's ability to carry out ADLs; assess patient's baseline for ADL function and identify physical deficits which impact ability to perform ADLs (bathing, care of mouth/teeth, toileting, grooming, dressing, etc.)  - Assess/evaluate cause of self-care deficits   - Assess range of motion  - Assess patient's mobility; develop plan if impaired  - Assess patient's need for assistive devices and provide as appropriate  - Encourage maximum independence but intervene and supervise when necessary  - Involve family in performance of ADLs  - Assess for home care needs following discharge   - Consider OT consult to assist with ADL evaluation and planning for discharge  - Provide patient education as appropriate  Outcome: Progressing  Goal: Maintains/Returns to pre admission functional level  Description: INTERVENTIONS:  - Perform BMAT or MOVE assessment daily.   - Set and communicate daily mobility goal to care team and patient/family/caregiver. - Collaborate with rehabilitation services on mobility goals if consulted  - Perform Range of Motion 3 times a day. - Reposition patient every 2 hours.   - Dangle patient 3  times a day  - Stand patient 3  times a day  - Ambulate patient 3  times a day  - Out of bed to chair 3 times a day   - Out of bed for meals 3  times a day  - Out of bed for toileting  - Record patient progress and toleration of activity level   Outcome: Progressing     Problem: DISCHARGE PLANNING  Goal: Discharge to home or other facility with appropriate resources  Description: INTERVENTIONS:  - Identify barriers to discharge w/patient and caregiver  - Arrange for needed discharge resources and transportation as appropriate  - Identify discharge learning needs (meds, wound care, etc.)  - Arrange for interpretive services to assist at discharge as needed  - Refer to Case Management Department for coordinating discharge planning if the patient needs post-hospital services based on physician/advanced practitioner order or complex needs related to functional status, cognitive ability, or social support system  Outcome: Progressing     Problem: Knowledge Deficit  Goal: Patient/family/caregiver demonstrates understanding of disease process, treatment plan, medications, and discharge instructions  Description: Complete learning assessment and assess knowledge base.   Interventions:  - Provide teaching at level of understanding  - Provide teaching via preferred learning methods  Outcome: Progressing     Problem: GASTROINTESTINAL - ADULT  Goal: Minimal or absence of nausea and/or vomiting  Description: INTERVENTIONS:  - Administer IV fluids if ordered to ensure adequate hydration  - Maintain NPO status until nausea and vomiting are resolved  - Nasogastric tube if ordered  - Administer ordered antiemetic medications as needed  - Provide nonpharmacologic comfort measures as appropriate  - Advance diet as tolerated, if ordered  - Consider nutrition services referral to assist patient with adequate nutrition and appropriate food choices  Outcome: Progressing  Goal: Maintains or returns to baseline bowel function  Description: INTERVENTIONS:  - Assess bowel function  - Encourage oral fluids to ensure adequate hydration  - Administer IV fluids if ordered to ensure adequate hydration  - Administer ordered medications as needed  - Encourage mobilization and activity  - Consider nutritional services referral to assist patient with adequate nutrition and appropriate food choices  Outcome: Progressing

## 2023-08-29 NOTE — PLAN OF CARE
Problem: PAIN - ADULT  Goal: Verbalizes/displays adequate comfort level or baseline comfort level  Description: Interventions:  - Encourage patient to monitor pain and request assistance  - Assess pain using appropriate pain scale  - Administer analgesics based on type and severity of pain and evaluate response  - Implement non-pharmacological measures as appropriate and evaluate response  - Consider cultural and social influences on pain and pain management  - Notify physician/advanced practitioner if interventions unsuccessful or patient reports new pain  Outcome: Progressing     Problem: INFECTION - ADULT  Goal: Absence or prevention of progression during hospitalization  Description: INTERVENTIONS:  - Assess and monitor for signs and symptoms of infection  - Monitor lab/diagnostic results  - Monitor all insertion sites, i.e. indwelling lines, tubes, and drains  - Monitor endotracheal if appropriate and nasal secretions for changes in amount and color  - [place] appropriate cooling/warming therapies per order  - Administer medications as ordered  - Instruct and encourage patient and family to use good hand hygiene technique  - Identify and instruct in appropriate isolation precautions for identified infection/condition  Outcome: Progressing  Goal: Absence of fever/infection during neutropenic period  Description: INTERVENTIONS:  - Monitor WBC    Outcome: Progressing     Problem: SAFETY ADULT  Goal: Patient will remain free of falls  Description: INTERVENTIONS:  - Educate patient/family on patient safety including physical limitations  - Instruct patient to call for assistance with activity   - Consult OT/PT to assist with strengthening/mobility   - Keep Call bell within reach  - Keep bed low and locked with side rails adjusted as appropriate  - Keep care items and personal belongings within reach  - Initiate and maintain comfort rounds  - Make Fall Risk Sign visible to staff  - Offer Toileting every 2 Hours, in advance of need  Outcome: Progressing  Goal: Maintain or return to baseline ADL function  Description: INTERVENTIONS:  -  Assess patient's ability to carry out ADLs; assess patient's baseline for ADL function and identify physical deficits which impact ability to perform ADLs (bathing, care of mouth/teeth, toileting, grooming, dressing, etc.)  - Assess/evaluate cause of self-care deficits   - Assess range of motion  - Assess patient's mobility; develop plan if impaired  - Assess patient's need for assistive devices and provide as appropriate  - Encourage maximum independence but intervene and supervise when necessary  - Involve family in performance of ADLs  - Assess for home care needs following discharge   - Consider OT consult to assist with ADL evaluation and planning for discharge  - Provide patient education as appropriate  Outcome: Progressing  Goal: Maintains/Returns to pre admission functional level  Description: INTERVENTIONS:  - Perform BMAT or MOVE assessment daily.   - Set and communicate daily mobility goal to care team and patient/family/caregiver. - Collaborate with rehabilitation services on mobility goals if consulted  - Perform Range of Motion 3-4 times a day.   - Ambulate patient 3 times a day  - Out of bed to chair 3 times a day   - Out of bed for meals 3 times a day  - Out of bed for toileting  - Record patient progress and toleration of activity level   Outcome: Progressing     Problem: DISCHARGE PLANNING  Goal: Discharge to home or other facility with appropriate resources  Description: INTERVENTIONS:  - Identify barriers to discharge w/patient and caregiver  - Arrange for needed discharge resources and transportation as appropriate  - Identify discharge learning needs (meds, wound care, etc.)  - Arrange for interpretive services to assist at discharge as needed  - Refer to Case Management Department for coordinating discharge planning if the patient needs post-hospital services based on physician/advanced practitioner order or complex needs related to functional status, cognitive ability, or social support system  Outcome: Progressing     Problem: Knowledge Deficit  Goal: Patient/family/caregiver demonstrates understanding of disease process, treatment plan, medications, and discharge instructions  Description: Complete learning assessment and assess knowledge base.   Interventions:  - Provide teaching at level of understanding  - Provide teaching via preferred learning methods  Outcome: Progressing     Problem: GASTROINTESTINAL - ADULT  Goal: Minimal or absence of nausea and/or vomiting  Description: INTERVENTIONS:  - Administer IV fluids if ordered to ensure adequate hydration  - Maintain NPO status until nausea and vomiting are resolved  - Nasogastric tube if ordered  - Administer ordered antiemetic medications as needed  - Provide nonpharmacologic comfort measures as appropriate  - Advance diet as tolerated, if ordered  - Consider nutrition services referral to assist patient with adequate nutrition and appropriate food choices  Outcome: Progressing  Goal: Maintains or returns to baseline bowel function  Description: INTERVENTIONS:  - Assess bowel function  - Encourage oral fluids to ensure adequate hydration  - Administer IV fluids if ordered to ensure adequate hydration  - Administer ordered medications as needed  - Encourage mobilization and activity  - Consider nutritional services referral to assist patient with adequate nutrition and appropriate food choices  Outcome: Progressing

## 2023-08-29 NOTE — ASSESSMENT & PLAN NOTE
· Presented to the emergency room with complaints of abdominal pain. Associated with fever, nausea, and non-bloody, mucus loose stools  · Patient was admitted from 8/13-8/16 for jejunal diverticulitis. Was being treated with PO Augmentin as outpatient  · CT abd/pelvis: Interval increased diffuse sigmoid wall thickening with adjacent inflammatory change, most concerning for acute diverticulitis. No fluid collections. 2. Improving inflamed jejunal diverticulum.   · Remains afebrile, wo leukocytosis  · C/w CLD - still w/ nausea   · Continue Zosyn (D4) for now  · D/w GI pending continuation vs monitoring off systemic abx as concerns symptoms may be more related to C diff colitis  · Appreciate GI consult  · May require a General Surgery evaluation during this hospitalization  · The patient has refused surgery for the recurrent diverticulitis in the past.

## 2023-08-29 NOTE — ASSESSMENT & PLAN NOTE
· C diff testing positive  · Continue PO vanc (D2)  · Monitor stool output - still with very frequent BMs

## 2023-08-29 NOTE — ASSESSMENT & PLAN NOTE
Lab Results   Component Value Date    HGBA1C 6.0 (H) 08/28/2023       (P) [id number]     · Diet-controlled  · ISS with blood glucose monitoring  · C/w D5NS for now given poor oral intake

## 2023-08-29 NOTE — CONSULTS
Consultation - [address] [place] [address] [facility]  Joel Cuadra 79 y.o. female MRN: [id number]  Unit/Bed#: 410-01 Encounter: [id number]    Inpatient consult to gastroenterology  Consult performed by: Meliton Galvez PA-C  Consult ordered by: Alon Lebron PA-C        Reason for Consult / Principal Problem:     "diverticulitis, c diff colitis"    ASSESSMENT AND PLAN:      80 y/o F w/ pmhx sig for diverticulosis and history of diverticulitis, HTN, TIA, tobacco use disorder. Recently had OP eval on 08/05/23 with dx of diverticulitis, failed OP abx therapy with cipro/flagyl -> augmentin/flagyl, had GI intolerance to flagyl, though failed OP tx and was admitted on 08/13/23. CT A/P on 08/13/23 revealed jejunal diverticulitis. Was treated inpatient with rocephin/flagyl, d/c on Augmentin x 14 days. She unfortunately had worsening diarrhea and returned to ER on 08/25/23, with repeat non-con CT 08/25/23 Interval increased diffuse sigmoid wall thickening with adjacent inflammatory change, most concerning for acute diverticulitis, no fluid collections. Admission labs with leukocytosis with WBC 16.69, Hb 14.4, platelets 669, BUN 11, creatinine 0.81, AST 14, ALT 10, ALP 68, albumin 3.9, T. bili 0.89. Stool testing with C. difficile PCR and EIA positive, negative stool enteric panel, negative rotavirus. Patient was started on Zosyn and oral vancomycin. Pt shares last colonoscopy within past 2-3 years with [facility], shares no obvious lesions noted though was told severe diverticular disease. Diverticulosis w/ hx of recurrent diverticulitis   C. difficile infection    · Patient with recent hospitalization for sigmoid diverticulitis and jejunal diverticulitis, treated with IV Rocephin/Flagyl -> d/c on Augmentin to complete 14 day course. · Now with recurrence of diarrhea, CT reads sigmoid wall thickening and stool studies demonstrate active C. difficile infection.   Patient had leukocytosis on admission which has since resolved. Patient has been afebrile. · At this time, it is most suspicious that her current constellation of symptoms is most consistent with active C. difficile infection. She has completed an appropriate antibiotic course for diverticulitis. · Would continue on oral vancomycin at this time. 125 mg Q6 hrs x 10-14 days. · No strong indication to continue with systemic antibiotics from a GI standpoint. · Continue with monitoring WBC curve, serial abdominal examinations, and vital signs very closely. · Consideration should be given to a repeat colonoscopy in 6 to 8 weeks, though patient follows with [facility] and was told given the severity of diverticular disease to avoid any additional colonoscopies if possible. · Okay for advancement of diet as is tolerated, would recommend trial of low residue as she remains on clears at present. GI will continue to follow at this time. ______________________________________________________________________    HPI: Patient is a 79 y.o. female w/ pmhx sig for diverticulosis and history of diverticulitis, HTN, TIA, tobacco use disorder. Pt was recently evaluated in the ER in 08/05/23 for diverticulitis, treated with cipro/flagyl -> augmentin/flagyl, had GI intolerance to flagyl, though failed OP tx and was admitted on 08/13/23. CT A/P on 08/13/23 revealed jejunal diverticulitis. Was treated inpatient with rocephin/flagyl, d/c on Augmentin x 14 days. She unfortunately had worsening diarrhea and returned to ER on 08/25/23, with repeat non-con CT 08/25/23 Interval increased diffuse sigmoid wall thickening with adjacent inflammatory change, most concerning for acute diverticulitis, no fluid collections. Admission labs with leukocytosis with WBC 16.69, Hb 14.4, platelets 211, BUN 11, creatinine 0.81, AST 14, ALT 10, ALP 68, albumin 3.9, T. bili 0.89. Stool testing with C. difficile PCR and EIA positive, negative stool enteric panel, negative rotavirus.   Patient was started on Zosyn and oral vancomycin. At bedside evaluation on 2023, patient shares that she is continuing to have nocturnal BMs every 2 hours as well as  frequent urgent loose stools this morning. She notes some abdominal and rectal cramping prior to defecation which abates following defecation. She denies any BRBPR or melena. She is nauseous and has a poor appetite though denies emesis. She denies any dysphagia or odynophagia. She denies any unintentional weight loss over the past 6 months. Endoscopic history:   Colon: 2020: report unavailable     Review of Systems   Otherwise Per HPI    Historical Information   Past Medical History:   Diagnosis Date   • Diverticulitis    • Hypertension    • TIA (transient ischemic attack)      Past Surgical History:   Procedure Laterality Date   • COLECTOMY       Social History   Social History     Substance and Sexual Activity   Alcohol Use Never     Social History     Substance and Sexual Activity   Drug Use Never     Social History     Tobacco Use   Smoking Status Every Day   • Packs/day: 0.25   • Types: Cigarettes   Smokeless Tobacco Never     History reviewed. No pertinent family history.     Meds/Allergies     Medications Prior to Admission   Medication   • acetaminophen (TYLENOL) 500 mg tablet   • [] amoxicillin-clavulanate (AUGMENTIN) 875-125 mg per tablet   • aspirin (ECOTRIN LOW STRENGTH) 81 mg EC tablet   • hydrochlorothiazide (HYDRODIURIL) 25 mg tablet   • levothyroxine 88 mcg tablet   • ondansetron (ZOFRAN) 4 mg tablet   • simvastatin (ZOCOR) 40 mg tablet   • ALPRAZolam (XANAX) 0.25 mg tablet   • ergocalciferol (ERGOCALCIFEROL) 1.25 MG (50700 UT) capsule     Current Facility-Administered Medications   Medication Dose Route Frequency   • acetaminophen (TYLENOL) tablet 650 mg  650 mg Oral Q6H PRN   • ALPRAZolam (XANAX) tablet 0.25 mg  0.25 mg Oral BID PRN   • aspirin (ECOTRIN LOW STRENGTH) EC tablet 81 mg  81 mg Oral Daily   • dextrose 5 % and sodium chloride 0.9 % with KCl 20 mEq/L infusion (premix)  75 mL/hr Intravenous Continuous   • dextrose 50 % IV solution 25 mL  25 mL Intravenous Daily PRN   • dicyclomine (BENTYL) capsule 10 mg  10 mg Oral 4x Daily PRN   • heparin (porcine) subcutaneous injection 5,000 Units  5,000 Units Subcutaneous Q8H [facility]   • levothyroxine tablet 88 mcg  88 mcg Oral Early Morning   • nicotine (NICODERM CQ) 7 mg/24hr TD 24 hr patch 1 patch  1 patch Transdermal Daily   • piperacillin-tazobactam (ZOSYN) 3.375 g in sodium chloride 0.9 % 100 mL IVPB  3.375 g Intravenous Q6H   • pravastatin (PRAVACHOL) tablet 80 mg  80 mg Oral Daily With Dinner   • saccharomyces boulardii (FLORASTOR) capsule 250 mg  250 mg Oral BID   • trimethobenzamide (TIGAN) IM injection 200 mg  200 mg Intramuscular Q6H PRN   • vancomycin (VANCOCIN) capsule 125 mg  125 mg Oral Q6H [facility]     Allergies   Allergen Reactions   • Iodine - Food Allergy Hives     Objective     Blood pressure 132/67, pulse 68, temperature 98.1 °F (36.7 °C), temperature source Oral, resp. rate 18, height 5' 3" (1.6 m), weight 74.1 kg (163 lb 6.1 oz), SpO2 94 %. Body mass index is 28.94 kg/m². Intake/Output Summary (Last 24 hours) at 8/29/2023 0823  Last data filed at 8/29/2023 0601  Gross per 24 hour   Intake 2900 ml   Output --   Net 2900 ml     Physical Exam  Vitals and nursing note reviewed. Constitutional:       Appearance: Normal appearance. HENT:      Head: Normocephalic and atraumatic. Eyes:      General: No scleral icterus. Conjunctiva/sclera: Conjunctivae normal.   Cardiovascular:      Rate and Rhythm: Normal rate. Pulmonary:      Effort: Pulmonary effort is normal. No respiratory distress. Abdominal:      General: Bowel sounds are normal. There is no distension. Palpations: Abdomen is soft. Tenderness: There is abdominal tenderness. There is no guarding or rebound. Skin:     General: Skin is warm and dry.    Neurological:      General: No focal deficit present. Mental Status: She is alert and oriented to person, place, and time. Psychiatric:         Mood and Affect: Mood normal.         Behavior: Behavior normal.       Lab Results:   No results displayed because visit has over 200 results. Imaging Studies: I have personally reviewed pertinent imaging studies. Erika Landa PA-C    **Please note:  Dictation voice to text software may have been used in the creation of this record. Occasional wrong word or “sound alike” substitutions may have occurred due to the inherent limitations of voice recognition software. Read the chart carefully and recognize, using context, where substitutions have occurred. **

## 2023-08-29 NOTE — PROGRESS NOTES
[address]  Progress Note  Name: Kiya Jones  MRN: [id number]  Unit/Bed#: 312-48 I Date of Admission: 8/25/2023   Date of Service: 8/29/2023 I Hospital Day: 3    Assessment/Plan   * Sigmoid diverticulitis  Assessment & Plan  · Presented to the emergency room with complaints of abdominal pain. Associated with fever, nausea, and non-bloody, mucus loose stools  · Patient was admitted from 8/13-8/16 for jejunal diverticulitis. Was being treated with PO Augmentin as outpatient  · CT abd/pelvis: Interval increased diffuse sigmoid wall thickening with adjacent inflammatory change, most concerning for acute diverticulitis. No fluid collections. 2. Improving inflamed jejunal diverticulum. · Remains afebrile, wo leukocytosis  · C/w CLD - still w/ nausea   · Continue Zosyn (D4) for now  · D/w GI pending continuation vs monitoring off systemic abx as concerns symptoms may be more related to C diff colitis  · Appreciate GI consult  · May require a General Surgery evaluation during this hospitalization  · The patient has refused surgery for the recurrent diverticulitis in the past.      C. difficile colitis  Assessment & Plan  · C diff testing positive  · Continue PO vanc (D2)  · Monitor stool output - still with very frequent BMs        Type 2 diabetes mellitus with hypoglycemia without coma, without long-term current use of insulin ([address])  Assessment & Plan  Lab Results   Component Value Date    HGBA1C 6.0 (H) 08/28/2023       (P) [id number]     · Diet-controlled  · ISS with blood glucose monitoring  · C/w D5NS for now given poor oral intake              VTE Pharmacologic Prophylaxis: VTE Score: 5 heparin    Patient Centered Rounds: I performed bedside rounds with nursing staff today. Discussions with Specialists or Other Care Team Provider:     Education and Discussions with Family / Patient: Patient declined call to .      Total Time Spent on Date of Encounter in care of patient: 35 minutes This time was spent on one or more of the following: performing physical exam; counseling and coordination of care; obtaining or reviewing history; documenting in the medical record; reviewing/ordering tests, medications or procedures; communicating with other healthcare professionals and discussing with patient's family/caregivers. Current Length of Stay: 3 day(s)  Current Patient Status: Inpatient   Certification Statement: The patient will continue to require additional inpatient hospital stay due to tx of C diff colitis, diet advancement, IVF hydration, IV abx, GI consult  Discharge Plan: Anticipate discharge in 48-72 hrs to home. Code Status: Level 1 - Full Code    Subjective:   Reports ongoing high frequency of BMs. Almost every 15-20 minutes per patient. Still with mucus, no blood. Associated nausea, decreased oral intake. Remains febrile    Objective:   Vitals:   Temp (24hrs), Av.1 °F (36.7 °C), Min:97.8 °F (36.6 °C), Max:98.3 °F (36.8 °C)    Temp:  [97.8 °F (36.6 °C)-98.3 °F (36.8 °C)] 98.3 °F (36.8 °C)  HR:  [60-70] 70  Resp:  [16-18] 16  BP: (131-137)/(65-76) 131/65  SpO2:  [93 %-96 %] 93 %  Body mass index is 28.94 kg/m². Input and Output Summary (last 24 hours): Intake/Output Summary (Last 24 hours) at 2023 0952  Last data filed at 2023 0601  Gross per 24 hour   Intake 2480 ml   Output --   Net 2480 ml       Physical Exam:   Physical Exam  HENT:      Head: Normocephalic and atraumatic. Cardiovascular:      Rate and Rhythm: Normal rate and regular rhythm. Pulses: Normal pulses. Heart sounds: Normal heart sounds. Pulmonary:      Effort: Pulmonary effort is normal. No respiratory distress. Breath sounds: Normal breath sounds. Abdominal:      General: Abdomen is flat. Bowel sounds are normal.      Palpations: Abdomen is soft. Tenderness: There is abdominal tenderness (suprapubic, LLQ, periumbilical mild TTP).    Musculoskeletal: Right lower leg: No edema. Left lower leg: No edema. Skin:     General: Skin is warm and dry. Neurological:      General: No focal deficit present. Mental Status: She is alert and oriented to person, place, and time. Psychiatric:         Mood and Affect: Mood normal.         Behavior: Behavior normal.          Additional Data:   Labs:  Results from last 7 days   Lab Units 08/29/23  0622   WBC Thousand/uL 4.49   HEMOGLOBIN g/dL 13.0   HEMATOCRIT % 40.4   PLATELETS Thousands/uL 309   NEUTROS PCT % 60   LYMPHS PCT % 20   MONOS PCT % 15*   EOS PCT % 4     Results from last 7 days   Lab Units 08/29/23  0622   SODIUM mmol/L 141   POTASSIUM mmol/L 3.6   CHLORIDE mmol/L 108   CO2 mmol/L 26   BUN mg/dL 4*   CREATININE mg/dL 0.76   ANION GAP mmol/L 7   CALCIUM mg/dL 8.6   ALBUMIN g/dL 3.3*   TOTAL BILIRUBIN mg/dL 0.57   ALK PHOS U/L 81   ALT U/L 8   AST U/L 12*   GLUCOSE RANDOM mg/dL 116         Results from last 7 days   Lab Units 08/29/23  0704 08/28/23  2100 08/28/23  1552 08/28/23  1133 08/28/23  0721 08/27/23  2116 08/27/23  1649 08/27/23  1405 08/27/23  0021 08/26/23  1907 08/26/23  1102 08/26/23  0700   POC GLUCOSE mg/dl 119 153* 90 128 102 81 142* 64* 100 89 114 118     Results from last 7 days   Lab Units 08/28/23  0529   HEMOGLOBIN A1C % 6.0*     Results from last 7 days   Lab Units 08/28/23  0529 08/25/23  1814   LACTIC ACID mmol/L 0.8 0.8   PROCALCITONIN ng/ml 0.20  --        Lines/Drains:  Invasive Devices     Peripheral Intravenous Line  Duration           Peripheral IV 08/27/23 Ventral (anterior); Right Forearm 1 day    Peripheral IV 08/28/23 Distal;Right;Ventral (anterior) Forearm <1 day                      Imaging: No pertinent imaging reviewed.     Recent Cultures (last 7 days):   Results from last 7 days   Lab Units 08/25/23  2357   C DIFF TOXIN B BY PCR  Positive*       Last 24 Hours Medication List:   Current Facility-Administered Medications   Medication Dose Route Frequency Provider Last Rate   • acetaminophen  650 mg Oral Q6H PRN Shaun Calvillo PA-C     • ALPRAZolam  0.25 mg Oral BID PRN Priscila Stewart MD     • aspirin  81 mg Oral Daily Shaun Calvillo PA-C     • dextrose 5 % and sodium chloride 0.9 % with KCl 20 mEq/L  75 mL/hr Intravenous Continuous Valarie Ok [place], DO 75 mL/hr (08/28/23 2050)   • dextrose  25 mL Intravenous Daily PRN Valarie Ok [place], DO     • dicyclomine  10 mg Oral 4x Daily PRN Priscila Stewart MD     • heparin (porcine)  5,000 Units Subcutaneous Q8H [address] Shaun Calvillo PA-C     • levothyroxine  88 mcg Oral Early Morning Shaun Calvillo PA-C     • nicotine  1 patch Transdermal Daily Shaun Calvillo PA-C     • piperacillin-tazobactam  3.375 g Intravenous Q6H Marquez Espinoza PA-C 3.375 g (08/29/23 0452)   • pravastatin  80 mg Oral Daily With Dinner Shaun Calvillo PA-C     • saccharomyces boulardii  250 mg Oral BID Yoselin Martínez PA-C     • trimethobenzamide  200 mg Intramuscular Q6H PRN Marquez Espinoza PA-C     • vancomycin oral (capsules or solution)  125 mg Oral Q6H Milo Byrnes PA-C          Today, Patient Was Seen By: Misael Ann PA-C    **Please Note: This note may have been constructed using a voice recognition system. **

## 2023-08-30 LAB
ANION GAP SERPL CALCULATED.3IONS-SCNC: 8 MMOL/L
BASOPHILS # BLD AUTO: 0.03 THOUSANDS/ÂΜL (ref 0–0.1)
BASOPHILS NFR BLD AUTO: 1 % (ref 0–1)
BUN SERPL-MCNC: 2 MG/DL (ref 5–25)
CALCIUM SERPL-MCNC: 8.1 MG/DL (ref 8.4–10.2)
CHLORIDE SERPL-SCNC: 109 MMOL/L (ref 96–108)
CO2 SERPL-SCNC: 24 MMOL/L (ref 21–32)
CREAT SERPL-MCNC: 0.62 MG/DL (ref 0.6–1.3)
EOSINOPHIL # BLD AUTO: 0.15 THOUSAND/ÂΜL (ref 0–0.61)
EOSINOPHIL NFR BLD AUTO: 4 % (ref 0–6)
ERYTHROCYTE [DISTWIDTH] IN BLOOD BY AUTOMATED COUNT: 14.2 % (ref 11.6–15.1)
GFR SERPL CREATININE-BSD FRML MDRD: 83 ML/MIN/1.73SQ M
GLUCOSE SERPL-MCNC: 101 MG/DL (ref 65–140)
GLUCOSE SERPL-MCNC: 108 MG/DL (ref 65–140)
GLUCOSE SERPL-MCNC: 111 MG/DL (ref 65–140)
GLUCOSE SERPL-MCNC: 122 MG/DL (ref 65–140)
GLUCOSE SERPL-MCNC: 132 MG/DL (ref 65–140)
HCT VFR BLD AUTO: 35.1 % (ref 34.8–46.1)
HGB BLD-MCNC: 11.6 G/DL (ref 11.5–15.4)
IMM GRANULOCYTES # BLD AUTO: 0.04 THOUSAND/UL (ref 0–0.2)
IMM GRANULOCYTES NFR BLD AUTO: 1 % (ref 0–2)
LYMPHOCYTES # BLD AUTO: 0.88 THOUSANDS/ÂΜL (ref 0.6–4.47)
LYMPHOCYTES NFR BLD AUTO: 23 % (ref 14–44)
MAGNESIUM SERPL-MCNC: 1.4 MG/DL (ref 1.9–2.7)
MCH RBC QN AUTO: 28.9 PG (ref 26.8–34.3)
MCHC RBC AUTO-ENTMCNC: 33 G/DL (ref 31.4–37.4)
MCV RBC AUTO: 87 FL (ref 82–98)
MONOCYTES # BLD AUTO: 0.68 THOUSAND/ÂΜL (ref 0.17–1.22)
MONOCYTES NFR BLD AUTO: 17 % (ref 4–12)
NEUTROPHILS # BLD AUTO: 2.13 THOUSANDS/ÂΜL (ref 1.85–7.62)
NEUTS SEG NFR BLD AUTO: 54 % (ref 43–75)
NRBC BLD AUTO-RTO: 0 /100 WBCS
PLATELET # BLD AUTO: 288 THOUSANDS/UL (ref 149–390)
PMV BLD AUTO: 10.4 FL (ref 8.9–12.7)
POTASSIUM SERPL-SCNC: 3.1 MMOL/L (ref 3.5–5.3)
RBC # BLD AUTO: 4.02 MILLION/UL (ref 3.81–5.12)
SODIUM SERPL-SCNC: 141 MMOL/L (ref 135–147)
WBC # BLD AUTO: 3.91 THOUSAND/UL (ref 4.31–10.16)

## 2023-08-30 PROCEDURE — 82948 REAGENT STRIP/BLOOD GLUCOSE: CPT

## 2023-08-30 PROCEDURE — 80048 BASIC METABOLIC PNL TOTAL CA: CPT

## 2023-08-30 PROCEDURE — 99232 SBSQ HOSP IP/OBS MODERATE 35: CPT

## 2023-08-30 PROCEDURE — 85025 COMPLETE CBC W/AUTO DIFF WBC: CPT

## 2023-08-30 PROCEDURE — 83735 ASSAY OF MAGNESIUM: CPT

## 2023-08-30 PROCEDURE — 99232 SBSQ HOSP IP/OBS MODERATE 35: CPT | Performed by: STUDENT IN AN ORGANIZED HEALTH CARE EDUCATION/TRAINING PROGRAM

## 2023-08-30 RX ORDER — MAGNESIUM SULFATE HEPTAHYDRATE 40 MG/ML
2 INJECTION, SOLUTION INTRAVENOUS ONCE
Status: COMPLETED | OUTPATIENT
Start: 2023-08-30 | End: 2023-08-30

## 2023-08-30 RX ORDER — POTASSIUM CHLORIDE 20 MEQ/1
40 TABLET, EXTENDED RELEASE ORAL 2 TIMES DAILY
Status: COMPLETED | OUTPATIENT
Start: 2023-08-30 | End: 2023-08-30

## 2023-08-30 RX ADMIN — POTASSIUM CHLORIDE 40 MEQ: 1500 TABLET, EXTENDED RELEASE ORAL at 10:04

## 2023-08-30 RX ADMIN — HEPARIN SODIUM 5000 UNITS: 5000 INJECTION INTRAVENOUS; SUBCUTANEOUS at 13:25

## 2023-08-30 RX ADMIN — VANCOMYCIN HYDROCHLORIDE 125 MG: 125 CAPSULE ORAL at 05:33

## 2023-08-30 RX ADMIN — MAGNESIUM SULFATE HEPTAHYDRATE 2 G: 40 INJECTION, SOLUTION INTRAVENOUS at 10:04

## 2023-08-30 RX ADMIN — VANCOMYCIN HYDROCHLORIDE 125 MG: 125 CAPSULE ORAL at 13:25

## 2023-08-30 RX ADMIN — DEXTROSE, SODIUM CHLORIDE, AND POTASSIUM CHLORIDE 75 ML/HR: 5; .9; .15 INJECTION INTRAVENOUS at 17:00

## 2023-08-30 RX ADMIN — HEPARIN SODIUM 5000 UNITS: 5000 INJECTION INTRAVENOUS; SUBCUTANEOUS at 05:33

## 2023-08-30 RX ADMIN — ALPRAZOLAM 0.25 MG: 0.25 TABLET ORAL at 21:39

## 2023-08-30 RX ADMIN — Medication 250 MG: at 10:04

## 2023-08-30 RX ADMIN — VANCOMYCIN HYDROCHLORIDE 125 MG: 125 CAPSULE ORAL at 00:35

## 2023-08-30 RX ADMIN — Medication 250 MG: at 17:30

## 2023-08-30 RX ADMIN — VANCOMYCIN HYDROCHLORIDE 125 MG: 125 CAPSULE ORAL at 17:30

## 2023-08-30 RX ADMIN — ASPIRIN 81 MG: 81 TABLET, COATED ORAL at 10:05

## 2023-08-30 RX ADMIN — DEXTROSE, SODIUM CHLORIDE, AND POTASSIUM CHLORIDE 75 ML/HR: 5; .9; .15 INJECTION INTRAVENOUS at 00:59

## 2023-08-30 RX ADMIN — LEVOTHYROXINE SODIUM 88 MCG: 88 TABLET ORAL at 05:33

## 2023-08-30 RX ADMIN — PRAVASTATIN SODIUM 80 MG: 40 TABLET ORAL at 17:30

## 2023-08-30 RX ADMIN — HEPARIN SODIUM 5000 UNITS: 5000 INJECTION INTRAVENOUS; SUBCUTANEOUS at 21:39

## 2023-08-30 RX ADMIN — POTASSIUM CHLORIDE 40 MEQ: 1500 TABLET, EXTENDED RELEASE ORAL at 17:30

## 2023-08-30 RX ADMIN — DICYCLOMINE HYDROCHLORIDE 10 MG: 10 CAPSULE ORAL at 10:05

## 2023-08-30 NOTE — ASSESSMENT & PLAN NOTE
· K 3.1, Mag 1.4  · Monitor & replete p.r.n.     Results from last 7 days   Lab Units 08/30/23  0433 08/29/23  0622 08/28/23  0529   SODIUM mmol/L 141 141 139   POTASSIUM mmol/L 3.1* 3.6 3.4*   CHLORIDE mmol/L 109* 108 107   CO2 mmol/L 24 26 27   BUN mg/dL 2* 4* 7   CREATININE mg/dL 0.62 0.76 0.67   CALCIUM mg/dL 8.1* 8.6 8.5

## 2023-08-30 NOTE — PROGRESS NOTES
[address]  Progress Note  Name: Josseline Brewster  MRN: [id number]  Unit/Bed#: 310-93 I Date of Admission: 8/25/2023   Date of Service: 8/30/2023 I Hospital Day: 4    Assessment/Plan   * C. difficile colitis  Assessment & Plan  · C diff testing positive  · Continue PO vanc (D3)  · Monitor stool output - still with very frequent BMs, addition of Banatrol today  · Advancement of diet to bland, low residue  · Low dose bentyl p.r.n for cramping  · Appreciate ongoing GI recs        Sigmoid diverticulitis  Assessment & Plan  · Presented to the emergency room with complaints of abdominal pain. Associated with fever, nausea, and non-bloody, mucus loose stools  · Patient was admitted from 8/13-8/16 for jejunal diverticulitis. Was being treated with PO Augmentin as outpatient  · CT abd/pelvis: Interval increased diffuse sigmoid wall thickening with adjacent inflammatory change, most concerning for acute diverticulitis. No fluid collections. 2. Improving inflamed jejunal diverticulum. · Remains afebrile, wo leukocytosis  · C/w CLD - still w/ nausea   · Zosyn discontinued yesterday   · May require a General Surgery evaluation during this hospitalization  · The patient has refused surgery for the recurrent diverticulitis in the past.  · Appreciate GI consult      Hypokalemia  Assessment & Plan  · K 3.1, Mag 1.4  · Monitor & replete p.r.n.     Results from last 7 days   Lab Units 08/30/23  0433 08/29/23  0622 08/28/23  0529   SODIUM mmol/L 141 141 139   POTASSIUM mmol/L 3.1* 3.6 3.4*   CHLORIDE mmol/L 109* 108 107   CO2 mmol/L 24 26 27   BUN mg/dL 2* 4* 7   CREATININE mg/dL 0.62 0.76 0.67   CALCIUM mg/dL 8.1* 8.6 8.5         Type 2 diabetes mellitus with hypoglycemia without coma, without long-term current use of insulin [facility])  Assessment & Plan  Lab Results   Component Value Date    HGBA1C 6.0 (H) 08/28/2023       (P) [id number]     · Diet-controlled  · ISS with blood glucose monitoring  · C/w D5NS for now given poor oral intake   · Advancing diet to bland, low residue today              VTE Pharmacologic Prophylaxis: VTE Score: 5 heparin    Patient Centered Rounds: I performed bedside rounds with nursing staff today. Discussions with Specialists or Other Care Team Provider: GI    Education and Discussions with Family / Patient: Updated  (daughter) via phone. Total Time Spent on Date of Encounter in care of patient: 35 minutes This time was spent on one or more of the following: performing physical exam; counseling and coordination of care; obtaining or reviewing history; documenting in the medical record; reviewing/ordering tests, medications or procedures; communicating with other healthcare professionals and discussing with patient's family/caregivers. Current Length of Stay: 4 day(s)  Current Patient Status: Inpatient   Certification Statement: The patient will continue to require additional inpatient hospital stay due to C diff colitis treatment, improvement of diarrhea, ongoing electrolyte abnormalities  Discharge Plan: Anticipate discharge in 48-72 hrs to home. Code Status: Level 1 - Full Code    Subjective:   Reports no improvement in diarrhea today. Still with very frequent BMs, abdominal cramping, pressure in rectum. Does note nausea has improved, feels ready to start advancing diet today    Objective:   Vitals:   Temp (24hrs), Av.9 °F (36.6 °C), Min:97.9 °F (36.6 °C), Max:97.9 °F (36.6 °C)    Temp:  [97.9 °F (36.6 °C)] 97.9 °F (36.6 °C)  HR:  [63-70] 70  Resp:  [16-18] 16  BP: (122-144)/(69-75) 127/75  SpO2:  [92 %-95 %] 92 %  Body mass index is 29.64 kg/m². Input and Output Summary (last 24 hours): Intake/Output Summary (Last 24 hours) at 2023 1112  Last data filed at 2023 0900  Gross per 24 hour   Intake 360 ml   Output --   Net 360 ml       Physical Exam:   Physical Exam  HENT:      Head: Normocephalic and atraumatic.    Cardiovascular:      Rate and Rhythm: Normal rate and regular rhythm. Pulses: Normal pulses. Heart sounds: Normal heart sounds. Pulmonary:      Effort: Pulmonary effort is normal.      Breath sounds: Normal breath sounds. Abdominal:      General: Abdomen is flat. Bowel sounds are normal. There is no distension. Palpations: Abdomen is soft. Tenderness: There is no abdominal tenderness. Musculoskeletal:      Right lower leg: No edema. Left lower leg: No edema. Skin:     General: Skin is warm and dry. Neurological:      General: No focal deficit present. Mental Status: She is alert and oriented to person, place, and time.    Psychiatric:         Mood and Affect: Mood normal.         Behavior: Behavior normal.          Additional Data:   Labs:  Results from last 7 days   Lab Units 08/30/23  0433   WBC Thousand/uL 3.91*   HEMOGLOBIN g/dL 11.6   HEMATOCRIT % 35.1   PLATELETS Thousands/uL 288   NEUTROS PCT % 54   LYMPHS PCT % 23   MONOS PCT % 17*   EOS PCT % 4     Results from last 7 days   Lab Units 08/30/23  0433 08/29/23  0622   SODIUM mmol/L 141 141   POTASSIUM mmol/L 3.1* 3.6   CHLORIDE mmol/L 109* 108   CO2 mmol/L 24 26   BUN mg/dL 2* 4*   CREATININE mg/dL 0.62 0.76   ANION GAP mmol/L 8 7   CALCIUM mg/dL 8.1* 8.6   ALBUMIN g/dL  --  3.3*   TOTAL BILIRUBIN mg/dL  --  0.57   ALK PHOS U/L  --  81   ALT U/L  --  8   AST U/L  --  12*   GLUCOSE RANDOM mg/dL 122 116         Results from last 7 days   Lab Units 08/30/23  0743 08/29/23  2104 08/29/23  1545 08/29/23  1031 08/29/23  0704 08/28/23  2100 08/28/23  1552 08/28/23  1133 08/28/23  0721 08/27/23  2116 08/27/23  1649 08/27/23  1405   POC GLUCOSE mg/dl 111 103 100 184* 119 153* 90 128 102 81 142* 64*     Results from last 7 days   Lab Units 08/28/23  0529   HEMOGLOBIN A1C % 6.0*     Results from last 7 days   Lab Units 08/28/23  0529 08/25/23  1814   LACTIC ACID mmol/L 0.8 0.8   PROCALCITONIN ng/ml 0.20  --        Lines/Drains:  Invasive Devices Peripheral Intravenous Line  Duration           Peripheral IV 08/27/23 Ventral (anterior); Right Forearm 2 days    Peripheral IV 08/28/23 Distal;Right;Ventral (anterior) Forearm 1 day                      Imaging: No pertinent imaging reviewed. Recent Cultures (last 7 days):   Results from last 7 days   Lab Units 08/25/23  2357   C DIFF TOXIN B BY PCR  Positive*       Last 24 Hours Medication List:   Current Facility-Administered Medications   Medication Dose Route Frequency Provider Last Rate   • acetaminophen  650 mg Oral Q6H PRN Shaun Calvillo PA-C     • ALPRAZolam  0.25 mg Oral BID PRN Neville Min MD     • aspirin  81 mg Oral Daily Shaun Calvillo PA-C     • dextrose 5 % and sodium chloride 0.9 % with KCl 20 mEq/L  75 mL/hr Intravenous Continuous Jeremiah Aldana DO 75 mL/hr (08/30/23 0059)   • dextrose  25 mL Intravenous Daily PRN Jeremiah Aldana DO     • dicyclomine  10 mg Oral 4x Daily PRN Neville Min MD     • heparin (porcine)  5,000 Units Subcutaneous Q8H [facility] Shaun Calvillo PA-C     • levothyroxine  88 mcg Oral Early Morning Shaun Calvillo PA-C     • magnesium sulfate  2 g Intravenous Once Yoselin Gray PA-C 2 g (08/30/23 1004)   • nicotine  1 patch Transdermal Daily Shaun Calvillo PA-C     • potassium chloride  40 mEq Oral BID Yoselin Gray PA-C     • pravastatin  80 mg Oral Daily With Dinner Shaun Calvillo PA-C     • saccharomyces boulardii  250 mg Oral BID Yoselin Gray PA-C     • trimethobenzamide  200 mg Intramuscular Q6H PRN Arianna Dawkins PA-C     • vancomycin oral (capsules or solution)  125 mg Oral Q6H [facility] Yoselin Gray PA-C          Today, Patient Was Seen By: Alfrieda Carrel, PA-C    **Please Note: This note may have been constructed using a voice recognition system. **

## 2023-08-30 NOTE — ASSESSMENT & PLAN NOTE
· C diff testing positive  · Continue PO vanc (D3)  · Monitor stool output - still with very frequent BMs, addition of Banatrol today  · Advancement of diet to bland, low residue  · Low dose bentyl p.r.n for cramping  · Appreciate ongoing GI recs

## 2023-08-30 NOTE — ASSESSMENT & PLAN NOTE
· Presented to the emergency room with complaints of abdominal pain. Associated with fever, nausea, and non-bloody, mucus loose stools  · Patient was admitted from 8/13-8/16 for jejunal diverticulitis. Was being treated with PO Augmentin as outpatient  · CT abd/pelvis: Interval increased diffuse sigmoid wall thickening with adjacent inflammatory change, most concerning for acute diverticulitis. No fluid collections. 2. Improving inflamed jejunal diverticulum.   · Remains afebrile, wo leukocytosis  · C/w CLD - still w/ nausea   · Zosyn discontinued yesterday   · May require a General Surgery evaluation during this hospitalization  · The patient has refused surgery for the recurrent diverticulitis in the past.  · Appreciate GI consult

## 2023-08-30 NOTE — ASSESSMENT & PLAN NOTE
Lab Results   Component Value Date    HGBA1C 6.0 (H) 08/28/2023       (P) [id number]     · Diet-controlled  · ISS with blood glucose monitoring  · C/w D5NS for now given poor oral intake   · Advancing diet to bland, low residue today

## 2023-08-30 NOTE — PLAN OF CARE
Problem: PAIN - ADULT  Goal: Verbalizes/displays adequate comfort level or baseline comfort level  Description: Interventions:  - Encourage patient to monitor pain and request assistance  - Assess pain using appropriate pain scale  - Administer analgesics based on type and severity of pain and evaluate response  - Implement non-pharmacological measures as appropriate and evaluate response  - Consider cultural and social influences on pain and pain management  - Notify physician/advanced practitioner if interventions unsuccessful or patient reports new pain  Outcome: Progressing     Problem: INFECTION - ADULT  Goal: Absence or prevention of progression during hospitalization  Description: INTERVENTIONS:  - Assess and monitor for signs and symptoms of infection  - Monitor lab/diagnostic results  - Monitor all insertion sites, i.e. indwelling lines, tubes, and drains  - Monitor endotracheal if appropriate and nasal secretions for changes in amount and color  - [place] appropriate cooling/warming therapies per order  - Administer medications as ordered  - Instruct and encourage patient and family to use good hand hygiene technique  - Identify and instruct in appropriate isolation precautions for identified infection/condition  Outcome: Progressing  Goal: Absence of fever/infection during neutropenic period  Description: INTERVENTIONS:  - Monitor WBC    Outcome: Progressing     Problem: SAFETY ADULT  Goal: Patient will remain free of falls  Description: INTERVENTIONS:  - Educate patient/family on patient safety including physical limitations  - Instruct patient to call for assistance with activity   - Consult OT/PT to assist with strengthening/mobility   - Keep Call bell within reach  - Keep bed low and locked with side rails adjusted as appropriate  - Keep care items and personal belongings within reach  - Initiate and maintain comfort rounds  - Make Fall Risk Sign visible to staff  - Offer Toileting every 2 Hours, in advance of need  - Initiate/Maintain Bed/chair alarm  - Obtain necessary fall risk management equipment:   - Apply yellow socks and bracelet for high fall risk patients  - Consider moving patient to room near nurses station  Outcome: Progressing  Goal: Maintain or return to baseline ADL function  Description: INTERVENTIONS:  -  Assess patient's ability to carry out ADLs; assess patient's baseline for ADL function and identify physical deficits which impact ability to perform ADLs (bathing, care of mouth/teeth, toileting, grooming, dressing, etc.)  - Assess/evaluate cause of self-care deficits   - Assess range of motion  - Assess patient's mobility; develop plan if impaired  - Assess patient's need for assistive devices and provide as appropriate  - Encourage maximum independence but intervene and supervise when necessary  - Involve family in performance of ADLs  - Assess for home care needs following discharge   - Consider OT consult to assist with ADL evaluation and planning for discharge  - Provide patient education as appropriate  Outcome: Progressing  Goal: Maintains/Returns to pre admission functional level  Description: INTERVENTIONS:  - Perform BMAT or MOVE assessment daily.   - Set and communicate daily mobility goal to care team and patient/family/caregiver. - Collaborate with rehabilitation services on mobility goals if consulted  - Perform Range of Motion 3 times a day. - Reposition patient every 2 hours.   - Dangle patient 3 times a day  - Stand patient 3 times a day  - Ambulate patient 3 times a day  - Out of bed to chair 3 times a day   - Out of bed for meals 3 times a day  - Out of bed for toileting  - Record patient progress and toleration of activity level   Outcome: Progressing     Problem: DISCHARGE PLANNING  Goal: Discharge to home or other facility with appropriate resources  Description: INTERVENTIONS:  - Identify barriers to discharge w/patient and caregiver  - Arrange for needed discharge resources and transportation as appropriate  - Identify discharge learning needs (meds, wound care, etc.)  - Arrange for interpretive services to assist at discharge as needed  - Refer to Case Management Department for coordinating discharge planning if the patient needs post-hospital services based on physician/advanced practitioner order or complex needs related to functional status, cognitive ability, or social support system  Outcome: Progressing     Problem: Knowledge Deficit  Goal: Patient/family/caregiver demonstrates understanding of disease process, treatment plan, medications, and discharge instructions  Description: Complete learning assessment and assess knowledge base.   Interventions:  - Provide teaching at level of understanding  - Provide teaching via preferred learning methods  Outcome: Progressing     Problem: GASTROINTESTINAL - ADULT  Goal: Minimal or absence of nausea and/or vomiting  Description: INTERVENTIONS:  - Administer IV fluids if ordered to ensure adequate hydration  - Maintain NPO status until nausea and vomiting are resolved  - Nasogastric tube if ordered  - Administer ordered antiemetic medications as needed  - Provide nonpharmacologic comfort measures as appropriate  - Advance diet as tolerated, if ordered  - Consider nutrition services referral to assist patient with adequate nutrition and appropriate food choices  Outcome: Progressing  Goal: Maintains or returns to baseline bowel function  Description: INTERVENTIONS:  - Assess bowel function  - Encourage oral fluids to ensure adequate hydration  - Administer IV fluids if ordered to ensure adequate hydration  - Administer ordered medications as needed  - Encourage mobilization and activity  - Consider nutritional services referral to assist patient with adequate nutrition and appropriate food choices  Outcome: Progressing

## 2023-08-30 NOTE — PLAN OF CARE
Problem: PAIN - ADULT  Goal: Verbalizes/displays adequate comfort level or baseline comfort level  Description: Interventions:  - Encourage patient to monitor pain and request assistance  - Assess pain using appropriate pain scale  - Administer analgesics based on type and severity of pain and evaluate response  - Implement non-pharmacological measures as appropriate and evaluate response  - Consider cultural and social influences on pain and pain management  - Notify physician/advanced practitioner if interventions unsuccessful or patient reports new pain  Outcome: Progressing     Problem: INFECTION - ADULT  Goal: Absence or prevention of progression during hospitalization  Description: INTERVENTIONS:  - Assess and monitor for signs and symptoms of infection  - Monitor lab/diagnostic results  - Monitor all insertion sites, i.e. indwelling lines, tubes, and drains  - Monitor endotracheal if appropriate and nasal secretions for changes in amount and color  - [place] appropriate cooling/warming therapies per order  - Administer medications as ordered  - Instruct and encourage patient and family to use good hand hygiene technique  - Identify and instruct in appropriate isolation precautions for identified infection/condition  Outcome: Progressing  Goal: Absence of fever/infection during neutropenic period  Description: INTERVENTIONS:  - Monitor WBC    Outcome: Progressing     Problem: SAFETY ADULT  Goal: Patient will remain free of falls  Description: INTERVENTIONS:  - Educate patient/family on patient safety including physical limitations  - Instruct patient to call for assistance with activity   - Consult OT/PT to assist with strengthening/mobility   - Keep Call bell within reach  - Keep bed low and locked with side rails adjusted as appropriate  - Keep care items and personal belongings within reach  - Initiate and maintain comfort rounds  - Make Fall Risk Sign visible to staff  - Offer Toileting every 2 Hours, in advance of need  - Initiate/Maintain bed alarm  - Obtain necessary fall risk management equipment: non skid socks  - Apply yellow socks and bracelet for high fall risk patients  - Consider moving patient to room near nurses station  Outcome: Progressing  Goal: Maintain or return to baseline ADL function  Description: INTERVENTIONS:  -  Assess patient's ability to carry out ADLs; assess patient's baseline for ADL function and identify physical deficits which impact ability to perform ADLs (bathing, care of mouth/teeth, toileting, grooming, dressing, etc.)  - Assess/evaluate cause of self-care deficits   - Assess range of motion  - Assess patient's mobility; develop plan if impaired  - Assess patient's need for assistive devices and provide as appropriate  - Encourage maximum independence but intervene and supervise when necessary  - Involve family in performance of ADLs  - Assess for home care needs following discharge   - Consider OT consult to assist with ADL evaluation and planning for discharge  - Provide patient education as appropriate  Outcome: Progressing  Goal: Maintains/Returns to pre admission functional level  Description: INTERVENTIONS:  - Perform BMAT or MOVE assessment daily.   - Set and communicate daily mobility goal to care team and patient/family/caregiver. - Collaborate with rehabilitation services on mobility goals if consulted  - Perform Range of Motion 2 times a day. - Reposition patient every 2 hours.   - Dangle patient 2 times a day  - Stand patient 2 times a day  - Ambulate patient 2 times a day  - Out of bed to chair 2 times a day   - Out of bed for meals 2 times a day  - Out of bed for toileting  - Record patient progress and toleration of activity level   Outcome: Progressing     Problem: DISCHARGE PLANNING  Goal: Discharge to home or other facility with appropriate resources  Description: INTERVENTIONS:  - Identify barriers to discharge w/patient and caregiver  - Arrange for needed discharge resources and transportation as appropriate  - Identify discharge learning needs (meds, wound care, etc.)  - Arrange for interpretive services to assist at discharge as needed  - Refer to Case Management Department for coordinating discharge planning if the patient needs post-hospital services based on physician/advanced practitioner order or complex needs related to functional status, cognitive ability, or social support system  Outcome: Progressing     Problem: Knowledge Deficit  Goal: Patient/family/caregiver demonstrates understanding of disease process, treatment plan, medications, and discharge instructions  Description: Complete learning assessment and assess knowledge base.   Interventions:  - Provide teaching at level of understanding  - Provide teaching via preferred learning methods  Outcome: Progressing     Problem: GASTROINTESTINAL - ADULT  Goal: Minimal or absence of nausea and/or vomiting  Description: INTERVENTIONS:  - Administer IV fluids if ordered to ensure adequate hydration  - Maintain NPO status until nausea and vomiting are resolved  - Nasogastric tube if ordered  - Administer ordered antiemetic medications as needed  - Provide nonpharmacologic comfort measures as appropriate  - Advance diet as tolerated, if ordered  - Consider nutrition services referral to assist patient with adequate nutrition and appropriate food choices  Outcome: Progressing  Goal: Maintains or returns to baseline bowel function  Description: INTERVENTIONS:  - Assess bowel function  - Encourage oral fluids to ensure adequate hydration  - Administer IV fluids if ordered to ensure adequate hydration  - Administer ordered medications as needed  - Encourage mobilization and activity  - Consider nutritional services referral to assist patient with adequate nutrition and appropriate food choices  Outcome: Progressing

## 2023-08-30 NOTE — PROGRESS NOTES
Progress Note- Bhanu Queen 79 y.o. female MRN: [id number]    Unit/Bed#: 410-01 Encounter: [id number]    Assessment and Plan:    78 y/o F w/ pmhx sig for HTN, DM 2, hypothyroidism, HLD, diverticulosis with a history of diverticulitis. She was recently in the ED for diverticulitis and treated with Cipro/Flagyl. She was transition to Augmentin and discharged though failed treatment due to intolerance of Flagyl and was readmitted briefly. She was subsequently discharged on a prolonged course of Augmentin. She had worsening diarrhea which prompted her to present to the ER on 8/25/2023. Repeat imaging showed diffuse sigmoid wall thickening with adjacent inflammatory change most concerning for possible acute diverticulitis. She subsequently tested positive for C. difficile and was started on oral vancomycin. She was initially on Zosyn though this was discontinued. She is currently being treated on vancomycin. As of 8/30/2023, Hb 11.6, MCV 87, platelets 986, BUN 2, creatinine 0.62, K, Mg 1.4. Diverticulosis with a history of recurrent diverticulitis  C. difficile infection  Tenesmus  Nausea    · As we previously discussed, from a GI standpoint we are more suspicious patient's constellation of symptoms are secondary to active C. difficile infection rather than recurrence of diverticulitis. · Continue on vancomycin per protocol. Complete 10-14 day course. · Continue to monitor vital signs, WBC curve, and serial abdominal examinations closely. Continue to monitor stool output closely. · Continue with IV fluid hydration as needed and electrolyte repletion. Continue with supportive care to include PRN anti-emetics. · Okay to advance diet as tolerated. Recommend Banatrol supplementation. · Okay for trial of as needed antispasmodics for rectal spasming. We will continue to follow this patient closely.    ______________________________________________________________________    Subjective:     Patient is a 79 y.o. female with PMHx Sig for HTN, DM 2, hypothyroidism, HLD, diverticulosis with a history of diverticulitis. Admitted on 8/25/2023 worsening diarrhea. Prior to this hospitalized for bout of diverticulitis. Tested positive for C. difficile infection. Interval events:     Continues to complain of rectal cramping and spasming. Having frequent small-volume loose stools, brown in color. Denies BRBPR or melena. Nausea has improved.     Medication Administration - last 24 hours from 08/29/2023 0820 to 08/30/2023 0820       Date/Time Order Dose Route Action Action by     08/29/2023 0927 EDT aspirin (ECOTRIN LOW STRENGTH) EC tablet 81 mg 81 mg Oral Given Shiela Justice RN     08/30/2023 2351 EDT levothyroxine tablet 88 mcg 88 mcg Oral Given Chandrika Almonte     08/29/2023 1711 EDT pravastatin (PRAVACHOL) tablet 80 mg 80 mg Oral Given Shiela Justice RN     08/29/2023 1524 EDT nicotine (NICODERM CQ) 7 mg/24hr TD 24 hr patch 1 patch 1 patch Transdermal Not Given Shiela Justice RN     08/30/2023 0533 EDT heparin (porcine) subcutaneous injection 5,000 Units 5,000 Units Subcutaneous Given Chandrika Almonte     08/29/2023 2110 EDT heparin (porcine) subcutaneous injection 5,000 Units 5,000 Units Subcutaneous Given Shiela Justice RN     08/29/2023 1442 EDT heparin (porcine) subcutaneous injection 5,000 Units 5,000 Units Subcutaneous Given Shiela Justice RN     08/29/2023 1930 EDT piperacillin-tazobactam (ZOSYN) 3.375 g in sodium chloride 0.9 % 100 mL IVPB 0 g Intravenous Stopped Miriam Aguirre RN     08/29/2023 1152 EDT piperacillin-tazobactam (ZOSYN) 3.375 g in sodium chloride 0.9 % 100 mL IVPB 3.375 g Intravenous [address], [place]     08/29/2023 2110 EDT ALPRAZolam Karle Luther) tablet 0.25 mg 0.25 mg Oral Given Shieal Justice RN     08/30/2023 9592 EDT dextrose 5 % and sodium chloride 0.9 % with KCl 20 mEq/L infusion (premix) 75 mL/hr Intravenous [address]  Chandrika Almonte     08/30/2023 0533 EDT vancomycin (VANCOCIN) capsule 125 mg 125 mg Oral Given [facility]     08/30/2023 0035 EDT vancomycin (VANCOCIN) capsule 125 mg 125 mg Oral Given [facility]     08/29/2023 1711 EDT vancomycin (VANCOCIN) capsule 125 mg 125 mg Oral Given Angelina Quinn RN     08/29/2023 1152 EDT vancomycin (VANCOCIN) capsule 125 mg 125 mg Oral Given Angelina Quinn RN     08/29/2023 1711 EDT saccharomyces boulardii (FLORASTOR) capsule 250 mg 250 mg Oral Given Angelina Quinn RN     08/29/2023 8394 EDT saccharomyces boulardii (FLORASTOR) capsule 250 mg 250 mg Oral Given Angelina Quinn RN        Review of Systems   Otherwise Per HPI    Objective:     Vitals: Blood pressure 127/75, pulse 70, temperature 97.9 °F (36.6 °C), resp. rate 16, height 5' 3" (1.6 m), weight 75.9 kg (167 lb 5.3 oz), SpO2 92 %. ,Body mass index is 29.64 kg/m². Intake/Output Summary (Last 24 hours) at 8/30/2023 0820  Last data filed at 8/29/2023 1222  Gross per 24 hour   Intake 120 ml   Output --   Net 120 ml     Physical Exam  Vitals and nursing note reviewed. Constitutional:       Appearance: Normal appearance. HENT:      Head: Normocephalic and atraumatic. Eyes:      General: No scleral icterus. Conjunctiva/sclera: Conjunctivae normal.   Cardiovascular:      Rate and Rhythm: Normal rate. Abdominal:      General: Bowel sounds are normal. There is no distension. Palpations: Abdomen is soft. There is no mass. Tenderness: There is abdominal tenderness. There is no guarding or rebound. Skin:     General: Skin is warm and dry. Coloration: Skin is not jaundiced. Neurological:      General: No focal deficit present. Mental Status: She is alert and oriented to person, place, and time. Psychiatric:         Mood and Affect: Mood normal.         Behavior: Behavior normal.       Invasive Devices     Peripheral Intravenous Line  Duration           Peripheral IV 08/27/23 Ventral (anterior); Right Forearm 2 days    Peripheral IV 08/28/23 Distal;Right;Ventral (anterior) Forearm 1 day              Lab Results:  No results displayed because visit has over 200 results. Imaging Studies: I have personally reviewed pertinent imaging studies. Rosa Tam PA-C    **Please note:  Dictation voice to text software may have been used in the creation of this record. Occasional wrong word or “sound alike” substitutions may have occurred due to the inherent limitations of voice recognition software. Read the chart carefully and recognize, using context, where substitutions have occurred. **

## 2023-08-30 NOTE — PLAN OF CARE
Problem: PAIN - ADULT  Goal: Verbalizes/displays adequate comfort level or baseline comfort level  Description: Interventions:  - Encourage patient to monitor pain and request assistance  - Assess pain using appropriate pain scale  - Administer analgesics based on type and severity of pain and evaluate response  - Implement non-pharmacological measures as appropriate and evaluate response  - Consider cultural and social influences on pain and pain management  - Notify physician/advanced practitioner if interventions unsuccessful or patient reports new pain  Outcome: Progressing     Problem: INFECTION - ADULT  Goal: Absence or prevention of progression during hospitalization  Description: INTERVENTIONS:  - Assess and monitor for signs and symptoms of infection  - Monitor lab/diagnostic results  - Monitor all insertion sites, i.e. indwelling lines, tubes, and drains  - Monitor endotracheal if appropriate and nasal secretions for changes in amount and color  - [place] appropriate cooling/warming therapies per order  - Administer medications as ordered  - Instruct and encourage patient and family to use good hand hygiene technique  - Identify and instruct in appropriate isolation precautions for identified infection/condition  Outcome: Progressing  Goal: Absence of fever/infection during neutropenic period  Description: INTERVENTIONS:  - Monitor WBC    Outcome: Progressing     Problem: SAFETY ADULT  Goal: Patient will remain free of falls  Description: INTERVENTIONS:  - Educate patient/family on patient safety including physical limitations  - Instruct patient to call for assistance with activity   - Consult OT/PT to assist with strengthening/mobility   - Keep Call bell within reach  - Keep bed low and locked with side rails adjusted as appropriate  - Keep care items and personal belongings within reach  - Initiate and maintain comfort rounds  - Make Fall Risk Sign visible to staff  - Offer Toileting every  Hours, in advance of need  - Initiate/Maintain alarm  - Obtain necessary fall risk management equipment:   - Apply yellow socks and bracelet for high fall risk patients  - Consider moving patient to room near nurses station  Outcome: Progressing  Goal: Maintain or return to baseline ADL function  Description: INTERVENTIONS:  -  Assess patient's ability to carry out ADLs; assess patient's baseline for ADL function and identify physical deficits which impact ability to perform ADLs (bathing, care of mouth/teeth, toileting, grooming, dressing, etc.)  - Assess/evaluate cause of self-care deficits   - Assess range of motion  - Assess patient's mobility; develop plan if impaired  - Assess patient's need for assistive devices and provide as appropriate  - Encourage maximum independence but intervene and supervise when necessary  - Involve family in performance of ADLs  - Assess for home care needs following discharge   - Consider OT consult to assist with ADL evaluation and planning for discharge  - Provide patient education as appropriate  Outcome: Progressing  Goal: Maintains/Returns to pre admission functional level  Description: INTERVENTIONS:  - Perform BMAT or MOVE assessment daily.   - Set and communicate daily mobility goal to care team and patient/family/caregiver. - Collaborate with rehabilitation services on mobility goals if consulted  - Perform Range of Motion 3 times a day. - Reposition patient every 2 hours.   - Dangle patient 3 times a day  - Stand patient 3 times a day  - Ambulate patient 3 times a day  - Out of bed to chair 3 times a day   - Out of bed for meals 3 times a day  - Out of bed for toileting  - Record patient progress and toleration of activity level   Outcome: Progressing     Problem: DISCHARGE PLANNING  Goal: Discharge to home or other facility with appropriate resources  Description: INTERVENTIONS:  - Identify barriers to discharge w/patient and caregiver  - Arrange for needed discharge resources and transportation as appropriate  - Identify discharge learning needs (meds, wound care, etc.)  - Arrange for interpretive services to assist at discharge as needed  - Refer to Case Management Department for coordinating discharge planning if the patient needs post-hospital services based on physician/advanced practitioner order or complex needs related to functional status, cognitive ability, or social support system  Outcome: Progressing     Problem: Knowledge Deficit  Goal: Patient/family/caregiver demonstrates understanding of disease process, treatment plan, medications, and discharge instructions  Description: Complete learning assessment and assess knowledge base.   Interventions:  - Provide teaching at level of understanding  - Provide teaching via preferred learning methods  Outcome: Progressing     Problem: GASTROINTESTINAL - ADULT  Goal: Minimal or absence of nausea and/or vomiting  Description: INTERVENTIONS:  - Administer IV fluids if ordered to ensure adequate hydration  - Maintain NPO status until nausea and vomiting are resolved  - Nasogastric tube if ordered  - Administer ordered antiemetic medications as needed  - Provide nonpharmacologic comfort measures as appropriate  - Advance diet as tolerated, if ordered  - Consider nutrition services referral to assist patient with adequate nutrition and appropriate food choices  Outcome: Progressing  Goal: Maintains or returns to baseline bowel function  Description: INTERVENTIONS:  - Assess bowel function  - Encourage oral fluids to ensure adequate hydration  - Administer IV fluids if ordered to ensure adequate hydration  - Administer ordered medications as needed  - Encourage mobilization and activity  - Consider nutritional services referral to assist patient with adequate nutrition and appropriate food choices  Outcome: Progressing

## 2023-08-31 LAB
ANION GAP SERPL CALCULATED.3IONS-SCNC: 8 MMOL/L
BASOPHILS # BLD AUTO: 0.03 THOUSANDS/ÂΜL (ref 0–0.1)
BASOPHILS NFR BLD AUTO: 1 % (ref 0–1)
BUN SERPL-MCNC: 2 MG/DL (ref 5–25)
CALCIUM SERPL-MCNC: 8.6 MG/DL (ref 8.4–10.2)
CHLORIDE SERPL-SCNC: 110 MMOL/L (ref 96–108)
CO2 SERPL-SCNC: 25 MMOL/L (ref 21–32)
CREAT SERPL-MCNC: 0.68 MG/DL (ref 0.6–1.3)
EOSINOPHIL # BLD AUTO: 0.14 THOUSAND/ÂΜL (ref 0–0.61)
EOSINOPHIL NFR BLD AUTO: 3 % (ref 0–6)
ERYTHROCYTE [DISTWIDTH] IN BLOOD BY AUTOMATED COUNT: 14.6 % (ref 11.6–15.1)
GFR SERPL CREATININE-BSD FRML MDRD: 81 ML/MIN/1.73SQ M
GLUCOSE SERPL-MCNC: 107 MG/DL (ref 65–140)
GLUCOSE SERPL-MCNC: 113 MG/DL (ref 65–140)
GLUCOSE SERPL-MCNC: 120 MG/DL (ref 65–140)
GLUCOSE SERPL-MCNC: 120 MG/DL (ref 65–140)
GLUCOSE SERPL-MCNC: 99 MG/DL (ref 65–140)
HCT VFR BLD AUTO: 36.3 % (ref 34.8–46.1)
HGB BLD-MCNC: 12.1 G/DL (ref 11.5–15.4)
IMM GRANULOCYTES # BLD AUTO: 0.06 THOUSAND/UL (ref 0–0.2)
IMM GRANULOCYTES NFR BLD AUTO: 1 % (ref 0–2)
LYMPHOCYTES # BLD AUTO: 1.23 THOUSANDS/ÂΜL (ref 0.6–4.47)
LYMPHOCYTES NFR BLD AUTO: 27 % (ref 14–44)
MAGNESIUM SERPL-MCNC: 1.8 MG/DL (ref 1.9–2.7)
MCH RBC QN AUTO: 29.3 PG (ref 26.8–34.3)
MCHC RBC AUTO-ENTMCNC: 33.3 G/DL (ref 31.4–37.4)
MCV RBC AUTO: 88 FL (ref 82–98)
MONOCYTES # BLD AUTO: 0.81 THOUSAND/ÂΜL (ref 0.17–1.22)
MONOCYTES NFR BLD AUTO: 18 % (ref 4–12)
NEUTROPHILS # BLD AUTO: 2.22 THOUSANDS/ÂΜL (ref 1.85–7.62)
NEUTS SEG NFR BLD AUTO: 50 % (ref 43–75)
NRBC BLD AUTO-RTO: 0 /100 WBCS
PLATELET # BLD AUTO: 303 THOUSANDS/UL (ref 149–390)
PMV BLD AUTO: 10.2 FL (ref 8.9–12.7)
POTASSIUM SERPL-SCNC: 3.8 MMOL/L (ref 3.5–5.3)
RBC # BLD AUTO: 4.13 MILLION/UL (ref 3.81–5.12)
SODIUM SERPL-SCNC: 143 MMOL/L (ref 135–147)
WBC # BLD AUTO: 4.49 THOUSAND/UL (ref 4.31–10.16)

## 2023-08-31 PROCEDURE — 85025 COMPLETE CBC W/AUTO DIFF WBC: CPT

## 2023-08-31 PROCEDURE — 83735 ASSAY OF MAGNESIUM: CPT

## 2023-08-31 PROCEDURE — 82948 REAGENT STRIP/BLOOD GLUCOSE: CPT

## 2023-08-31 PROCEDURE — 80048 BASIC METABOLIC PNL TOTAL CA: CPT

## 2023-08-31 PROCEDURE — 99232 SBSQ HOSP IP/OBS MODERATE 35: CPT | Performed by: PHYSICIAN ASSISTANT

## 2023-08-31 RX ORDER — VANCOMYCIN HYDROCHLORIDE 250 MG/1
250 CAPSULE ORAL EVERY 6 HOURS SCHEDULED
Status: DISCONTINUED | OUTPATIENT
Start: 2023-08-31 | End: 2023-09-05

## 2023-08-31 RX ORDER — MAGNESIUM SULFATE 1 G/100ML
1 INJECTION INTRAVENOUS ONCE
Status: COMPLETED | OUTPATIENT
Start: 2023-08-31 | End: 2023-08-31

## 2023-08-31 RX ADMIN — Medication 250 MG: at 17:54

## 2023-08-31 RX ADMIN — VANCOMYCIN HYDROCHLORIDE 125 MG: 125 CAPSULE ORAL at 00:10

## 2023-08-31 RX ADMIN — VANCOMYCIN HYDROCHLORIDE 125 MG: 125 CAPSULE ORAL at 05:15

## 2023-08-31 RX ADMIN — VANCOMYCIN HYDROCHLORIDE 250 MG: 250 CAPSULE ORAL at 23:50

## 2023-08-31 RX ADMIN — ASPIRIN 81 MG: 81 TABLET, COATED ORAL at 08:23

## 2023-08-31 RX ADMIN — ALPRAZOLAM 0.25 MG: 0.25 TABLET ORAL at 21:14

## 2023-08-31 RX ADMIN — LEVOTHYROXINE SODIUM 88 MCG: 88 TABLET ORAL at 05:15

## 2023-08-31 RX ADMIN — HEPARIN SODIUM 5000 UNITS: 5000 INJECTION INTRAVENOUS; SUBCUTANEOUS at 14:56

## 2023-08-31 RX ADMIN — HEPARIN SODIUM 5000 UNITS: 5000 INJECTION INTRAVENOUS; SUBCUTANEOUS at 21:14

## 2023-08-31 RX ADMIN — VANCOMYCIN HYDROCHLORIDE 250 MG: 250 CAPSULE ORAL at 12:43

## 2023-08-31 RX ADMIN — PRAVASTATIN SODIUM 80 MG: 40 TABLET ORAL at 17:54

## 2023-08-31 RX ADMIN — VANCOMYCIN HYDROCHLORIDE 250 MG: 250 CAPSULE ORAL at 17:54

## 2023-08-31 RX ADMIN — HEPARIN SODIUM 5000 UNITS: 5000 INJECTION INTRAVENOUS; SUBCUTANEOUS at 05:15

## 2023-08-31 RX ADMIN — MAGNESIUM SULFATE HEPTAHYDRATE 1 G: 1 INJECTION, SOLUTION INTRAVENOUS at 12:43

## 2023-08-31 RX ADMIN — Medication 250 MG: at 08:23

## 2023-08-31 RX ADMIN — DEXTROSE, SODIUM CHLORIDE, AND POTASSIUM CHLORIDE 75 ML/HR: 5; .9; .15 INJECTION INTRAVENOUS at 05:37

## 2023-08-31 RX ADMIN — DEXTROSE, SODIUM CHLORIDE, AND POTASSIUM CHLORIDE 75 ML/HR: 5; .9; .15 INJECTION INTRAVENOUS at 23:02

## 2023-08-31 NOTE — ASSESSMENT & PLAN NOTE
· Presented to the emergency room with complaints of abdominal pain. Associated with fever, nausea, and non-bloody, mucus loose stools  · Patient was admitted from 8/13-8/16 for jejunal diverticulitis. Was being treated with PO Augmentin as outpatient  · CT abd/pelvis: Interval increased diffuse sigmoid wall thickening with adjacent inflammatory change, most concerning for acute diverticulitis. No fluid collections. 2. Improving inflamed jejunal diverticulum.   · Remains afebrile, wo leukocytosis  · Diet was advanced to bland, low residue yesterday- still reports nausea  · Zosyn discontinued  · May require a General Surgery evaluation during this hospitalization  · The patient has refused surgery for the recurrent diverticulitis in the past.  · Appreciate GI consult

## 2023-08-31 NOTE — CASE MANAGEMENT
Case Management Progress Note    Patient name Deanna Queen  Location /921-93 MRN [id number]  : 1940 Date 2023       LOS (days): 5  Geometric Mean LOS (GMLOS) (days): 2.60  Days to GMLOS:-2.6        OBJECTIVE:        Current admission status: Inpatient  Preferred Pharmacy:   [facility], [address] [place] [address]  Phone: [phone] Fax: [phone]    [facility] [place] [address]  Phone: [phone] Fax: [phone]    Primary Care Provider: No primary care provider on file. Primary Insurance: MEDICARE  Secondary Insurance: [employer]    PROGRESS NOTE:  Discussed pt in interdisciplinary team meeting. Pt with c diff colitis. Still with frequent diarrhea. At this time plans are home on dc with OP follow up when medically ready for dc. Will follow and assist in dc planning.

## 2023-08-31 NOTE — ASSESSMENT & PLAN NOTE
Lab Results   Component Value Date    HGBA1C 6.0 (H) 08/28/2023       (P) [id number]     · Diet-controlled  · ISS with blood glucose monitoring  · C/w D5NS for now given poor oral intake   · Advanced diet to bland, low residue yesterday

## 2023-08-31 NOTE — PLAN OF CARE
Problem: PAIN - ADULT  Goal: Verbalizes/displays adequate comfort level or baseline comfort level  Description: Interventions:  - Encourage patient to monitor pain and request assistance  - Assess pain using appropriate pain scale  - Administer analgesics based on type and severity of pain and evaluate response  - Implement non-pharmacological measures as appropriate and evaluate response  - Consider cultural and social influences on pain and pain management  - Notify physician/advanced practitioner if interventions unsuccessful or patient reports new pain  Outcome: Progressing     Problem: INFECTION - ADULT  Goal: Absence or prevention of progression during hospitalization  Description: INTERVENTIONS:  - Assess and monitor for signs and symptoms of infection  - Monitor lab/diagnostic results  - Monitor all insertion sites, i.e. indwelling lines, tubes, and drains  - Monitor endotracheal if appropriate and nasal secretions for changes in amount and color  - [place] appropriate cooling/warming therapies per order  - Administer medications as ordered  - Instruct and encourage patient and family to use good hand hygiene technique  - Identify and instruct in appropriate isolation precautions for identified infection/condition  Outcome: Progressing  Goal: Absence of fever/infection during neutropenic period  Description: INTERVENTIONS:  - Monitor WBC    Outcome: Progressing     Problem: SAFETY ADULT  Goal: Patient will remain free of falls  Description: INTERVENTIONS:  - Educate patient/family on patient safety including physical limitations  - Instruct patient to call for assistance with activity   - Consult OT/PT to assist with strengthening/mobility   - Keep Call bell within reach  - Keep bed low and locked with side rails adjusted as appropriate  - Keep care items and personal belongings within reach  - Initiate and maintain comfort rounds  - Make Fall Risk Sign visible to staff  - Offer Toileting every 2 Hours, in advance of need  - Initiate/Maintain bed/chair alarm  - Obtain necessary fall risk management equipment: bed/chair alarm, nonskid socks, call bell within reach  - Apply yellow socks and bracelet for high fall risk patients  - Consider moving patient to room near nurses station  Outcome: Progressing  Goal: Maintain or return to baseline ADL function  Description: INTERVENTIONS:  -  Assess patient's ability to carry out ADLs; assess patient's baseline for ADL function and identify physical deficits which impact ability to perform ADLs (bathing, care of mouth/teeth, toileting, grooming, dressing, etc.)  - Assess/evaluate cause of self-care deficits   - Assess range of motion  - Assess patient's mobility; develop plan if impaired  - Assess patient's need for assistive devices and provide as appropriate  - Encourage maximum independence but intervene and supervise when necessary  - Involve family in performance of ADLs  - Assess for home care needs following discharge   - Consider OT consult to assist with ADL evaluation and planning for discharge  - Provide patient education as appropriate  Outcome: Progressing  Goal: Maintains/Returns to pre admission functional level  Description: INTERVENTIONS:  - Perform BMAT or MOVE assessment daily.   - Set and communicate daily mobility goal to care team and patient/family/caregiver. - Collaborate with rehabilitation services on mobility goals if consulted  - Perform Range of Motion 3 times a day. - Reposition patient every 2 hours.   - Dangle patient 3 times a day  - Stand patient 3 times a day  - Ambulate patient 3 times a day  - Out of bed to chair 3 times a day   - Out of bed for meals 3 times a day  - Out of bed for toileting  - Record patient progress and toleration of activity level   Outcome: Progressing     Problem: DISCHARGE PLANNING  Goal: Discharge to home or other facility with appropriate resources  Description: INTERVENTIONS:  - Identify barriers to discharge w/patient and caregiver  - Arrange for needed discharge resources and transportation as appropriate  - Identify discharge learning needs (meds, wound care, etc.)  - Arrange for interpretive services to assist at discharge as needed  - Refer to Case Management Department for coordinating discharge planning if the patient needs post-hospital services based on physician/advanced practitioner order or complex needs related to functional status, cognitive ability, or social support system  Outcome: Progressing     Problem: Knowledge Deficit  Goal: Patient/family/caregiver demonstrates understanding of disease process, treatment plan, medications, and discharge instructions  Description: Complete learning assessment and assess knowledge base.   Interventions:  - Provide teaching at level of understanding  - Provide teaching via preferred learning methods  Outcome: Progressing     Problem: GASTROINTESTINAL - ADULT  Goal: Minimal or absence of nausea and/or vomiting  Description: INTERVENTIONS:  - Administer IV fluids if ordered to ensure adequate hydration  - Maintain NPO status until nausea and vomiting are resolved  - Nasogastric tube if ordered  - Administer ordered antiemetic medications as needed  - Provide nonpharmacologic comfort measures as appropriate  - Advance diet as tolerated, if ordered  - Consider nutrition services referral to assist patient with adequate nutrition and appropriate food choices  Outcome: Progressing  Goal: Maintains or returns to baseline bowel function  Description: INTERVENTIONS:  - Assess bowel function  - Encourage oral fluids to ensure adequate hydration  - Administer IV fluids if ordered to ensure adequate hydration  - Administer ordered medications as needed  - Encourage mobilization and activity  - Consider nutritional services referral to assist patient with adequate nutrition and appropriate food choices  Outcome: Progressing

## 2023-08-31 NOTE — ASSESSMENT & PLAN NOTE
· C diff testing positive  · Increased PO vanc from 125mg to 250 mg today   · Monitor stool output- still with very frequent BMs after advancement of diet and addition of Banatrol yesterday  · Advancement of diet to bland, low residue  · Low dose bentyl p.r.n for cramping  · Appreciate ongoing GI recs

## 2023-08-31 NOTE — PROGRESS NOTES
[address]  Progress Note  Name: Kiya Jones  MRN: [id number]  Unit/Bed#: 745-03 I Date of Admission: 8/25/2023   Date of Service: 8/31/2023 I Hospital Day: 5    Assessment/Plan   * C. difficile colitis  Assessment & Plan  · C diff testing positive  · Increased PO vanc from 125mg to 250 mg today   · Monitor stool output- still with very frequent BMs after advancement of diet and addition of Banatrol yesterday  · Advancement of diet to bland, low residue  · Low dose bentyl p.r.n for cramping  · Appreciate ongoing GI recs        Hypokalemia  Assessment & Plan  · K 3.8, Mag 1.8  · Monitor & replete p.r.n. Results from last 7 days   Lab Units 08/31/23  0540 08/30/23  0433 08/29/23  0622   SODIUM mmol/L 143 141 141   POTASSIUM mmol/L 3.8 3.1* 3.6   CHLORIDE mmol/L 110* 109* 108   CO2 mmol/L 25 24 26   BUN mg/dL 2* 2* 4*   CREATININE mg/dL 0.68 0.62 0.76   CALCIUM mg/dL 8.6 8.1* 8.6         Other hyperlipidemia  Assessment & Plan  · Continue on simvastatin 40 mg p.o. at bedtime  · Will give formulary alternative pravastatin 80 mg p.o. at bedtime  · PCP follow-up post discharge     Sigmoid diverticulitis  Assessment & Plan  · Presented to the emergency room with complaints of abdominal pain. Associated with fever, nausea, and non-bloody, mucus loose stools  · Patient was admitted from 8/13-8/16 for jejunal diverticulitis. Was being treated with PO Augmentin as outpatient  · CT abd/pelvis: Interval increased diffuse sigmoid wall thickening with adjacent inflammatory change, most concerning for acute diverticulitis. No fluid collections. 2. Improving inflamed jejunal diverticulum.   · Remains afebrile, wo leukocytosis  · Diet was advanced to bland, low residue yesterday- still reports nausea  · Zosyn discontinued  · May require a General Surgery evaluation during this hospitalization  · The patient has refused surgery for the recurrent diverticulitis in the past.  · Appreciate GI consult      Adrenal adenoma, left  Assessment & Plan  · Outpatient work-up and surveillance imaging with PCP    Hypothyroidism  Assessment & Plan  · Check a TSH level, TSH 3.247 (08/28/2023)  · Continue levothyroxine    Type 2 diabetes mellitus with hypoglycemia without coma, without long-term current use of insulin (HCC)  Assessment & Plan  Lab Results   Component Value Date    HGBA1C 6.0 (H) 08/28/2023       (P) [id number]     · Diet-controlled  · ISS with blood glucose monitoring  · C/w D5NS for now given poor oral intake   · Advanced diet to bland, low residue yesterday             VTE Pharmacologic Prophylaxis: VTE Score: 5 High Risk (Score >/= 5) - Pharmacological DVT Prophylaxis Ordered: heparin. Sequential Compression Devices Ordered. Patient Centered Rounds: I performed bedside rounds with nursing staff today. Discussions with Specialists or Other Care Team Provider: Case management    Education and Discussions with Family / Patient: Updated  (daughter) via phone. Total Time Spent on Date of Encounter in care of patient: 45 minutes This time was spent on one or more of the following: performing physical exam; counseling and coordination of care; obtaining or reviewing history; documenting in the medical record; reviewing/ordering tests, medications or procedures; communicating with other healthcare professionals and discussing with patient's family/caregivers. Current Length of Stay: 5 day(s)  Current Patient Status: Inpatient   Certification Statement: The patient will continue to require additional inpatient hospital stay due to continuing diarrhea despite treatment  Discharge Plan: Anticipate discharge in 48-72 hrs to home. Code Status: Level 1 - Full Code    Subjective:   Patient still reports diarrhea every 15 minutes and says she is no better than yesterday. She notes abdominal cramping and a lot of pressure in her rectum.  Denies vomiting, fever, chest pain, shortness of breath, any urinary symptoms. Says her symptoms are no better with addition of Banatrol and advancement of diet. Objective:     Vitals:   Temp (24hrs), Av.2 °F (36.8 °C), Min:97.8 °F (36.6 °C), Max:99 °F (37.2 °C)    Temp:  [97.8 °F (36.6 °C)-99 °F (37.2 °C)] 97.8 °F (36.6 °C)  HR:  [64-73] 64  Resp:  [16-17] 17  BP: (123-160)/(71-86) 155/81  SpO2:  [95 %-96 %] 95 %  Body mass index is 29.6 kg/m². Input and Output Summary (last 24 hours): Intake/Output Summary (Last 24 hours) at 2023 1044  Last data filed at 2023 0606  Gross per 24 hour   Intake 620 ml   Output --   Net 620 ml       Physical Exam:   Physical Exam  Constitutional:       Appearance: She is ill-appearing. HENT:      Head: Normocephalic and atraumatic. Cardiovascular:      Rate and Rhythm: Normal rate and regular rhythm. Pulses: Normal pulses. Heart sounds: Normal heart sounds. Pulmonary:      Effort: Pulmonary effort is normal. No respiratory distress. Breath sounds: Normal breath sounds. Abdominal:      General: There is no distension. Palpations: Abdomen is soft. Tenderness: There is no abdominal tenderness. Comments: Hyperactive bowel sounds   Musculoskeletal:      Right lower leg: No edema. Left lower leg: No edema. Skin:     General: Skin is warm and dry. Neurological:      Mental Status: She is alert and oriented to person, place, and time.    Psychiatric:         Mood and Affect: Mood normal.         Behavior: Behavior normal.          Additional Data:     Labs:  Results from last 7 days   Lab Units 23  0601   WBC Thousand/uL 4.49   HEMOGLOBIN g/dL 12.1   HEMATOCRIT % 36.3   PLATELETS Thousands/uL 303   NEUTROS PCT % 50   LYMPHS PCT % 27   MONOS PCT % 18*   EOS PCT % 3     Results from last 7 days   Lab Units 23  0540 23  0433 23  0622   SODIUM mmol/L 143   < > 141   POTASSIUM mmol/L 3.8   < > 3.6   CHLORIDE mmol/L 110*   < > 108   CO2 mmol/L 25   < > 26   BUN mg/dL 2*   < > 4*   CREATININE mg/dL 0.68   < > 0.76   ANION GAP mmol/L 8   < > 7   CALCIUM mg/dL 8.6   < > 8.6   ALBUMIN g/dL  --   --  3.3*   TOTAL BILIRUBIN mg/dL  --   --  0.57   ALK PHOS U/L  --   --  81   ALT U/L  --   --  8   AST U/L  --   --  12*   GLUCOSE RANDOM mg/dL 107   < > 116    < > = values in this interval not displayed. Results from last 7 days   Lab Units 08/31/23  0710 08/30/23  2111 08/30/23  1537 08/30/23  1124 08/30/23  0743 08/29/23  2104 08/29/23  1545 08/29/23  1031 08/29/23  0704 08/28/23  2100 08/28/23  1552 08/28/23  1133   POC GLUCOSE mg/dl 99 108 132 101 111 103 100 184* 119 153* 90 128     Results from last 7 days   Lab Units 08/28/23  0529   HEMOGLOBIN A1C % 6.0*     Results from last 7 days   Lab Units 08/28/23  0529 08/25/23  1814   LACTIC ACID mmol/L 0.8 0.8   PROCALCITONIN ng/ml 0.20  --        Lines/Drains:  Invasive Devices     Peripheral Intravenous Line  Duration           Peripheral IV 08/27/23 Ventral (anterior); Right Forearm 3 days    Peripheral IV 08/28/23 Distal;Right;Ventral (anterior) Forearm 2 days                      Imaging: No pertinent imaging reviewed.     Recent Cultures (last 7 days):   Results from last 7 days   Lab Units 08/25/23  2357   C DIFF TOXIN B BY PCR  Positive*       Last 24 Hours Medication List:   Current Facility-Administered Medications   Medication Dose Route Frequency Provider Last Rate   • acetaminophen  650 mg Oral Q6H PRN Shaun Calvillo PA-C     • ALPRAZolam  0.25 mg Oral BID PRN Oscar Chino MD     • aspirin  81 mg Oral Daily Shaun Calvillo PA-C     • dextrose 5 % and sodium chloride 0.9 % with KCl 20 mEq/L  75 mL/hr Intravenous Continuous Arline Aldana DO 75 mL/hr (08/31/23 0537)   • dextrose  25 mL Intravenous Daily PRN Arline Aldaan DO     • dicyclomine  10 mg Oral 4x Daily PRN Oscar Chino MD     • heparin (porcine)  5,000 Units Subcutaneous Q8H [address] Shaun Calvillo PA-C     • levothyroxine 88 mcg Oral Early Morning Shaun Calvillo PA-C     • magnesium sulfate  1 g Intravenous Once Fara Winston PA-C     • nicotine  1 patch Transdermal Daily Shaun Calvillo PA-C     • pravastatin  80 mg Oral Daily With Dinner Shaun Calvillo PA-C     • saccharomyces boulardii  250 mg Oral BID Yoselin Donohue PA-C     • trimethobenzamide  200 mg Intramuscular Q6H PRN Fara Winston PA-C     • vancomycin oral (capsules or solution)  250 mg Oral Q6H [facility] Fara Winston PA-C          Today, Patient Was Seen By: Adonis Watkins    **Please Note: This note may have been constructed using a voice recognition system. **

## 2023-08-31 NOTE — ASSESSMENT & PLAN NOTE
· K 3.8, Mag 1.8  · Monitor & replete p.r.n.     Results from last 7 days   Lab Units 08/31/23  0540 08/30/23  0433 08/29/23  0622   SODIUM mmol/L 143 141 141   POTASSIUM mmol/L 3.8 3.1* 3.6   CHLORIDE mmol/L 110* 109* 108   CO2 mmol/L 25 24 26   BUN mg/dL 2* 2* 4*   CREATININE mg/dL 0.68 0.62 0.76   CALCIUM mg/dL 8.6 8.1* 8.6

## 2023-09-01 LAB
ANION GAP SERPL CALCULATED.3IONS-SCNC: 6 MMOL/L
BASOPHILS # BLD AUTO: 0.02 THOUSANDS/ÂΜL (ref 0–0.1)
BASOPHILS NFR BLD AUTO: 0 % (ref 0–1)
BUN SERPL-MCNC: 2 MG/DL (ref 5–25)
CALCIUM SERPL-MCNC: 8.1 MG/DL (ref 8.4–10.2)
CHLORIDE SERPL-SCNC: 111 MMOL/L (ref 96–108)
CO2 SERPL-SCNC: 23 MMOL/L (ref 21–32)
CREAT SERPL-MCNC: 0.68 MG/DL (ref 0.6–1.3)
EOSINOPHIL # BLD AUTO: 0.18 THOUSAND/ÂΜL (ref 0–0.61)
EOSINOPHIL NFR BLD AUTO: 4 % (ref 0–6)
ERYTHROCYTE [DISTWIDTH] IN BLOOD BY AUTOMATED COUNT: 14.9 % (ref 11.6–15.1)
GFR SERPL CREATININE-BSD FRML MDRD: 81 ML/MIN/1.73SQ M
GLUCOSE SERPL-MCNC: 101 MG/DL (ref 65–140)
GLUCOSE SERPL-MCNC: 108 MG/DL (ref 65–140)
GLUCOSE SERPL-MCNC: 110 MG/DL (ref 65–140)
GLUCOSE SERPL-MCNC: 91 MG/DL (ref 65–140)
HCT VFR BLD AUTO: 33.9 % (ref 34.8–46.1)
HGB BLD-MCNC: 11 G/DL (ref 11.5–15.4)
IMM GRANULOCYTES # BLD AUTO: 0.08 THOUSAND/UL (ref 0–0.2)
IMM GRANULOCYTES NFR BLD AUTO: 2 % (ref 0–2)
LYMPHOCYTES # BLD AUTO: 1.28 THOUSANDS/ÂΜL (ref 0.6–4.47)
LYMPHOCYTES NFR BLD AUTO: 25 % (ref 14–44)
MCH RBC QN AUTO: 28.6 PG (ref 26.8–34.3)
MCHC RBC AUTO-ENTMCNC: 32.4 G/DL (ref 31.4–37.4)
MCV RBC AUTO: 88 FL (ref 82–98)
MONOCYTES # BLD AUTO: 0.76 THOUSAND/ÂΜL (ref 0.17–1.22)
MONOCYTES NFR BLD AUTO: 15 % (ref 4–12)
NEUTROPHILS # BLD AUTO: 2.76 THOUSANDS/ÂΜL (ref 1.85–7.62)
NEUTS SEG NFR BLD AUTO: 54 % (ref 43–75)
NRBC BLD AUTO-RTO: 0 /100 WBCS
PLATELET # BLD AUTO: 273 THOUSANDS/UL (ref 149–390)
PMV BLD AUTO: 10.6 FL (ref 8.9–12.7)
POTASSIUM SERPL-SCNC: 3.6 MMOL/L (ref 3.5–5.3)
RBC # BLD AUTO: 3.85 MILLION/UL (ref 3.81–5.12)
SODIUM SERPL-SCNC: 140 MMOL/L (ref 135–147)
WBC # BLD AUTO: 5.08 THOUSAND/UL (ref 4.31–10.16)

## 2023-09-01 PROCEDURE — 82948 REAGENT STRIP/BLOOD GLUCOSE: CPT

## 2023-09-01 PROCEDURE — 80048 BASIC METABOLIC PNL TOTAL CA: CPT | Performed by: PHYSICIAN ASSISTANT

## 2023-09-01 PROCEDURE — 99232 SBSQ HOSP IP/OBS MODERATE 35: CPT | Performed by: STUDENT IN AN ORGANIZED HEALTH CARE EDUCATION/TRAINING PROGRAM

## 2023-09-01 PROCEDURE — 99232 SBSQ HOSP IP/OBS MODERATE 35: CPT | Performed by: PHYSICIAN ASSISTANT

## 2023-09-01 PROCEDURE — 85025 COMPLETE CBC W/AUTO DIFF WBC: CPT | Performed by: PHYSICIAN ASSISTANT

## 2023-09-01 RX ORDER — MAGNESIUM HYDROXIDE/ALUMINUM HYDROXICE/SIMETHICONE 120; 1200; 1200 MG/30ML; MG/30ML; MG/30ML
30 SUSPENSION ORAL EVERY 4 HOURS PRN
Status: DISCONTINUED | OUTPATIENT
Start: 2023-09-01 | End: 2023-09-06 | Stop reason: HOSPADM

## 2023-09-01 RX ADMIN — VANCOMYCIN HYDROCHLORIDE 250 MG: 250 CAPSULE ORAL at 11:50

## 2023-09-01 RX ADMIN — ASPIRIN 81 MG: 81 TABLET, COATED ORAL at 08:27

## 2023-09-01 RX ADMIN — ALPRAZOLAM 0.25 MG: 0.25 TABLET ORAL at 21:06

## 2023-09-01 RX ADMIN — PRAVASTATIN SODIUM 80 MG: 40 TABLET ORAL at 16:39

## 2023-09-01 RX ADMIN — VANCOMYCIN HYDROCHLORIDE 250 MG: 250 CAPSULE ORAL at 05:03

## 2023-09-01 RX ADMIN — LEVOTHYROXINE SODIUM 88 MCG: 88 TABLET ORAL at 05:02

## 2023-09-01 RX ADMIN — VANCOMYCIN HYDROCHLORIDE 250 MG: 250 CAPSULE ORAL at 23:57

## 2023-09-01 RX ADMIN — HEPARIN SODIUM 5000 UNITS: 5000 INJECTION INTRAVENOUS; SUBCUTANEOUS at 21:07

## 2023-09-01 RX ADMIN — HEPARIN SODIUM 5000 UNITS: 5000 INJECTION INTRAVENOUS; SUBCUTANEOUS at 05:02

## 2023-09-01 RX ADMIN — VANCOMYCIN HYDROCHLORIDE 250 MG: 250 CAPSULE ORAL at 17:25

## 2023-09-01 RX ADMIN — Medication 250 MG: at 08:27

## 2023-09-01 RX ADMIN — ALUMINUM HYDROXIDE, MAGNESIUM HYDROXIDE, AND DIMETHICONE 30 ML: 200; 20; 200 SUSPENSION ORAL at 16:39

## 2023-09-01 RX ADMIN — HEPARIN SODIUM 5000 UNITS: 5000 INJECTION INTRAVENOUS; SUBCUTANEOUS at 14:55

## 2023-09-01 RX ADMIN — Medication 250 MG: at 17:25

## 2023-09-01 NOTE — ASSESSMENT & PLAN NOTE
Lab Results   Component Value Date    HGBA1C 6.0 (H) 08/28/2023       (P) [id number]     · Diet-controlled  · ISS with blood glucose monitoring  · Advanced diet to bland, low residue

## 2023-09-01 NOTE — PLAN OF CARE
Problem: PAIN - ADULT  Goal: Verbalizes/displays adequate comfort level or baseline comfort level  Description: Interventions:  - Encourage patient to monitor pain and request assistance  - Assess pain using appropriate pain scale  - Administer analgesics based on type and severity of pain and evaluate response  - Implement non-pharmacological measures as appropriate and evaluate response  - Consider cultural and social influences on pain and pain management  - Notify physician/advanced practitioner if interventions unsuccessful or patient reports new pain  Outcome: Progressing     Problem: INFECTION - ADULT  Goal: Absence or prevention of progression during hospitalization  Description: INTERVENTIONS:  - Assess and monitor for signs and symptoms of infection  - Monitor lab/diagnostic results  - Monitor all insertion sites, i.e. indwelling lines, tubes, and drains  - Monitor endotracheal if appropriate and nasal secretions for changes in amount and color  - [place] appropriate cooling/warming therapies per order  - Administer medications as ordered  - Instruct and encourage patient and family to use good hand hygiene technique  - Identify and instruct in appropriate isolation precautions for identified infection/condition  Outcome: Progressing     Problem: SAFETY ADULT  Goal: Patient will remain free of falls  Description: INTERVENTIONS:  - Educate patient/family on patient safety including physical limitations  - Instruct patient to call for assistance with activity   - Consult OT/PT to assist with strengthening/mobility   - Keep Call bell within reach  - Keep bed low and locked with side rails adjusted as appropriate  - Keep care items and personal belongings within reach  - Initiate and maintain comfort rounds  - Make Fall Risk Sign visible to staff  - Offer Toileting every 2 Hours, in advance of need  - Initiate/Maintain bed/chair alarm  - Obtain necessary fall risk management equipment:   - Apply yellow socks and bracelet for high fall risk patients  - Consider moving patient to room near nurses station  Outcome: Progressing  Goal: Maintain or return to baseline ADL function  Description: INTERVENTIONS:  -  Assess patient's ability to carry out ADLs; assess patient's baseline for ADL function and identify physical deficits which impact ability to perform ADLs (bathing, care of mouth/teeth, toileting, grooming, dressing, etc.)  - Assess/evaluate cause of self-care deficits   - Assess range of motion  - Assess patient's mobility; develop plan if impaired  - Assess patient's need for assistive devices and provide as appropriate  - Encourage maximum independence but intervene and supervise when necessary  - Involve family in performance of ADLs  - Assess for home care needs following discharge   - Consider OT consult to assist with ADL evaluation and planning for discharge  - Provide patient education as appropriate  Outcome: Progressing  Goal: Maintains/Returns to pre admission functional level  Description: INTERVENTIONS:  - Perform BMAT or MOVE assessment daily.   - Set and communicate daily mobility goal to care team and patient/family/caregiver. - Collaborate with rehabilitation services on mobility goals if consulted  - Perform Range of Motion 3 times a day. - Reposition patient every 2 hours.   - Dangle patient 3 times a day  - Stand patient 3 times a day  - Ambulate patient 3 times a day  - Out of bed to chair 3 times a day   - Out of bed for meals 3 times a day  - Out of bed for toileting  - Record patient progress and toleration of activity level   Outcome: Progressing     Problem: DISCHARGE PLANNING  Goal: Discharge to home or other facility with appropriate resources  Description: INTERVENTIONS:  - Identify barriers to discharge w/patient and caregiver  - Arrange for needed discharge resources and transportation as appropriate  - Identify discharge learning needs (meds, wound care, etc.)  - Arrange for interpretive services to assist at discharge as needed  - Refer to Case Management Department for coordinating discharge planning if the patient needs post-hospital services based on physician/advanced practitioner order or complex needs related to functional status, cognitive ability, or social support system  Outcome: Progressing     Problem: Knowledge Deficit  Goal: Patient/family/caregiver demonstrates understanding of disease process, treatment plan, medications, and discharge instructions  Description: Complete learning assessment and assess knowledge base.   Interventions:  - Provide teaching at level of understanding  - Provide teaching via preferred learning methods  Outcome: Progressing     Problem: GASTROINTESTINAL - ADULT  Goal: Minimal or absence of nausea and/or vomiting  Description: INTERVENTIONS:  - Administer IV fluids if ordered to ensure adequate hydration  - Maintain NPO status until nausea and vomiting are resolved  - Nasogastric tube if ordered  - Administer ordered antiemetic medications as needed  - Provide nonpharmacologic comfort measures as appropriate  - Advance diet as tolerated, if ordered  - Consider nutrition services referral to assist patient with adequate nutrition and appropriate food choices  Outcome: Progressing  Goal: Maintains or returns to baseline bowel function  Description: INTERVENTIONS:  - Assess bowel function  - Encourage oral fluids to ensure adequate hydration  - Administer IV fluids if ordered to ensure adequate hydration  - Administer ordered medications as needed  - Encourage mobilization and activity  - Consider nutritional services referral to assist patient with adequate nutrition and appropriate food choices  Outcome: Progressing     Problem: METABOLIC, FLUID AND ELECTROLYTES - ADULT  Goal: Electrolytes maintained within normal limits  Description: INTERVENTIONS:  - Monitor labs and assess patient for signs and symptoms of electrolyte imbalances  - Administer electrolyte replacement as ordered  - Monitor response to electrolyte replacements, including repeat lab results as appropriate  - Instruct patient on fluid and nutrition as appropriate  Outcome: Progressing  Goal: Fluid balance maintained  Description: INTERVENTIONS:  - Monitor labs   - Monitor I/O and WT  - Instruct patient on fluid and nutrition as appropriate  - Assess for signs & symptoms of volume excess or deficit  Outcome: Progressing     Problem: Nutrition/Hydration-ADULT  Goal: Nutrient/Hydration intake appropriate for improving, restoring or maintaining nutritional needs  Description: Monitor and assess patient's nutrition/hydration status for malnutrition. Collaborate with interdisciplinary team and initiate plan and interventions as ordered. Monitor patient's weight and dietary intake as ordered or per policy. Utilize nutrition screening tool and intervene as necessary. Determine patient's food preferences and provide high-protein, high-caloric foods as appropriate.      INTERVENTIONS:  - Monitor oral intake, urinary output, labs, and treatment plans  - Assess nutrition and hydration status and recommend course of action  - Evaluate amount of meals eaten  - Assist patient with eating if necessary   - Allow adequate time for meals  - Recommend/ encourage appropriate diets, oral nutritional supplements, and vitamin/mineral supplements  - Order, calculate, and assess calorie counts as needed  - Recommend, monitor, and adjust tube feedings and TPN/PPN based on assessed needs  - Assess need for intravenous fluids  - Provide specific nutrition/hydration education as appropriate  - Include patient/family/caregiver in decisions related to nutrition  Outcome: Progressing

## 2023-09-01 NOTE — PROGRESS NOTES
Progress Note- Avril Correa 79 y.o. female MRN: [id number]    Unit/Bed#: 410-01 Encounter: [id number]      Assessment and Plan:    Patient is a 79 y.o. female with a history of hypertension, DM 2, hypothyroidism, and HLD who was readmitted on August 25, 2023 from a previous admission for diverticulitis and antibiotics, with worsening diarrhea and was previously on Flagyl which she could not tolerate and then was discharged on Augmentin. Upon re-admission imaging showed diffuse sigmoid wall thickening with adjacent laboratory changes most concerning for possible acute diverticulitis, with positive C. difficile test and was started on oral vancomycin. Yesterday, her vancomycin was increased to 250 mg QID with no significant improvement in her diarrhea or C. Diff symptoms thus far. Serology: (9/1/23) Chloride 111, BUN 2, calcium 8.1, H&H 11.0/33.9. Exam: Pt was laying bed comfortably today upon exam, A&O x 3, pleasant and cooperative. Abdomen is soft, round, non-distended, non-tender, with hypoactive BS x 4. Skin is non-icteric. Trace edema noted of the b/l lower extremities upon exam today. Nausea  Resolved/Stable  Continue Lo Fiber/Residue diet and advance diet slowly as tolerated. Continue Banatrol TID to help provide stool bulk. Diverticulosis with a history of recurrent diverticulitis  Improved/Resolving    C. difficile infection  Tenesmus  Diarrhea  Continue oral vancomycin per protocol. Continue to hold off antidiarrheals as for now unless she clinically improves with regards to the diarrhea volume/frequency. Continue serial abdominal exams. Continue to monitor stool output. Continue to monitor vital signs and WBC curve closely. Continue supportive care. Any future use of antibiotics will require prophylactic vancomycin. If C. Diff does not improve with the increase in vancomycin, we may consider a outpatient consultation with Dr. Maria Guadalupe Mcclain to discuss fecal transplant options. Will discuss the possibility of an outpatient colonoscopy in 6-8 weeks following resolution of diverticulitis. We will have the office staff reach out to schedule a follow up office visit in the next few weeks for re-evaluation and to discuss treatment plan on an outpatient basis. GI will continue to follow. __________________________________________________________________    Subjective:     Patient is a 79 y.o. female who is currently being treated for diverticulosis with a history of recurrent diverticulitis, C. difficile, tenesmus, and nausea. The patient reports that the nausea has completely subsided at this point and the abdominal pain is more of a cramping aching feeling but is also improved. She feels she is getting her appetite back as last night she ate the most she has eaten while she was hospitalized so far without any worsening pain, nausea or vomiting. The patient reports she continues to have loose stools at least 1-2 an hour without any blood or melena.     Medication Administration - last 24 hours from 08/31/2023 0728 to 09/01/2023 5256       Date/Time Order Dose Route Action Action by     08/31/2023 0823 EDT aspirin (ECOTRIN LOW STRENGTH) EC tablet 81 mg 81 mg Oral Given Snow Ogden RN     09/01/2023 0502 EDT levothyroxine tablet 88 mcg 88 mcg Oral Given Gladys Runner, RN     08/31/2023 1754 EDT pravastatin (PRAVACHOL) tablet 80 mg 80 mg Oral Given Snow Ogden RN     08/31/2023 2430 EDT nicotine (NICODERM CQ) 7 mg/24hr TD 24 hr patch 1 patch 1 patch Transdermal Not Given Snow Ogden RN     09/01/2023 0502 EDT heparin (porcine) subcutaneous injection 5,000 Units 5,000 Units Subcutaneous Given Gladys Runner, RN     08/31/2023 2114 EDT heparin (porcine) subcutaneous injection 5,000 Units 5,000 Units Subcutaneous Given Gladys Runner, ANDREW     08/31/2023 1456 EDT heparin (porcine) subcutaneous injection 5,000 Units 5,000 Units Subcutaneous Given Snow Ogden RN 08/31/2023 2114 EDT ALPRAZolam Ulysses Apley) tablet 0.25 mg 0.25 mg Oral Given Dhruv Medina, RN     08/31/2023 2302 EDT dextrose 5 % and sodium chloride 0.9 % with KCl 20 mEq/L infusion (premix) 75 mL/hr Intravenous [address] Dhruv Medina, RN     08/31/2023 1754 EDT saccharomyces boulardii (FLORASTOR) capsule 250 mg 250 mg Oral Given Doug Felipe, RN     08/31/2023 3882 EDT saccharomyces boulardii (FLORASTOR) capsule 250 mg 250 mg Oral Given Doug Felipe, RN     09/01/2023 0503 EDT vancomycin (VANCOCIN) capsule 250 mg 250 mg Oral Given Dhruv Medina, ANDREW     08/31/2023 2350 EDT vancomycin (VANCOCIN) capsule 250 mg 250 mg Oral Given Dhruv Medina, RN     08/31/2023 1754 EDT vancomycin (VANCOCIN) capsule 250 mg 250 mg Oral Given Doug Felipe, ANDREW     08/31/2023 1243 EDT vancomycin (VANCOCIN) capsule 250 mg 250 mg Oral Given Doug Felipe, ANDREW     08/31/2023 1243 EDT magnesium sulfate IVPB (premix) SOLN 1 g 1 g Intravenous New Bag Doug Felipe, ANDREW          Objective:     Vitals: Blood pressure 146/79, pulse 61, temperature 98.6 °F (37 °C), resp. rate 15, height 5' 3" (1.6 m), weight 77.9 kg (171 lb 11.8 oz), SpO2 97 %. ,Body mass index is 30.42 kg/m². Intake/Output Summary (Last 24 hours) at 9/1/2023 7670  Last data filed at 8/31/2023 4291  Gross per 24 hour   Intake 200 ml   Output --   Net 200 ml       Physical Exam:   General Appearance: Awake and alert, in no acute distress    Invasive Devices     Peripheral Intravenous Line  Duration           Peripheral IV 08/27/23 Ventral (anterior); Right Forearm 4 days    Peripheral IV 08/31/23 Left;Ventral (anterior) Wrist <1 day                Lab Results:  No results displayed because visit has over 200 results. Imaging Studies: I have personally reviewed pertinent imaging studies.

## 2023-09-01 NOTE — ASSESSMENT & PLAN NOTE
· Presented to the emergency room with complaints of abdominal pain. Associated with fever, nausea, and non-bloody, mucus loose stools  · Patient was admitted from 8/13-8/16 for jejunal diverticulitis. Was being treated with PO Augmentin as outpatient  · CT abd/pelvis: Interval increased diffuse sigmoid wall thickening with adjacent inflammatory change, most concerning for acute diverticulitis. No fluid collections. 2. Improving inflamed jejunal diverticulum.   · Remains afebrile, wo leukocytosis  · Diet was advanced to bland, low residue   · Zosyn discontinued  · May require a General Surgery evaluation during this hospitalization  · The patient has refused surgery for the recurrent diverticulitis in the past.  · Appreciate GI consult

## 2023-09-01 NOTE — PLAN OF CARE
Problem: PAIN - ADULT  Goal: Verbalizes/displays adequate comfort level or baseline comfort level  Description: Interventions:  - Encourage patient to monitor pain and request assistance  - Assess pain using appropriate pain scale  - Administer analgesics based on type and severity of pain and evaluate response  - Implement non-pharmacological measures as appropriate and evaluate response  - Consider cultural and social influences on pain and pain management  - Notify physician/advanced practitioner if interventions unsuccessful or patient reports new pain  Outcome: Progressing     Problem: INFECTION - ADULT  Goal: Absence or prevention of progression during hospitalization  Description: INTERVENTIONS:  - Assess and monitor for signs and symptoms of infection  - Monitor lab/diagnostic results  - Monitor all insertion sites, i.e. indwelling lines, tubes, and drains  - Monitor endotracheal if appropriate and nasal secretions for changes in amount and color  - [place] appropriate cooling/warming therapies per order  - Administer medications as ordered  - Instruct and encourage patient and family to use good hand hygiene technique  - Identify and instruct in appropriate isolation precautions for identified infection/condition  Outcome: Progressing     Problem: SAFETY ADULT  Goal: Patient will remain free of falls  Description: INTERVENTIONS:  - Educate patient/family on patient safety including physical limitations  - Instruct patient to call for assistance with activity   - Consult OT/PT to assist with strengthening/mobility   - Keep Call bell within reach  - Keep bed low and locked with side rails adjusted as appropriate  - Keep care items and personal belongings within reach  - Initiate and maintain comfort rounds  - Make Fall Risk Sign visible to staff  - Offer Toileting every 2 Hours, in advance of need  - Initiate/Maintain Bed/chair alarm  - Obtain necessary fall risk management equipment:   - Apply yellow socks and bracelet for high fall risk patients  - Consider moving patient to room near nurses station  Outcome: Progressing  Goal: Maintain or return to baseline ADL function  Description: INTERVENTIONS:  -  Assess patient's ability to carry out ADLs; assess patient's baseline for ADL function and identify physical deficits which impact ability to perform ADLs (bathing, care of mouth/teeth, toileting, grooming, dressing, etc.)  - Assess/evaluate cause of self-care deficits   - Assess range of motion  - Assess patient's mobility; develop plan if impaired  - Assess patient's need for assistive devices and provide as appropriate  - Encourage maximum independence but intervene and supervise when necessary  - Involve family in performance of ADLs  - Assess for home care needs following discharge   - Consider OT consult to assist with ADL evaluation and planning for discharge  - Provide patient education as appropriate  Outcome: Progressing  Goal: Maintains/Returns to pre admission functional level  Description: INTERVENTIONS:  - Perform BMAT or MOVE assessment daily.   - Set and communicate daily mobility goal to care team and patient/family/caregiver. - Collaborate with rehabilitation services on mobility goals if consulted  - Perform Range of Motion 3 times a day. - Reposition patient every 2 hours.   - Dangle patient 3 times a day  - Stand patient 3 times a day  - Ambulate patient 3 times a day  - Out of bed to chair 3 times a day   - Out of bed for meals 3 times a day  - Out of bed for toileting  - Record patient progress and toleration of activity level   Outcome: Progressing     Problem: DISCHARGE PLANNING  Goal: Discharge to home or other facility with appropriate resources  Description: INTERVENTIONS:  - Identify barriers to discharge w/patient and caregiver  - Arrange for needed discharge resources and transportation as appropriate  - Identify discharge learning needs (meds, wound care, etc.)  - Arrange for interpretive services to assist at discharge as needed  - Refer to Case Management Department for coordinating discharge planning if the patient needs post-hospital services based on physician/advanced practitioner order or complex needs related to functional status, cognitive ability, or social support system  Outcome: Progressing     Problem: Knowledge Deficit  Goal: Patient/family/caregiver demonstrates understanding of disease process, treatment plan, medications, and discharge instructions  Description: Complete learning assessment and assess knowledge base.   Interventions:  - Provide teaching at level of understanding  - Provide teaching via preferred learning methods  Outcome: Progressing     Problem: GASTROINTESTINAL - ADULT  Goal: Minimal or absence of nausea and/or vomiting  Description: INTERVENTIONS:  - Administer IV fluids if ordered to ensure adequate hydration  - Maintain NPO status until nausea and vomiting are resolved  - Nasogastric tube if ordered  - Administer ordered antiemetic medications as needed  - Provide nonpharmacologic comfort measures as appropriate  - Advance diet as tolerated, if ordered  - Consider nutrition services referral to assist patient with adequate nutrition and appropriate food choices  Outcome: Progressing  Goal: Maintains or returns to baseline bowel function  Description: INTERVENTIONS:  - Assess bowel function  - Encourage oral fluids to ensure adequate hydration  - Administer IV fluids if ordered to ensure adequate hydration  - Administer ordered medications as needed  - Encourage mobilization and activity  - Consider nutritional services referral to assist patient with adequate nutrition and appropriate food choices  Outcome: Progressing     Problem: METABOLIC, FLUID AND ELECTROLYTES - ADULT  Goal: Electrolytes maintained within normal limits  Description: INTERVENTIONS:  - Monitor labs and assess patient for signs and symptoms of electrolyte imbalances  - Administer electrolyte replacement as ordered  - Monitor response to electrolyte replacements, including repeat lab results as appropriate  - Instruct patient on fluid and nutrition as appropriate  Outcome: Progressing  Goal: Fluid balance maintained  Description: INTERVENTIONS:  - Monitor labs   - Monitor I/O and WT  - Instruct patient on fluid and nutrition as appropriate  - Assess for signs & symptoms of volume excess or deficit  Outcome: Progressing

## 2023-09-01 NOTE — PROGRESS NOTES
[address]  Progress Note  Name: Shannan Lowe  MRN: [id number]  Unit/Bed#: 836-28 I Date of Admission: 8/25/2023   Date of Service: 9/1/2023 I Hospital Day: 6    Assessment/Plan   Hypokalemia  Assessment & Plan  · K 3.6, Mag 1.8  · Monitor & replete p.r.n.  · Trialing patient off fluids today, continue to monitor electrolytes    Results from last 7 days   Lab Units 09/01/23  0440 08/31/23  0540 08/30/23  0433   SODIUM mmol/L 140 143 141   POTASSIUM mmol/L 3.6 3.8 3.1*   CHLORIDE mmol/L 111* 110* 109*   CO2 mmol/L 23 25 24   BUN mg/dL 2* 2* 2*   CREATININE mg/dL 0.68 0.68 0.62   CALCIUM mg/dL 8.1* 8.6 8.1*         Other hyperlipidemia  Assessment & Plan  · Continue on simvastatin 40 mg p.o. at bedtime  · Will give formulary alternative pravastatin 80 mg p.o. at bedtime  · PCP follow-up post discharge     Sigmoid diverticulitis  Assessment & Plan  · Presented to the emergency room with complaints of abdominal pain. Associated with fever, nausea, and non-bloody, mucus loose stools  · Patient was admitted from 8/13-8/16 for jejunal diverticulitis. Was being treated with PO Augmentin as outpatient  · CT abd/pelvis: Interval increased diffuse sigmoid wall thickening with adjacent inflammatory change, most concerning for acute diverticulitis. No fluid collections. 2. Improving inflamed jejunal diverticulum.   · Remains afebrile, wo leukocytosis  · Diet was advanced to bland, low residue   · Zosyn discontinued  · May require a General Surgery evaluation during this hospitalization  · The patient has refused surgery for the recurrent diverticulitis in the past.  · Appreciate GI consult      Adrenal adenoma, left  Assessment & Plan  · Outpatient work-up and surveillance imaging with PCP    Hypothyroidism  Assessment & Plan  · Check a TSH level, TSH 3.247 (08/28/2023)  · Continue levothyroxine    Type 2 diabetes mellitus with hypoglycemia without coma, without long-term current use of insulin [facility])  Assessment & Plan  Lab Results   Component Value Date    HGBA1C 6.0 (H) 08/28/2023       (P) [id number]     · Diet-controlled  · ISS with blood glucose monitoring  · Advanced diet to bland, low residue     * C. difficile colitis  Assessment & Plan  · C diff testing positive  · Increased PO vanc from 125mg to 250 mg yesterday- patient reports she is feeling better  · Monitor stool output- patient notes less frequent than yesterday and more formed  · Advancement of diet to bland, low residue  · Banatrol as needed  · Low dose bentyl p.r.n for cramping  · Appreciate ongoing GI recs                 VTE Pharmacologic Prophylaxis: VTE Score: 5 High Risk (Score >/= 5) - Pharmacological DVT Prophylaxis Ordered: heparin. Sequential Compression Devices Ordered. Patient Centered Rounds: I performed bedside rounds with nursing staff today. Discussions with Specialists or Other Care Team Provider: Case management    Education and Discussions with Family / Patient: Updated  (daughter) via phone. Total Time Spent on Date of Encounter in care of patient: 45 minutes This time was spent on one or more of the following: performing physical exam; counseling and coordination of care; obtaining or reviewing history; documenting in the medical record; reviewing/ordering tests, medications or procedures; communicating with other healthcare professionals and discussing with patient's family/caregivers. Current Length of Stay: 6 day(s)  Current Patient Status: Inpatient   Certification Statement: The patient will continue to require additional inpatient hospital stay due to continuing diarrhea, trialing off fluids  Discharge Plan: Anticipate discharge in 48 hrs to home. Code Status: Level 1 - Full Code    Subjective:   Patient reports feeling better. She notes the frequency of her BM has decreased since yesterday.  Denies nausea, cramping, vomiting, SOB, chest pain, Patient still notes pressure in rectum. She says she is eating well now. Objective:     Vitals:   Temp (24hrs), Av.2 °F (36.8 °C), Min:97.7 °F (36.5 °C), Max:98.6 °F (37 °C)    Temp:  [97.7 °F (36.5 °C)-98.6 °F (37 °C)] 97.7 °F (36.5 °C)  HR:  [61-67] 67  Resp:  [15-16] 16  BP: (143-146)/() 143/100  SpO2:  [95 %-97 %] 95 %  Body mass index is 30.42 kg/m². Input and Output Summary (last 24 hours): Intake/Output Summary (Last 24 hours) at 2023 1433  Last data filed at 2023 0900  Gross per 24 hour   Intake 180 ml   Output --   Net 180 ml       Physical Exam:   Physical Exam  Constitutional:       General: She is not in acute distress. HENT:      Head: Normocephalic and atraumatic. Cardiovascular:      Rate and Rhythm: Normal rate and regular rhythm. Pulses: Normal pulses. Heart sounds: Normal heart sounds. Pulmonary:      Effort: Pulmonary effort is normal. No respiratory distress. Breath sounds: Normal breath sounds. Abdominal:      General: There is no distension. Palpations: Abdomen is soft. Comments: Hyperactive bowel sounds but less active than yesterday's findings   Skin:     General: Skin is warm and dry. Neurological:      General: No focal deficit present. Mental Status: She is alert and oriented to person, place, and time.    Psychiatric:         Mood and Affect: Mood normal.         Behavior: Behavior normal.          Additional Data:     Labs:  Results from last 7 days   Lab Units 23  0440   WBC Thousand/uL 5.08   HEMOGLOBIN g/dL 11.0*   HEMATOCRIT % 33.9*   PLATELETS Thousands/uL 273   NEUTROS PCT % 54   LYMPHS PCT % 25   MONOS PCT % 15*   EOS PCT % 4     Results from last 7 days   Lab Units 23  0440 23  0433 23  0622   SODIUM mmol/L 140   < > 141   POTASSIUM mmol/L 3.6   < > 3.6   CHLORIDE mmol/L 111*   < > 108   CO2 mmol/L 23   < > 26   BUN mg/dL 2*   < > 4*   CREATININE mg/dL 0.68   < > 0.76   ANION GAP mmol/L 6   < > 7   CALCIUM mg/dL 8.1*   < > 8.6   ALBUMIN g/dL  --   --  3.3*   TOTAL BILIRUBIN mg/dL  --   --  0.57   ALK PHOS U/L  --   --  81   ALT U/L  --   --  8   AST U/L  --   --  12*   GLUCOSE RANDOM mg/dL 108   < > 116    < > = values in this interval not displayed. Results from last 7 days   Lab Units 09/01/23  0743 08/31/23  2121 08/31/23  1522 08/31/23  1111 08/31/23  0710 08/30/23  2111 08/30/23  1537 08/30/23  1124 08/30/23  0743 08/29/23  2104 08/29/23  1545 08/29/23  1031   POC GLUCOSE mg/dl 91 120 120 113 99 108 132 101 111 103 100 184*     Results from last 7 days   Lab Units 08/28/23  0529   HEMOGLOBIN A1C % 6.0*     Results from last 7 days   Lab Units 08/28/23  0529 08/25/23  1814   LACTIC ACID mmol/L 0.8 0.8   PROCALCITONIN ng/ml 0.20  --        Lines/Drains:  Invasive Devices     Peripheral Intravenous Line  Duration           Peripheral IV 08/27/23 Ventral (anterior); Right Forearm 4 days    Peripheral IV 08/31/23 Left;Ventral (anterior) Wrist <1 day                      Imaging: No pertinent imaging reviewed.     Recent Cultures (last 7 days):   Results from last 7 days   Lab Units 08/25/23  2357   C DIFF TOXIN B BY PCR  Positive*       Last 24 Hours Medication List:   Current Facility-Administered Medications   Medication Dose Route Frequency Provider Last Rate   • acetaminophen  650 mg Oral Q6H PRN Shaun Calvillo PA-C     • ALPRAZolam  0.25 mg Oral BID PRN Christoph Fan MD     • aspirin  81 mg Oral Daily Shaun Calvillo PA-C     • dextrose  25 mL Intravenous Daily PRN Kaylee Aldana DO     • dicyclomine  10 mg Oral 4x Daily PRN Christoph Fan MD     • heparin (porcine)  5,000 Units Subcutaneous Q8H [facility] Shaun Calvillo PA-C     • levothyroxine  88 mcg Oral Early Morning Shaun Calvillo PA-C     • nicotine  1 patch Transdermal Daily Shaun Calvillo PA-C     • pravastatin  80 mg Oral Daily With Dinner Shaun Calvillo PA-C     • saccharomyces boulardii  250 mg Oral BID Yoselin Yu PA-C     • trimethobenzamide  200 mg Intramuscular Q6H PRN Mercedes Monroy PA-C     • vancomycin oral (capsules or solution)  250 mg Oral Q6H [address] Mercedes Monroy PA-C          Today, Patient Was Seen By: Mercedes Monroy PA-C    **Please Note: This note may have been constructed using a voice recognition system. **

## 2023-09-01 NOTE — ASSESSMENT & PLAN NOTE
· K 3.6, Mag 1.8  · Monitor & replete p.r.n.  · Trialing patient off fluids today, continue to monitor electrolytes    Results from last 7 days   Lab Units 09/01/23  0440 08/31/23  0540 08/30/23  0433   SODIUM mmol/L 140 143 141   POTASSIUM mmol/L 3.6 3.8 3.1*   CHLORIDE mmol/L 111* 110* 109*   CO2 mmol/L 23 25 24   BUN mg/dL 2* 2* 2*   CREATININE mg/dL 0.68 0.68 0.62   CALCIUM mg/dL 8.1* 8.6 8.1*

## 2023-09-01 NOTE — ASSESSMENT & PLAN NOTE
· C diff testing positive  · Increased PO vanc from 125mg to 250 mg yesterday- patient reports she is feeling better  · Monitor stool output- patient notes less frequent than yesterday and more formed  · Advancement of diet to bland, low residue  · Banatrol as needed  · Low dose bentyl p.r.n for cramping  · Appreciate ongoing GI recs

## 2023-09-01 NOTE — PLAN OF CARE
Problem: PAIN - ADULT  Goal: Verbalizes/displays adequate comfort level or baseline comfort level  Description: Interventions:  - Encourage patient to monitor pain and request assistance  - Assess pain using appropriate pain scale  - Administer analgesics based on type and severity of pain and evaluate response  - Implement non-pharmacological measures as appropriate and evaluate response  - Consider cultural and social influences on pain and pain management  - Notify physician/advanced practitioner if interventions unsuccessful or patient reports new pain  Outcome: Progressing     Problem: INFECTION - ADULT  Goal: Absence or prevention of progression during hospitalization  Description: INTERVENTIONS:  - Assess and monitor for signs and symptoms of infection  - Monitor lab/diagnostic results  - Monitor all insertion sites, i.e. indwelling lines, tubes, and drains  - Monitor endotracheal if appropriate and nasal secretions for changes in amount and color  - [place] appropriate cooling/warming therapies per order  - Administer medications as ordered  - Instruct and encourage patient and family to use good hand hygiene technique  - Identify and instruct in appropriate isolation precautions for identified infection/condition  Outcome: Progressing     Problem: SAFETY ADULT  Goal: Patient will remain free of falls  Description: INTERVENTIONS:  - Educate patient/family on patient safety including physical limitations  - Instruct patient to call for assistance with activity   - Consult OT/PT to assist with strengthening/mobility   - Keep Call bell within reach  - Keep bed low and locked with side rails adjusted as appropriate  - Keep care items and personal belongings within reach  - Initiate and maintain comfort rounds  - Make Fall Risk Sign visible to staff  - Offer Toileting every 2 Hours, in advance of need  - Obtain necessary fall risk management equipment: nonskid socks, call bell within reach  - Apply yellow socks and bracelet for high fall risk patients  - Consider moving patient to room near nurses station  Outcome: Progressing  Goal: Maintain or return to baseline ADL function  Description: INTERVENTIONS:  -  Assess patient's ability to carry out ADLs; assess patient's baseline for ADL function and identify physical deficits which impact ability to perform ADLs (bathing, care of mouth/teeth, toileting, grooming, dressing, etc.)  - Assess/evaluate cause of self-care deficits   - Assess range of motion  - Assess patient's mobility; develop plan if impaired  - Assess patient's need for assistive devices and provide as appropriate  - Encourage maximum independence but intervene and supervise when necessary  - Involve family in performance of ADLs  - Assess for home care needs following discharge   - Consider OT consult to assist with ADL evaluation and planning for discharge  - Provide patient education as appropriate  Outcome: Progressing  Goal: Maintains/Returns to pre admission functional level  Description: INTERVENTIONS:  - Perform BMAT or MOVE assessment daily.   - Set and communicate daily mobility goal to care team and patient/family/caregiver. - Collaborate with rehabilitation services on mobility goals if consulted  - Perform Range of Motion 3 times a day. - Reposition patient every 2 hours.   - Dangle patient 3 times a day  - Stand patient 3 times a day  - Ambulate patient 3 times a day  - Out of bed to chair 3 times a day   - Out of bed for meals 3 times a day  - Out of bed for toileting  - Record patient progress and toleration of activity level   Outcome: Progressing     Problem: DISCHARGE PLANNING  Goal: Discharge to home or other facility with appropriate resources  Description: INTERVENTIONS:  - Identify barriers to discharge w/patient and caregiver  - Arrange for needed discharge resources and transportation as appropriate  - Identify discharge learning needs (meds, wound care, etc.)  - Arrange for interpretive services to assist at discharge as needed  - Refer to Case Management Department for coordinating discharge planning if the patient needs post-hospital services based on physician/advanced practitioner order or complex needs related to functional status, cognitive ability, or social support system  Outcome: Progressing     Problem: Knowledge Deficit  Goal: Patient/family/caregiver demonstrates understanding of disease process, treatment plan, medications, and discharge instructions  Description: Complete learning assessment and assess knowledge base.   Interventions:  - Provide teaching at level of understanding  - Provide teaching via preferred learning methods  Outcome: Progressing     Problem: GASTROINTESTINAL - ADULT  Goal: Minimal or absence of nausea and/or vomiting  Description: INTERVENTIONS:  - Administer IV fluids if ordered to ensure adequate hydration  - Maintain NPO status until nausea and vomiting are resolved  - Nasogastric tube if ordered  - Administer ordered antiemetic medications as needed  - Provide nonpharmacologic comfort measures as appropriate  - Advance diet as tolerated, if ordered  - Consider nutrition services referral to assist patient with adequate nutrition and appropriate food choices  Outcome: Progressing  Goal: Maintains or returns to baseline bowel function  Description: INTERVENTIONS:  - Assess bowel function  - Encourage oral fluids to ensure adequate hydration  - Administer IV fluids if ordered to ensure adequate hydration  - Administer ordered medications as needed  - Encourage mobilization and activity  - Consider nutritional services referral to assist patient with adequate nutrition and appropriate food choices  Outcome: Progressing     Problem: METABOLIC, FLUID AND ELECTROLYTES - ADULT  Goal: Electrolytes maintained within normal limits  Description: INTERVENTIONS:  - Monitor labs and assess patient for signs and symptoms of electrolyte imbalances  - Administer electrolyte replacement as ordered  - Monitor response to electrolyte replacements, including repeat lab results as appropriate  - Instruct patient on fluid and nutrition as appropriate  Outcome: Progressing  Goal: Fluid balance maintained  Description: INTERVENTIONS:  - Monitor labs   - Monitor I/O and WT  - Instruct patient on fluid and nutrition as appropriate  - Assess for signs & symptoms of volume excess or deficit  Outcome: Progressing

## 2023-09-01 NOTE — NURSING NOTE
Loss of IV access occurred. Chase Olsen PA-C made aware. Instructed okay to keep IV out, will check labs in AM. Pt resting comfortably in bed with call light within reach.

## 2023-09-02 LAB
ANION GAP SERPL CALCULATED.3IONS-SCNC: 6 MMOL/L
BUN SERPL-MCNC: 4 MG/DL (ref 5–25)
CALCIUM SERPL-MCNC: 8.5 MG/DL (ref 8.4–10.2)
CHLORIDE SERPL-SCNC: 110 MMOL/L (ref 96–108)
CO2 SERPL-SCNC: 27 MMOL/L (ref 21–32)
CREAT SERPL-MCNC: 0.76 MG/DL (ref 0.6–1.3)
GFR SERPL CREATININE-BSD FRML MDRD: 72 ML/MIN/1.73SQ M
GLUCOSE SERPL-MCNC: 105 MG/DL (ref 65–140)
GLUCOSE SERPL-MCNC: 121 MG/DL (ref 65–140)
GLUCOSE SERPL-MCNC: 144 MG/DL (ref 65–140)
GLUCOSE SERPL-MCNC: 84 MG/DL (ref 65–140)
GLUCOSE SERPL-MCNC: 91 MG/DL (ref 65–140)
MAGNESIUM SERPL-MCNC: 1.6 MG/DL (ref 1.9–2.7)
POTASSIUM SERPL-SCNC: 3.5 MMOL/L (ref 3.5–5.3)
SODIUM SERPL-SCNC: 143 MMOL/L (ref 135–147)

## 2023-09-02 PROCEDURE — 80048 BASIC METABOLIC PNL TOTAL CA: CPT | Performed by: PHYSICIAN ASSISTANT

## 2023-09-02 PROCEDURE — 83735 ASSAY OF MAGNESIUM: CPT | Performed by: PHYSICIAN ASSISTANT

## 2023-09-02 PROCEDURE — 82948 REAGENT STRIP/BLOOD GLUCOSE: CPT

## 2023-09-02 PROCEDURE — 99232 SBSQ HOSP IP/OBS MODERATE 35: CPT

## 2023-09-02 RX ORDER — LANOLIN ALCOHOL/MO/W.PET/CERES
800 CREAM (GRAM) TOPICAL ONCE
Status: COMPLETED | OUTPATIENT
Start: 2023-09-02 | End: 2023-09-02

## 2023-09-02 RX ADMIN — LEVOTHYROXINE SODIUM 88 MCG: 88 TABLET ORAL at 05:12

## 2023-09-02 RX ADMIN — ASPIRIN 81 MG: 81 TABLET, COATED ORAL at 08:43

## 2023-09-02 RX ADMIN — PRAVASTATIN SODIUM 80 MG: 40 TABLET ORAL at 17:14

## 2023-09-02 RX ADMIN — Medication 250 MG: at 08:43

## 2023-09-02 RX ADMIN — ALPRAZOLAM 0.25 MG: 0.25 TABLET ORAL at 21:03

## 2023-09-02 RX ADMIN — Medication 250 MG: at 17:14

## 2023-09-02 RX ADMIN — Medication 800 MG: at 11:26

## 2023-09-02 RX ADMIN — HEPARIN SODIUM 5000 UNITS: 5000 INJECTION INTRAVENOUS; SUBCUTANEOUS at 05:12

## 2023-09-02 RX ADMIN — VANCOMYCIN HYDROCHLORIDE 250 MG: 250 CAPSULE ORAL at 14:27

## 2023-09-02 RX ADMIN — HEPARIN SODIUM 5000 UNITS: 5000 INJECTION INTRAVENOUS; SUBCUTANEOUS at 14:28

## 2023-09-02 RX ADMIN — VANCOMYCIN HYDROCHLORIDE 250 MG: 250 CAPSULE ORAL at 05:12

## 2023-09-02 RX ADMIN — HEPARIN SODIUM 5000 UNITS: 5000 INJECTION INTRAVENOUS; SUBCUTANEOUS at 21:03

## 2023-09-02 RX ADMIN — VANCOMYCIN HYDROCHLORIDE 250 MG: 250 CAPSULE ORAL at 21:03

## 2023-09-02 NOTE — PROGRESS NOTES
[address]  Progress Note  Name: Kathi Appiah  MRN: [id number]  Unit/Bed#: 964-23 I Date of Admission: 8/25/2023   Date of Service: 9/2/2023 I Hospital Day: 7    Assessment/Plan   * C. difficile colitis  Assessment & Plan  · C diff testing positive  · C/w Increased PO vanc 250 mg q6 (D2)  · Monitor stool output - ongoing tenesmus and increased frequency, somewhat improved from 2 days prior  · Advancement of diet to bland, low residue  · Banatrol as needed  · Low dose bentyl p.r.n for cramping  · Appreciate ongoing GI recs        Sigmoid diverticulitis  Assessment & Plan  · Presented to the emergency room with complaints of abdominal pain. Associated with fever, nausea, and non-bloody, mucus loose stools  · Patient was admitted from 8/13-8/16 for jejunal diverticulitis. Was being treated with PO Augmentin as outpatient  · CT abd/pelvis: Interval increased diffuse sigmoid wall thickening with adjacent inflammatory change, most concerning for acute diverticulitis. No fluid collections. 2. Improving inflamed jejunal diverticulum. · Remains afebrile, wo leukocytosis  · Diet was advanced to bland, low residue   · Zosyn discontinued  · May require a General Surgery evaluation during this hospitalization  · The patient has refused surgery for the recurrent diverticulitis in the past.  · Appreciate GI consult      Type 2 diabetes mellitus with hypoglycemia without coma, without long-term current use of insulin ([address])  Assessment & Plan  Lab Results   Component Value Date    HGBA1C 6.0 (H) 08/28/2023       (P) [id number]     · Diet-controlled  · ISS with blood glucose monitoring  · Advanced diet to bland, low residue              VTE Pharmacologic Prophylaxis: VTE Score: 5 heparin    Patient Centered Rounds: I performed bedside rounds with nursing staff today.   Discussions with Specialists or Other Care Team Provider:     Education and Discussions with Family / Patient: Patient declined call to . Total Time Spent on Date of Encounter in care of patient: 35 minutes This time was spent on one or more of the following: performing physical exam; counseling and coordination of care; obtaining or reviewing history; documenting in the medical record; reviewing/ordering tests, medications or procedures; communicating with other healthcare professionals and discussing with patient's family/caregivers. Current Length of Stay: 7 day(s)  Current Patient Status: Inpatient   Certification Statement: The patient will continue to require additional inpatient hospital stay due to C diff treatment  Discharge Plan: Anticipate discharge in 24-48 hrs to home. Code Status: Level 1 - Full Code    Subjective:   Ongoing tenesmus and frequency. Notes stools are more formed. Tolerating diet without nausea    Objective:   Vitals:   Temp (24hrs), Av °F (36.7 °C), Min:97.9 °F (36.6 °C), Max:98 °F (36.7 °C)    Temp:  [97.9 °F (36.6 °C)-98 °F (36.7 °C)] 97.9 °F (36.6 °C)  HR:  [63-66] 64  Resp:  [18] 18  BP: (142-148)/(68-69) 142/69  SpO2:  [95 %-96 %] 95 %  Body mass index is 30.5 kg/m². Input and Output Summary (last 24 hours): Intake/Output Summary (Last 24 hours) at 2023 1126  Last data filed at 2023 5240  Gross per 24 hour   Intake 600 ml   Output --   Net 600 ml       Physical Exam:   Physical Exam  Constitutional:       General: She is not in acute distress. HENT:      Head: Normocephalic and atraumatic. Cardiovascular:      Rate and Rhythm: Normal rate and regular rhythm. Pulses: Normal pulses. Heart sounds: Normal heart sounds. Abdominal:      General: Abdomen is flat. Bowel sounds are normal. There is no distension. Palpations: Abdomen is soft. Musculoskeletal:      Right lower leg: No edema. Left lower leg: No edema. Skin:     General: Skin is warm and dry. Neurological:      General: No focal deficit present.       Mental Status: She is alert and oriented to person, place, and time. Cranial Nerves: No cranial nerve deficit. Psychiatric:         Mood and Affect: Mood normal.         Behavior: Behavior normal.          Additional Data:   Labs:  Results from last 7 days   Lab Units 09/01/23  0440   WBC Thousand/uL 5.08   HEMOGLOBIN g/dL 11.0*   HEMATOCRIT % 33.9*   PLATELETS Thousands/uL 273   NEUTROS PCT % 54   LYMPHS PCT % 25   MONOS PCT % 15*   EOS PCT % 4     Results from last 7 days   Lab Units 09/02/23  0459 08/30/23  0433 08/29/23  0622   SODIUM mmol/L 143   < > 141   POTASSIUM mmol/L 3.5   < > 3.6   CHLORIDE mmol/L 110*   < > 108   CO2 mmol/L 27   < > 26   BUN mg/dL 4*   < > 4*   CREATININE mg/dL 0.76   < > 0.76   ANION GAP mmol/L 6   < > 7   CALCIUM mg/dL 8.5   < > 8.6   ALBUMIN g/dL  --   --  3.3*   TOTAL BILIRUBIN mg/dL  --   --  0.57   ALK PHOS U/L  --   --  81   ALT U/L  --   --  8   AST U/L  --   --  12*   GLUCOSE RANDOM mg/dL 84   < > 116    < > = values in this interval not displayed. Results from last 7 days   Lab Units 09/02/23  1112 09/02/23  0736 09/01/23  2044 09/01/23  1613 09/01/23  0743 08/31/23  2121 08/31/23  1522 08/31/23  1111 08/31/23  0710 08/30/23  2111 08/30/23  1537 08/30/23  1124   POC GLUCOSE mg/dl 105 91 101 110 91 120 120 113 99 108 132 101     Results from last 7 days   Lab Units 08/28/23  0529   HEMOGLOBIN A1C % 6.0*     Results from last 7 days   Lab Units 08/28/23  0529   LACTIC ACID mmol/L 0.8   PROCALCITONIN ng/ml 0.20       Lines/Drains:  Invasive Devices     Peripheral Intravenous Line  Duration           Peripheral IV 08/27/23 Ventral (anterior); Right Forearm 5 days    Peripheral IV 08/31/23 Left;Ventral (anterior) Wrist 1 day                      Imaging: No pertinent imaging reviewed.     Recent Cultures (last 7 days):         Last 24 Hours Medication List:   Current Facility-Administered Medications   Medication Dose Route Frequency Provider Last Rate   • acetaminophen  650 mg Oral Q6H PRN Shaun JEEVAN Calvillo     • ALPRAZolam  0.25 mg Oral BID PRN Justin Juarez MD     • aluminum-magnesium hydroxide-simethicone  30 mL Oral Q4H PRN Fox Schreiber PA-C     • aspirin  81 mg Oral Daily Shaun Calvillo PA-C     • dextrose  25 mL Intravenous Daily PRN Yumiko Aldana,      • dicyclomine  10 mg Oral 4x Daily PRN Justin Juarez MD     • heparin (porcine)  5,000 Units Subcutaneous Q8H [facility] Shaun Calvillo PA-C     • levothyroxine  88 mcg Oral Early Morning Shaun Calvillo PA-C     • nicotine  1 patch Transdermal Daily Shaun Calvillo PA-C     • pravastatin  80 mg Oral Daily With Dinner Shaun Calvillo PA-C     • saccharomyces boulardii  250 mg Oral BID Yoselin Ha PA-C     • trimethobenzamide  200 mg Intramuscular Q6H PRN Fox Schreiber PA-C     • vancomycin oral (capsules or solution)  250 mg Oral Q6H [facility] Fox Schreiber PA-C          Today, Patient Was Seen By: Merari Forrester PA-C    **Please Note: This note may have been constructed using a voice recognition system. **

## 2023-09-02 NOTE — PLAN OF CARE
Problem: PAIN - ADULT  Goal: Verbalizes/displays adequate comfort level or baseline comfort level  Description: Interventions:  - Encourage patient to monitor pain and request assistance  - Assess pain using appropriate pain scale  - Administer analgesics based on type and severity of pain and evaluate response  - Implement non-pharmacological measures as appropriate and evaluate response  - Consider cultural and social influences on pain and pain management  - Notify physician/advanced practitioner if interventions unsuccessful or patient reports new pain  Outcome: Progressing     Problem: INFECTION - ADULT  Goal: Absence or prevention of progression during hospitalization  Description: INTERVENTIONS:  - Assess and monitor for signs and symptoms of infection  - Monitor lab/diagnostic results  - Monitor all insertion sites, i.e. indwelling lines, tubes, and drains  - Monitor endotracheal if appropriate and nasal secretions for changes in amount and color  - [place] appropriate cooling/warming therapies per order  - Administer medications as ordered  - Instruct and encourage patient and family to use good hand hygiene technique  - Identify and instruct in appropriate isolation precautions for identified infection/condition  Outcome: Progressing     Problem: SAFETY ADULT  Goal: Patient will remain free of falls  Description: INTERVENTIONS:  - Educate patient/family on patient safety including physical limitations  - Instruct patient to call for assistance with activity   - Consult OT/PT to assist with strengthening/mobility   - Keep Call bell within reach  - Keep bed low and locked with side rails adjusted as appropriate  - Keep care items and personal belongings within reach  - Initiate and maintain comfort rounds  Outcome: Progressing     Problem: DISCHARGE PLANNING  Goal: Discharge to home or other facility with appropriate resources  Description: INTERVENTIONS:  - Identify barriers to discharge w/patient and caregiver  - Arrange for needed discharge resources and transportation as appropriate  - Identify discharge learning needs (meds, wound care, etc.)  - Arrange for interpretive services to assist at discharge as needed  - Refer to Case Management Department for coordinating discharge planning if the patient needs post-hospital services based on physician/advanced practitioner order or complex needs related to functional status, cognitive ability, or social support system  Outcome: Progressing     Problem: Knowledge Deficit  Goal: Patient/family/caregiver demonstrates understanding of disease process, treatment plan, medications, and discharge instructions  Description: Complete learning assessment and assess knowledge base.   Interventions:  - Provide teaching at level of understanding  - Provide teaching via preferred learning methods  Outcome: Progressing     Problem: GASTROINTESTINAL - ADULT  Goal: Minimal or absence of nausea and/or vomiting  Description: INTERVENTIONS:  - Administer IV fluids if ordered to ensure adequate hydration  - Maintain NPO status until nausea and vomiting are resolved  - Nasogastric tube if ordered  - Administer ordered antiemetic medications as needed  - Provide nonpharmacologic comfort measures as appropriate  - Advance diet as tolerated, if ordered  - Consider nutrition services referral to assist patient with adequate nutrition and appropriate food choices  Outcome: Progressing  Goal: Maintains or returns to baseline bowel function  Description: INTERVENTIONS:  - Assess bowel function  - Encourage oral fluids to ensure adequate hydration  - Administer IV fluids if ordered to ensure adequate hydration  - Administer ordered medications as needed  - Encourage mobilization and activity  - Consider nutritional services referral to assist patient with adequate nutrition and appropriate food choices  Outcome: Progressing     Problem: METABOLIC, FLUID AND ELECTROLYTES - ADULT  Goal: Electrolytes maintained within normal limits  Description: INTERVENTIONS:  - Monitor labs and assess patient for signs and symptoms of electrolyte imbalances  - Administer electrolyte replacement as ordered  - Monitor response to electrolyte replacements, including repeat lab results as appropriate  - Instruct patient on fluid and nutrition as appropriate  Outcome: Progressing  Goal: Fluid balance maintained  Description: INTERVENTIONS:  - Monitor labs   - Monitor I/O and WT  - Instruct patient on fluid and nutrition as appropriate  - Assess for signs & symptoms of volume excess or deficit  Outcome: Progressing     Problem: Nutrition/Hydration-ADULT  Goal: Nutrient/Hydration intake appropriate for improving, restoring or maintaining nutritional needs  Description: Monitor and assess patient's nutrition/hydration status for malnutrition. Collaborate with interdisciplinary team and initiate plan and interventions as ordered. Monitor patient's weight and dietary intake as ordered or per policy. Utilize nutrition screening tool and intervene as necessary. Determine patient's food preferences and provide high-protein, high-caloric foods as appropriate.      INTERVENTIONS:  - Monitor oral intake, urinary output, labs, and treatment plans  - Assess nutrition and hydration status and recommend course of action  - Evaluate amount of meals eaten  - Assist patient with eating if necessary   - Allow adequate time for meals  - Recommend/ encourage appropriate diets, oral nutritional supplements, and vitamin/mineral supplements  - Order, calculate, and assess calorie counts as needed  - Recommend, monitor, and adjust tube feedings and TPN/PPN based on assessed needs  - Assess need for intravenous fluids  - Provide specific nutrition/hydration education as appropriate  - Include patient/family/caregiver in decisions related to nutrition  Outcome: Progressing

## 2023-09-02 NOTE — ASSESSMENT & PLAN NOTE
· C diff testing positive  · C/w Increased PO vanc 250 mg q6 (D2)  · Monitor stool output - ongoing tenesmus and increased frequency, somewhat improved from 2 days prior  · Advancement of diet to bland, low residue  · Banatrol as needed  · Low dose bentyl p.r.n for cramping  · Appreciate ongoing GI recs

## 2023-09-02 NOTE — ASSESSMENT & PLAN NOTE
Lab Results   Component Value Date    HGBA1C 6.0 (H) 08/28/2023       (P) [id number]     · Diet-controlled  · ISS with blood glucose monitoring  · Advanced diet to bland, low residue

## 2023-09-03 PROBLEM — E87.6 HYPOKALEMIA: Status: RESOLVED | Noted: 2023-08-27 | Resolved: 2023-09-03

## 2023-09-03 LAB
ANION GAP SERPL CALCULATED.3IONS-SCNC: 6 MMOL/L
BUN SERPL-MCNC: 5 MG/DL (ref 5–25)
CALCIUM SERPL-MCNC: 8.5 MG/DL (ref 8.4–10.2)
CHLORIDE SERPL-SCNC: 109 MMOL/L (ref 96–108)
CO2 SERPL-SCNC: 29 MMOL/L (ref 21–32)
CREAT SERPL-MCNC: 0.72 MG/DL (ref 0.6–1.3)
GFR SERPL CREATININE-BSD FRML MDRD: 77 ML/MIN/1.73SQ M
GLUCOSE SERPL-MCNC: 118 MG/DL (ref 65–140)
GLUCOSE SERPL-MCNC: 133 MG/DL (ref 65–140)
GLUCOSE SERPL-MCNC: 141 MG/DL (ref 65–140)
GLUCOSE SERPL-MCNC: 81 MG/DL (ref 65–140)
GLUCOSE SERPL-MCNC: 84 MG/DL (ref 65–140)
POTASSIUM SERPL-SCNC: 3.6 MMOL/L (ref 3.5–5.3)
SODIUM SERPL-SCNC: 144 MMOL/L (ref 135–147)

## 2023-09-03 PROCEDURE — 82948 REAGENT STRIP/BLOOD GLUCOSE: CPT

## 2023-09-03 PROCEDURE — 80048 BASIC METABOLIC PNL TOTAL CA: CPT

## 2023-09-03 PROCEDURE — 99232 SBSQ HOSP IP/OBS MODERATE 35: CPT

## 2023-09-03 RX ADMIN — PRAVASTATIN SODIUM 80 MG: 40 TABLET ORAL at 15:32

## 2023-09-03 RX ADMIN — VANCOMYCIN HYDROCHLORIDE 250 MG: 250 CAPSULE ORAL at 03:44

## 2023-09-03 RX ADMIN — HEPARIN SODIUM 5000 UNITS: 5000 INJECTION INTRAVENOUS; SUBCUTANEOUS at 13:48

## 2023-09-03 RX ADMIN — HEPARIN SODIUM 5000 UNITS: 5000 INJECTION INTRAVENOUS; SUBCUTANEOUS at 05:36

## 2023-09-03 RX ADMIN — VANCOMYCIN HYDROCHLORIDE 250 MG: 250 CAPSULE ORAL at 15:21

## 2023-09-03 RX ADMIN — HEPARIN SODIUM 5000 UNITS: 5000 INJECTION INTRAVENOUS; SUBCUTANEOUS at 21:25

## 2023-09-03 RX ADMIN — Medication 250 MG: at 08:45

## 2023-09-03 RX ADMIN — ALPRAZOLAM 0.25 MG: 0.25 TABLET ORAL at 21:25

## 2023-09-03 RX ADMIN — VANCOMYCIN HYDROCHLORIDE 250 MG: 250 CAPSULE ORAL at 21:25

## 2023-09-03 RX ADMIN — ASPIRIN 81 MG: 81 TABLET, COATED ORAL at 08:45

## 2023-09-03 RX ADMIN — Medication 250 MG: at 17:49

## 2023-09-03 RX ADMIN — VANCOMYCIN HYDROCHLORIDE 250 MG: 250 CAPSULE ORAL at 10:37

## 2023-09-03 RX ADMIN — LEVOTHYROXINE SODIUM 88 MCG: 88 TABLET ORAL at 05:36

## 2023-09-03 NOTE — ASSESSMENT & PLAN NOTE
· Presented to the emergency room with complaints of abdominal pain. Associated with fever, nausea, and non-bloody, mucus loose stools  · Patient was admitted from 8/13-8/16 for jejunal diverticulitis. Was being treated with PO Augmentin as outpatient  · CT abd/pelvis: Interval increased diffuse sigmoid wall thickening with adjacent inflammatory change, most concerning for acute diverticulitis. No fluid collections. 2. Improving inflamed jejunal diverticulum.   · Remains afebrile, wo leukocytosis  · Diet was advanced to bland, low residue   · Zosyn discontinued  · Outpatient general surgery evaluation - note the patient has refused surgery for the recurrent diverticulitis in the past.  · Appreciate GI consult

## 2023-09-03 NOTE — PLAN OF CARE
Problem: PAIN - ADULT  Goal: Verbalizes/displays adequate comfort level or baseline comfort level  Description: Interventions:  - Encourage patient to monitor pain and request assistance  - Assess pain using appropriate pain scale  - Administer analgesics based on type and severity of pain and evaluate response  - Implement non-pharmacological measures as appropriate and evaluate response  - Consider cultural and social influences on pain and pain management  - Notify physician/advanced practitioner if interventions unsuccessful or patient reports new pain  Outcome: Progressing     Problem: INFECTION - ADULT  Goal: Absence or prevention of progression during hospitalization  Description: INTERVENTIONS:  - Assess and monitor for signs and symptoms of infection  - Monitor lab/diagnostic results  - Monitor all insertion sites, i.e. indwelling lines, tubes, and drains  - Monitor endotracheal if appropriate and nasal secretions for changes in amount and color  - [place] appropriate cooling/warming therapies per order  - Administer medications as ordered  - Instruct and encourage patient and family to use good hand hygiene technique  - Identify and instruct in appropriate isolation precautions for identified infection/condition  Outcome: Progressing     Problem: SAFETY ADULT  Goal: Patient will remain free of falls  Description: INTERVENTIONS:  - Educate patient/family on patient safety including physical limitations  - Instruct patient to call for assistance with activity   - Consult OT/PT to assist with strengthening/mobility   - Keep Call bell within reach  - Keep bed low and locked with side rails adjusted as appropriate  - Keep care items and personal belongings within reach  - Initiate and maintain comfort rounds  - Make Fall Risk Sign visible to staff  - Offer Toileting every 4 Hours, in advance of need  - Obtain necessary fall risk management equipment  - Apply yellow socks and bracelet for high fall risk patients  - Consider moving patient to room near nurses station  Outcome: Progressing     Problem: DISCHARGE PLANNING  Goal: Discharge to home or other facility with appropriate resources  Description: INTERVENTIONS:  - Identify barriers to discharge w/patient and caregiver  - Arrange for needed discharge resources and transportation as appropriate  - Identify discharge learning needs (meds, wound care, etc.)  - Arrange for interpretive services to assist at discharge as needed  - Refer to Case Management Department for coordinating discharge planning if the patient needs post-hospital services based on physician/advanced practitioner order or complex needs related to functional status, cognitive ability, or social support system  Outcome: Progressing     Problem: Knowledge Deficit  Goal: Patient/family/caregiver demonstrates understanding of disease process, treatment plan, medications, and discharge instructions  Description: Complete learning assessment and assess knowledge base.   Interventions:  - Provide teaching at level of understanding  - Provide teaching via preferred learning methods  Outcome: Progressing     Problem: GASTROINTESTINAL - ADULT  Goal: Minimal or absence of nausea and/or vomiting  Description: INTERVENTIONS:  - Administer IV fluids if ordered to ensure adequate hydration  - Maintain NPO status until nausea and vomiting are resolved  - Nasogastric tube if ordered  - Administer ordered antiemetic medications as needed  - Provide nonpharmacologic comfort measures as appropriate  - Advance diet as tolerated, if ordered  - Consider nutrition services referral to assist patient with adequate nutrition and appropriate food choices  Outcome: Progressing  Goal: Maintains or returns to baseline bowel function  Description: INTERVENTIONS:  - Assess bowel function  - Encourage oral fluids to ensure adequate hydration  - Administer IV fluids if ordered to ensure adequate hydration  - Administer ordered medications as needed  - Encourage mobilization and activity  - Consider nutritional services referral to assist patient with adequate nutrition and appropriate food choices  Outcome: Progressing     Problem: METABOLIC, FLUID AND ELECTROLYTES - ADULT  Goal: Electrolytes maintained within normal limits  Description: INTERVENTIONS:  - Monitor labs and assess patient for signs and symptoms of electrolyte imbalances  - Administer electrolyte replacement as ordered  - Monitor response to electrolyte replacements, including repeat lab results as appropriate  - Instruct patient on fluid and nutrition as appropriate  Outcome: Progressing  Goal: Fluid balance maintained  Description: INTERVENTIONS:  - Monitor labs   - Monitor I/O and WT  - Instruct patient on fluid and nutrition as appropriate  - Assess for signs & symptoms of volume excess or deficit  Outcome: Progressing     Problem: Nutrition/Hydration-ADULT  Goal: Nutrient/Hydration intake appropriate for improving, restoring or maintaining nutritional needs  Description: Monitor and assess patient's nutrition/hydration status for malnutrition. Collaborate with interdisciplinary team and initiate plan and interventions as ordered. Monitor patient's weight and dietary intake as ordered or per policy. Utilize nutrition screening tool and intervene as necessary. Determine patient's food preferences and provide high-protein, high-caloric foods as appropriate.      INTERVENTIONS:  - Monitor oral intake, urinary output, labs, and treatment plans  - Assess nutrition and hydration status and recommend course of action  - Evaluate amount of meals eaten  - Assist patient with eating if necessary   - Allow adequate time for meals  - Recommend/ encourage appropriate diets, oral nutritional supplements, and vitamin/mineral supplements  - Order, calculate, and assess calorie counts as needed  - Recommend, monitor, and adjust tube feedings and TPN/PPN based on assessed needs  - Assess need for intravenous fluids  - Provide specific nutrition/hydration education as appropriate  - Include patient/family/caregiver in decisions related to nutrition  Outcome: Progressing

## 2023-09-03 NOTE — PLAN OF CARE
Problem: PAIN - ADULT  Goal: Verbalizes/displays adequate comfort level or baseline comfort level  Description: Interventions:  - Encourage patient to monitor pain and request assistance  - Assess pain using appropriate pain scale  - Administer analgesics based on type and severity of pain and evaluate response  - Implement non-pharmacological measures as appropriate and evaluate response  - Consider cultural and social influences on pain and pain management  - Notify physician/advanced practitioner if interventions unsuccessful or patient reports new pain  Outcome: Progressing     Problem: INFECTION - ADULT  Goal: Absence or prevention of progression during hospitalization  Description: INTERVENTIONS:  - Assess and monitor for signs and symptoms of infection  - Monitor lab/diagnostic results  - Monitor all insertion sites, i.e. indwelling lines, tubes, and drains  - Monitor endotracheal if appropriate and nasal secretions for changes in amount and color  - [place] appropriate cooling/warming therapies per order  - Administer medications as ordered  - Instruct and encourage patient and family to use good hand hygiene technique  - Identify and instruct in appropriate isolation precautions for identified infection/condition  Outcome: Progressing     Problem: SAFETY ADULT  Goal: Patient will remain free of falls  Description: INTERVENTIONS:  - Educate patient/family on patient safety including physical limitations  - Instruct patient to call for assistance with activity   - Consult OT/PT to assist with strengthening/mobility   - Keep Call bell within reach  - Keep bed low and locked with side rails adjusted as appropriate  - Keep care items and personal belongings within reach  - Initiate and maintain comfort rounds  - Make Fall Risk Sign visible to staff  Outcome: Progressing     Problem: DISCHARGE PLANNING  Goal: Discharge to home or other facility with appropriate resources  Description: INTERVENTIONS:  - Identify barriers to discharge w/patient and caregiver  - Arrange for needed discharge resources and transportation as appropriate  - Identify discharge learning needs (meds, wound care, etc.)  - Arrange for interpretive services to assist at discharge as needed  - Refer to Case Management Department for coordinating discharge planning if the patient needs post-hospital services based on physician/advanced practitioner order or complex needs related to functional status, cognitive ability, or social support system  Outcome: Progressing     Problem: Knowledge Deficit  Goal: Patient/family/caregiver demonstrates understanding of disease process, treatment plan, medications, and discharge instructions  Description: Complete learning assessment and assess knowledge base.   Interventions:  - Provide teaching at level of understanding  - Provide teaching via preferred learning methods  Outcome: Progressing     Problem: GASTROINTESTINAL - ADULT  Goal: Minimal or absence of nausea and/or vomiting  Description: INTERVENTIONS:  - Administer IV fluids if ordered to ensure adequate hydration  - Maintain NPO status until nausea and vomiting are resolved  - Nasogastric tube if ordered  - Administer ordered antiemetic medications as needed  - Provide nonpharmacologic comfort measures as appropriate  - Advance diet as tolerated, if ordered  - Consider nutrition services referral to assist patient with adequate nutrition and appropriate food choices  Outcome: Progressing  Goal: Maintains or returns to baseline bowel function  Description: INTERVENTIONS:  - Assess bowel function  - Encourage oral fluids to ensure adequate hydration  - Administer IV fluids if ordered to ensure adequate hydration  - Administer ordered medications as needed  - Encourage mobilization and activity  - Consider nutritional services referral to assist patient with adequate nutrition and appropriate food choices  Outcome: Progressing     Problem: METABOLIC, FLUID AND ELECTROLYTES - ADULT  Goal: Electrolytes maintained within normal limits  Description: INTERVENTIONS:  - Monitor labs and assess patient for signs and symptoms of electrolyte imbalances  - Administer electrolyte replacement as ordered  - Monitor response to electrolyte replacements, including repeat lab results as appropriate  - Instruct patient on fluid and nutrition as appropriate  Outcome: Progressing  Goal: Fluid balance maintained  Description: INTERVENTIONS:  - Monitor labs   - Monitor I/O and WT  - Instruct patient on fluid and nutrition as appropriate  - Assess for signs & symptoms of volume excess or deficit  Outcome: Progressing     Problem: Nutrition/Hydration-ADULT  Goal: Nutrient/Hydration intake appropriate for improving, restoring or maintaining nutritional needs  Description: Monitor and assess patient's nutrition/hydration status for malnutrition. Collaborate with interdisciplinary team and initiate plan and interventions as ordered. Monitor patient's weight and dietary intake as ordered or per policy. Utilize nutrition screening tool and intervene as necessary. Determine patient's food preferences and provide high-protein, high-caloric foods as appropriate.      INTERVENTIONS:  - Monitor oral intake, urinary output, labs, and treatment plans  - Assess nutrition and hydration status and recommend course of action  - Evaluate amount of meals eaten  - Assist patient with eating if necessary   - Allow adequate time for meals  - Recommend/ encourage appropriate diets, oral nutritional supplements, and vitamin/mineral supplements  - Order, calculate, and assess calorie counts as needed  - Recommend, monitor, and adjust tube feedings and TPN/PPN based on assessed needs  - Assess need for intravenous fluids  - Provide specific nutrition/hydration education as appropriate  - Include patient/family/caregiver in decisions related to nutrition  Outcome: Progressing

## 2023-09-03 NOTE — PROGRESS NOTES
[address]  Progress Note  Name: Alisia Dietz  MRN: [id number]  Unit/Bed#: 579-37 I Date of Admission: 8/25/2023   Date of Service: 9/3/2023 I Hospital Day: 8    Assessment/Plan   * C. difficile colitis  Assessment & Plan  · C diff testing positive  · C/w Increased PO vanc 250 mg q6 (D4)  · Monitor stool output - with decreased frequency today, still w/ ongoing tenesmus   · Advancement of diet to bland, low residue  · Banatrol as needed  · Low dose bentyl p.r.n for cramping  · Appreciate ongoing GI recs        Sigmoid diverticulitis  Assessment & Plan  · Presented to the emergency room with complaints of abdominal pain. Associated with fever, nausea, and non-bloody, mucus loose stools  · Patient was admitted from 8/13-8/16 for jejunal diverticulitis. Was being treated with PO Augmentin as outpatient  · CT abd/pelvis: Interval increased diffuse sigmoid wall thickening with adjacent inflammatory change, most concerning for acute diverticulitis. No fluid collections. 2. Improving inflamed jejunal diverticulum. · Remains afebrile, wo leukocytosis  · Diet was advanced to bland, low residue   · Zosyn discontinued  · Outpatient general surgery evaluation - note the patient has refused surgery for the recurrent diverticulitis in the past.  · Appreciate GI consult      Type 2 diabetes mellitus with hypoglycemia without coma, without long-term current use of insulin (HCC)  Assessment & Plan  Lab Results   Component Value Date    HGBA1C 6.0 (H) 08/28/2023       (P) 108     · Diet-controlled  · ISS with blood glucose monitoring               VTE Pharmacologic Prophylaxis: VTE Score: 5 heparin    Patient Centered Rounds:   Discussions with Specialists or Other Care Team Provider:     Education and Discussions with Family / Patient: Updated  (daughter) via phone.     Total Time Spent on Date of Encounter in care of patient: 35 minutes This time was spent on one or more of the following: performing physical exam; counseling and coordination of care; obtaining or reviewing history; documenting in the medical record; reviewing/ordering tests, medications or procedures; communicating with other healthcare professionals and discussing with patient's family/caregivers. Current Length of Stay: 8 day(s)  Current Patient Status: Inpatient   Certification Statement: The patient will continue to require additional inpatient hospital stay due to C diff colitis treatmetn  Discharge Plan: Anticipate discharge in 24-48 hrs to home. Code Status: Level 1 - Full Code    Subjective:   Reports some improvement in BM frequency, notes only 2 occurrences so far today which is quite an improvement from prior. Complaining of ongoing urgency, rectal pressure when she has to have a BM. No further nausea. Tolerating diet. Right leg with pitting edema, no pain or warmth. Objective:   Vitals:   Temp (24hrs), Av.1 °F (36.7 °C), Min:97.9 °F (36.6 °C), Max:98.3 °F (36.8 °C)    Temp:  [97.9 °F (36.6 °C)-98.3 °F (36.8 °C)] 98 °F (36.7 °C)  HR:  [57-67] 63  Resp:  [14-18] 14  BP: (137-139)/(69-71) 139/70  SpO2:  [95 %-96 %] 96 %  Body mass index is 30.46 kg/m². Input and Output Summary (last 24 hours): Intake/Output Summary (Last 24 hours) at 9/3/2023 1050  Last data filed at 9/3/2023 0830  Gross per 24 hour   Intake 1020 ml   Output --   Net 1020 ml       Physical Exam:   Physical Exam  HENT:      Head: Normocephalic and atraumatic. Cardiovascular:      Rate and Rhythm: Normal rate and regular rhythm. Pulses: Normal pulses. Heart sounds: Normal heart sounds. Pulmonary:      Effort: Pulmonary effort is normal. No respiratory distress. Breath sounds: Normal breath sounds. Abdominal:      General: Abdomen is flat. Bowel sounds are normal. There is no distension. Palpations: Abdomen is soft. Tenderness: There is no abdominal tenderness. There is no guarding.    Musculoskeletal: Right lower leg: Edema present. Left lower leg: No edema. Skin:     General: Skin is warm and dry. Neurological:      Mental Status: She is alert. Psychiatric:         Mood and Affect: Mood normal.         Behavior: Behavior normal.          Additional Data:   Labs:  Results from last 7 days   Lab Units 09/01/23  0440   WBC Thousand/uL 5.08   HEMOGLOBIN g/dL 11.0*   HEMATOCRIT % 33.9*   PLATELETS Thousands/uL 273   NEUTROS PCT % 54   LYMPHS PCT % 25   MONOS PCT % 15*   EOS PCT % 4     Results from last 7 days   Lab Units 09/03/23  0534 08/30/23  0433 08/29/23  0622   SODIUM mmol/L 144   < > 141   POTASSIUM mmol/L 3.6   < > 3.6   CHLORIDE mmol/L 109*   < > 108   CO2 mmol/L 29   < > 26   BUN mg/dL 5   < > 4*   CREATININE mg/dL 0.72   < > 0.76   ANION GAP mmol/L 6   < > 7   CALCIUM mg/dL 8.5   < > 8.6   ALBUMIN g/dL  --   --  3.3*   TOTAL BILIRUBIN mg/dL  --   --  0.57   ALK PHOS U/L  --   --  81   ALT U/L  --   --  8   AST U/L  --   --  12*   GLUCOSE RANDOM mg/dL 84   < > 116    < > = values in this interval not displayed. Results from last 7 days   Lab Units 09/03/23  0730 09/02/23 2127 09/02/23  1547 09/02/23  1112 09/02/23  0736 09/01/23  2044 09/01/23  1613 09/01/23  0743 08/31/23  2121 08/31/23  1522 08/31/23  1111 08/31/23  0710   POC GLUCOSE mg/dl 81 121 144* 105 91 101 110 91 120 120 113 99     Results from last 7 days   Lab Units 08/28/23  0529   HEMOGLOBIN A1C % 6.0*     Results from last 7 days   Lab Units 08/28/23  0529   LACTIC ACID mmol/L 0.8   PROCALCITONIN ng/ml 0.20       Lines/Drains:  Invasive Devices     None                       Imaging: No pertinent imaging reviewed.     Recent Cultures (last 7 days):         Last 24 Hours Medication List:   Current Facility-Administered Medications   Medication Dose Route Frequency Provider Last Rate   • acetaminophen  650 mg Oral Q6H PRN Shaun Calvillo PA-C     • ALPRAZolam  0.25 mg Oral BID PRN Nishi Boston MD     • aluminum-magnesium hydroxide-simethicone  30 mL Oral Q4H PRN Leora Argueta PA-C     • aspirin  81 mg Oral Daily Shaun Calvillo PA-C     • dextrose  25 mL Intravenous Daily PRN Gurjit Aldana DO     • dicyclomine  10 mg Oral 4x Daily PRN Damaso Harvey MD     • heparin (porcine)  5,000 Units Subcutaneous Q8H [address] Shaun Calvillo PA-C     • levothyroxine  88 mcg Oral Early Morning Shaun Calvillo PA-C     • nicotine  1 patch Transdermal Daily Shaun Calvillo PA-C     • pravastatin  80 mg Oral Daily With Dinner Shaun Calvillo PA-C     • saccharomyces boulardii  250 mg Oral BID Yoselin Monzon PA-C     • trimethobenzamide  200 mg Intramuscular Q6H PRN Leora Argueta PA-C     • vancomycin oral (capsules or solution)  250 mg Oral Q6H [address] [place] [address], JEEVAN          Today, Patient Was Seen By: Terese Couch PA-C    **Please Note: This note may have been constructed using a voice recognition system. **

## 2023-09-03 NOTE — ASSESSMENT & PLAN NOTE
Lab Results   Component Value Date    HGBA1C 6.0 (H) 08/28/2023       (P) 108     · Diet-controlled  · ISS with blood glucose monitoring

## 2023-09-03 NOTE — ASSESSMENT & PLAN NOTE
· C diff testing positive  · C/w Increased PO vanc 250 mg q6 (D4)  · Monitor stool output - with decreased frequency today, still w/ ongoing tenesmus   · Advancement of diet to bland, low residue  · Banatrol as needed  · Low dose bentyl p.r.n for cramping  · Appreciate ongoing GI recs

## 2023-09-04 LAB
GLUCOSE SERPL-MCNC: 100 MG/DL (ref 65–140)
GLUCOSE SERPL-MCNC: 86 MG/DL (ref 65–140)
GLUCOSE SERPL-MCNC: 92 MG/DL (ref 65–140)
GLUCOSE SERPL-MCNC: 93 MG/DL (ref 65–140)
GLUCOSE SERPL-MCNC: 93 MG/DL (ref 65–140)

## 2023-09-04 PROCEDURE — 99232 SBSQ HOSP IP/OBS MODERATE 35: CPT

## 2023-09-04 PROCEDURE — 82948 REAGENT STRIP/BLOOD GLUCOSE: CPT

## 2023-09-04 RX ADMIN — Medication 250 MG: at 08:40

## 2023-09-04 RX ADMIN — VANCOMYCIN HYDROCHLORIDE 250 MG: 250 CAPSULE ORAL at 23:03

## 2023-09-04 RX ADMIN — VANCOMYCIN HYDROCHLORIDE 250 MG: 250 CAPSULE ORAL at 05:42

## 2023-09-04 RX ADMIN — HEPARIN SODIUM 5000 UNITS: 5000 INJECTION INTRAVENOUS; SUBCUTANEOUS at 23:03

## 2023-09-04 RX ADMIN — ALPRAZOLAM 0.25 MG: 0.25 TABLET ORAL at 23:03

## 2023-09-04 RX ADMIN — ASPIRIN 81 MG: 81 TABLET, COATED ORAL at 08:40

## 2023-09-04 RX ADMIN — HEPARIN SODIUM 5000 UNITS: 5000 INJECTION INTRAVENOUS; SUBCUTANEOUS at 13:32

## 2023-09-04 RX ADMIN — PRAVASTATIN SODIUM 80 MG: 40 TABLET ORAL at 17:59

## 2023-09-04 RX ADMIN — HEPARIN SODIUM 5000 UNITS: 5000 INJECTION INTRAVENOUS; SUBCUTANEOUS at 05:42

## 2023-09-04 RX ADMIN — Medication 250 MG: at 17:59

## 2023-09-04 RX ADMIN — VANCOMYCIN HYDROCHLORIDE 250 MG: 250 CAPSULE ORAL at 17:59

## 2023-09-04 RX ADMIN — VANCOMYCIN HYDROCHLORIDE 250 MG: 250 CAPSULE ORAL at 11:32

## 2023-09-04 RX ADMIN — LEVOTHYROXINE SODIUM 88 MCG: 88 TABLET ORAL at 05:42

## 2023-09-04 NOTE — ASSESSMENT & PLAN NOTE
Lab Results   Component Value Date    HGBA1C 6.0 (H) 08/28/2023       (P) [id number]     · Diet-controlled  · ISS with blood glucose monitoring

## 2023-09-04 NOTE — PROGRESS NOTES
[address]  Progress Note  Name: Sonia Tamayo  MRN: [id number]  Unit/Bed#: 292-22 I Date of Admission: 8/25/2023   Date of Service: 9/4/2023 I Hospital Day: 9    Assessment/Plan   * C. difficile colitis  Assessment & Plan  · C diff testing positive  · C/w Increased PO vanc 250 mg q6 (D5)  · Monitor stool output - still reporting high frequency & urgency of stools  · Advancement of diet to bland, low residue  · Banatrol as needed  · Low dose bentyl p.r.n for cramping  · Appreciate ongoing GI recs  · Now requesting [facility] once discharged -PT/OT order placed, d/w CM        Sigmoid diverticulitis  Assessment & Plan  · Presented to the emergency room with complaints of abdominal pain. Associated with fever, nausea, and non-bloody, mucus loose stools  · Patient was admitted from 8/13-8/16 for jejunal diverticulitis. Was being treated with PO Augmentin as outpatient  · CT abd/pelvis: Interval increased diffuse sigmoid wall thickening with adjacent inflammatory change, most concerning for acute diverticulitis. No fluid collections. 2. Improving inflamed jejunal diverticulum. · Remains afebrile, wo leukocytosis  · Diet was advanced to bland, low residue   · Zosyn discontinued  · Outpatient general surgery evaluation - note the patient has refused surgery for the recurrent diverticulitis in the past.  · Appreciate GI consult      Type 2 diabetes mellitus with hypoglycemia without coma, without long-term current use of insulin (HCC)  Assessment & Plan  Lab Results   Component Value Date    HGBA1C 6.0 (H) 08/28/2023       (P) [id number]     · Diet-controlled  · ISS with blood glucose monitoring                 VTE Pharmacologic Prophylaxis: VTE Score: 5 heparin    Patient Centered Rounds:  Discussions with Specialists or Other Care Team Provider:     Education and Discussions with Family / Patient: Patient declined call to .      Total Time Spent on Date of Encounter in care of patient: 35 minutes This time was spent on one or more of the following: performing physical exam; counseling and coordination of care; obtaining or reviewing history; documenting in the medical record; reviewing/ordering tests, medications or procedures; communicating with other healthcare professionals and discussing with patient's family/caregivers. Current Length of Stay: 9 day(s)  Current Patient Status: Inpatient   Certification Statement: The patient will continue to require additional inpatient hospital stay due to PT/OT evaluation, C diff treatment, RLE duplex  Discharge Plan: Anticipate discharge tomorrow to home with home services. Code Status: Level 1 - Full Code    Subjective:   Reports ongoing high frequency of stools & urgency. No abdominal pain or cramping. No N/V/D. Requesting visiting nurses at discharge    Objective:     Vitals:   Temp (24hrs), Av.2 °F (36.8 °C), Min:98.1 °F (36.7 °C), Max:98.3 °F (36.8 °C)    Temp:  [98.1 °F (36.7 °C)-98.3 °F (36.8 °C)] 98.1 °F (36.7 °C)  HR:  [61-66] 66  Resp:  [15] 15  BP: (142-147)/(74-77) 147/74  SpO2:  [94 %-95 %] 94 %  Body mass index is 30.34 kg/m². Input and Output Summary (last 24 hours): Intake/Output Summary (Last 24 hours) at 2023 1158  Last data filed at 2023 0754  Gross per 24 hour   Intake 540 ml   Output --   Net 540 ml       Physical Exam:   Physical Exam  HENT:      Head: Normocephalic and atraumatic. Cardiovascular:      Rate and Rhythm: Normal rate and regular rhythm. Pulses: Normal pulses. Heart sounds: Normal heart sounds. No murmur heard. Pulmonary:      Effort: Pulmonary effort is normal.      Breath sounds: Normal breath sounds. Abdominal:      General: Abdomen is flat. Palpations: Abdomen is soft. Tenderness: There is no abdominal tenderness. There is no guarding. Comments: Hyperactive BS   Musculoskeletal:      Right lower leg: No edema. Left lower leg: No edema. Comments: Edema resolved today with elevation   Skin:     General: Skin is warm and dry. Neurological:      General: No focal deficit present. Mental Status: She is alert. Cranial Nerves: No cranial nerve deficit. Psychiatric:         Mood and Affect: Mood normal.         Behavior: Behavior normal.          Additional Data:   Labs:  Results from last 7 days   Lab Units 09/01/23  0440   WBC Thousand/uL 5.08   HEMOGLOBIN g/dL 11.0*   HEMATOCRIT % 33.9*   PLATELETS Thousands/uL 273   NEUTROS PCT % 54   LYMPHS PCT % 25   MONOS PCT % 15*   EOS PCT % 4     Results from last 7 days   Lab Units 09/03/23  0534 08/30/23  0433 08/29/23  0622   SODIUM mmol/L 144   < > 141   POTASSIUM mmol/L 3.6   < > 3.6   CHLORIDE mmol/L 109*   < > 108   CO2 mmol/L 29   < > 26   BUN mg/dL 5   < > 4*   CREATININE mg/dL 0.72   < > 0.76   ANION GAP mmol/L 6   < > 7   CALCIUM mg/dL 8.5   < > 8.6   ALBUMIN g/dL  --   --  3.3*   TOTAL BILIRUBIN mg/dL  --   --  0.57   ALK PHOS U/L  --   --  81   ALT U/L  --   --  8   AST U/L  --   --  12*   GLUCOSE RANDOM mg/dL 84   < > 116    < > = values in this interval not displayed. Results from last 7 days   Lab Units 09/04/23  1112 09/04/23  0713 09/03/23  2050 09/03/23  1519 09/03/23  1055 09/03/23  0730 09/02/23  2127 09/02/23  1547 09/02/23  1112 09/02/23  0736 09/01/23  2044 09/01/23  1613   POC GLUCOSE mg/dl 92 93 118 133 141* 81 121 144* 105 91 101 110               Lines/Drains:  Invasive Devices     None                       Imaging: No pertinent imaging reviewed.     Recent Cultures (last 7 days):         Last 24 Hours Medication List:   Current Facility-Administered Medications   Medication Dose Route Frequency Provider Last Rate   • acetaminophen  650 mg Oral Q6H PRN Shaun Calvillo PA-C     • ALPRAZolam  0.25 mg Oral BID PRN Zheng Hernandez MD     • aluminum-magnesium hydroxide-simethicone  30 mL Oral Q4H PRN Fara Winston PA-C     • aspirin  81 mg Oral Daily Shaun Calvillo PA-C • dextrose  25 mL Intravenous Daily PRN Efra Aldana DO     • dicyclomine  10 mg Oral 4x Daily PRN Mounika Barrios MD     • heparin (porcine)  5,000 Units Subcutaneous Q8H [facility] & assisted Shaun Calvillo PA-C     • levothyroxine  88 mcg Oral Early Morning Shaun Calvillo PA-C     • nicotine  1 patch Transdermal Daily Shaun Calvillo PA-C     • pravastatin  80 mg Oral Daily With Dinner Shaun Calvillo PA-C     • saccharomyces boulardii  250 mg Oral BID Yoselin Malloy PA-C     • trimethobenzamide  200 mg Intramuscular Q6H PRN Rubdominic Au PA-C     • vancomycin oral (capsules or solution)  250 mg Oral Q6H [address] [place], [place] [id number]          Today, Patient Was Seen By: Beverly Hinton PA-C    **Please Note: This note may have been constructed using a voice recognition system. **

## 2023-09-04 NOTE — PLAN OF CARE
Problem: PAIN - ADULT  Goal: Verbalizes/displays adequate comfort level or baseline comfort level  Description: Interventions:  - Encourage patient to monitor pain and request assistance  - Assess pain using appropriate pain scale  - Administer analgesics based on type and severity of pain and evaluate response  - Implement non-pharmacological measures as appropriate and evaluate response  - Consider cultural and social influences on pain and pain management  - Notify physician/advanced practitioner if interventions unsuccessful or patient reports new pain  Outcome: Progressing     Problem: INFECTION - ADULT  Goal: Absence or prevention of progression during hospitalization  Description: INTERVENTIONS:  - Assess and monitor for signs and symptoms of infection  - Monitor lab/diagnostic results  - Monitor all insertion sites, i.e. indwelling lines, tubes, and drains  - Monitor endotracheal if appropriate and nasal secretions for changes in amount and color  - [place] appropriate cooling/warming therapies per order  - Administer medications as ordered  - Instruct and encourage patient and family to use good hand hygiene technique  - Identify and instruct in appropriate isolation precautions for identified infection/condition  Outcome: Progressing     Problem: SAFETY ADULT  Goal: Patient will remain free of falls  Description: INTERVENTIONS:  - Educate patient/family on patient safety including physical limitations  - Instruct patient to call for assistance with activity   - Consult OT/PT to assist with strengthening/mobility   - Keep Call bell within reach  - Keep bed low and locked with side rails adjusted as appropriate  - Keep care items and personal belongings within reach  - Initiate and maintain comfort rounds  - Make Fall Risk Sign visible to staff  - Offer Toileting every two  Hours, in advance of need  - Initiate/Maintain bed alarm  - Obtain necessary fall risk management equipment: non slip socks   - Apply yellow socks and bracelet for high fall risk patients  - Consider moving patient to room near nurses station  Outcome: Progressing     Problem: DISCHARGE PLANNING  Goal: Discharge to home or other facility with appropriate resources  Description: INTERVENTIONS:  - Identify barriers to discharge w/patient and caregiver  - Arrange for needed discharge resources and transportation as appropriate  - Identify discharge learning needs (meds, wound care, etc.)  - Arrange for interpretive services to assist at discharge as needed  - Refer to Case Management Department for coordinating discharge planning if the patient needs post-hospital services based on physician/advanced practitioner order or complex needs related to functional status, cognitive ability, or social support system  Outcome: Progressing     Problem: Knowledge Deficit  Goal: Patient/family/caregiver demonstrates understanding of disease process, treatment plan, medications, and discharge instructions  Description: Complete learning assessment and assess knowledge base.   Interventions:  - Provide teaching at level of understanding  - Provide teaching via preferred learning methods  Outcome: Progressing     Problem: GASTROINTESTINAL - ADULT  Goal: Minimal or absence of nausea and/or vomiting  Description: INTERVENTIONS:  - Administer IV fluids if ordered to ensure adequate hydration  - Administer ordered antiemetic medications as needed  - Provide nonpharmacologic comfort measures as appropriate  - Advance diet as tolerated, if ordered  - Consider nutrition services referral to assist patient with adequate nutrition and appropriate food choices  Outcome: Progressing  Goal: Maintains or returns to baseline bowel function  Description: INTERVENTIONS:  - Assess bowel function  - Encourage oral fluids to ensure adequate hydration  - Administer IV fluids if ordered to ensure adequate hydration  - Administer ordered medications as needed  - Encourage mobilization and activity  - Consider nutritional services referral to assist patient with adequate nutrition and appropriate food choices  Outcome: Progressing     Problem: METABOLIC, FLUID AND ELECTROLYTES - ADULT  Goal: Electrolytes maintained within normal limits  Description: INTERVENTIONS:  - Monitor labs and assess patient for signs and symptoms of electrolyte imbalances  - Administer electrolyte replacement as ordered  - Monitor response to electrolyte replacements, including repeat lab results as appropriate  - Instruct patient on fluid and nutrition as appropriate  Outcome: Progressing  Goal: Fluid balance maintained  Description: INTERVENTIONS:  - Monitor labs   - Monitor I/O and WT  - Instruct patient on fluid and nutrition as appropriate  - Assess for signs & symptoms of volume excess or deficit  Outcome: Progressing     Problem: Nutrition/Hydration-ADULT  Goal: Nutrient/Hydration intake appropriate for improving, restoring or maintaining nutritional needs  Description: Monitor and assess patient's nutrition/hydration status for malnutrition. Collaborate with interdisciplinary team and initiate plan and interventions as ordered. Monitor patient's weight and dietary intake as ordered or per policy. Utilize nutrition screening tool and intervene as necessary. Determine patient's food preferences and provide high-protein, high-caloric foods as appropriate.      INTERVENTIONS:  - Monitor oral intake, urinary output, labs, and treatment plans  - Assess nutrition and hydration status and recommend course of action  - Evaluate amount of meals eaten  - Assist patient with eating if necessary   - Allow adequate time for meals  - Recommend/ encourage appropriate diets, oral nutritional supplements, and vitamin/mineral supplements  - Order, calculate, and assess calorie counts as needed  - Recommend, monitor, and adjust tube feedings and TPN/PPN based on assessed needs  - Assess need for intravenous fluids  - Provide specific nutrition/hydration education as appropriate  - Include patient/family/caregiver in decisions related to nutrition  Outcome: Progressing

## 2023-09-04 NOTE — PLAN OF CARE
Problem: PAIN - ADULT  Goal: Verbalizes/displays adequate comfort level or baseline comfort level  Description: Interventions:  - Encourage patient to monitor pain and request assistance  - Assess pain using appropriate pain scale  - Administer analgesics based on type and severity of pain and evaluate response  - Implement non-pharmacological measures as appropriate and evaluate response  - Consider cultural and social influences on pain and pain management  - Notify physician/advanced practitioner if interventions unsuccessful or patient reports new pain  Outcome: Progressing     Problem: INFECTION - ADULT  Goal: Absence or prevention of progression during hospitalization  Description: INTERVENTIONS:  - Assess and monitor for signs and symptoms of infection  - Monitor lab/diagnostic results  - Monitor all insertion sites, i.e. indwelling lines, tubes, and drains  - Monitor endotracheal if appropriate and nasal secretions for changes in amount and color  - [place] appropriate cooling/warming therapies per order  - Administer medications as ordered  - Instruct and encourage patient and family to use good hand hygiene technique  - Identify and instruct in appropriate isolation precautions for identified infection/condition  Outcome: Progressing     Problem: SAFETY ADULT  Goal: Patient will remain free of falls  Description: INTERVENTIONS:  - Educate patient/family on patient safety including physical limitations  - Instruct patient to call for assistance with activity   - Consult OT/PT to assist with strengthening/mobility   - Keep Call bell within reach  - Keep bed low and locked with side rails adjusted as appropriate  - Keep care items and personal belongings within reach  - Initiate and maintain comfort rounds  - Make Fall Risk Sign visible to staff  - Offer Toileting every two  Hours, in advance of need  - Initiate/Maintain bed alarm  - Obtain necessary fall risk management equipment: non slip socks   - Apply yellow socks and bracelet for high fall risk patients  - Consider moving patient to room near nurses station  Outcome: Progressing     Problem: DISCHARGE PLANNING  Goal: Discharge to home or other facility with appropriate resources  Description: INTERVENTIONS:  - Identify barriers to discharge w/patient and caregiver  - Arrange for needed discharge resources and transportation as appropriate  - Identify discharge learning needs (meds, wound care, etc.)  - Arrange for interpretive services to assist at discharge as needed  - Refer to Case Management Department for coordinating discharge planning if the patient needs post-hospital services based on physician/advanced practitioner order or complex needs related to functional status, cognitive ability, or social support system  Outcome: Progressing     Problem: Knowledge Deficit  Goal: Patient/family/caregiver demonstrates understanding of disease process, treatment plan, medications, and discharge instructions  Description: Complete learning assessment and assess knowledge base.   Interventions:  - Provide teaching at level of understanding  - Provide teaching via preferred learning methods  Outcome: Progressing     Problem: GASTROINTESTINAL - ADULT  Goal: Minimal or absence of nausea and/or vomiting  Description: INTERVENTIONS:  - Administer IV fluids if ordered to ensure adequate hydration  - Administer ordered antiemetic medications as needed  - Provide nonpharmacologic comfort measures as appropriate  - Advance diet as tolerated, if ordered  - Consider nutrition services referral to assist patient with adequate nutrition and appropriate food choices  Outcome: Progressing  Goal: Maintains or returns to baseline bowel function  Description: INTERVENTIONS:  - Assess bowel function  - Encourage oral fluids to ensure adequate hydration  - Administer IV fluids if ordered to ensure adequate hydration  - Administer ordered medications as needed  - Encourage mobilization and activity  - Consider nutritional services referral to assist patient with adequate nutrition and appropriate food choices  Outcome: Progressing     Problem: METABOLIC, FLUID AND ELECTROLYTES - ADULT  Goal: Electrolytes maintained within normal limits  Description: INTERVENTIONS:  - Monitor labs and assess patient for signs and symptoms of electrolyte imbalances  - Administer electrolyte replacement as ordered  - Monitor response to electrolyte replacements, including repeat lab results as appropriate  - Instruct patient on fluid and nutrition as appropriate  Outcome: Progressing  Goal: Fluid balance maintained  Description: INTERVENTIONS:  - Monitor labs   - Monitor I/O and WT  - Instruct patient on fluid and nutrition as appropriate  - Assess for signs & symptoms of volume excess or deficit  Outcome: Progressing     Problem: Nutrition/Hydration-ADULT  Goal: Nutrient/Hydration intake appropriate for improving, restoring or maintaining nutritional needs  Description: Monitor and assess patient's nutrition/hydration status for malnutrition. Collaborate with interdisciplinary team and initiate plan and interventions as ordered. Monitor patient's weight and dietary intake as ordered or per policy. Utilize nutrition screening tool and intervene as necessary. Determine patient's food preferences and provide high-protein, high-caloric foods as appropriate.      INTERVENTIONS:  - Monitor oral intake, urinary output, labs, and treatment plans  - Assess nutrition and hydration status and recommend course of action  - Evaluate amount of meals eaten  - Assist patient with eating if necessary   - Allow adequate time for meals  - Recommend/ encourage appropriate diets, oral nutritional supplements, and vitamin/mineral supplements  - Order, calculate, and assess calorie counts as needed  - Recommend, monitor, and adjust tube feedings and TPN/PPN based on assessed needs  - Assess need for intravenous fluids  - Provide specific nutrition/hydration education as appropriate  - Include patient/family/caregiver in decisions related to nutrition  Outcome: Progressing

## 2023-09-04 NOTE — ASSESSMENT & PLAN NOTE
· C diff testing positive  · C/w Increased PO vanc 250 mg q6 (D5)  · Monitor stool output - still reporting high frequency & urgency of stools  · Advancement of diet to bland, low residue  · Banatrol as needed  · Low dose bentyl p.r.n for cramping  · Appreciate ongoing GI recs  · Now requesting [facility] once discharged -PT/OT order placed, d/w CM

## 2023-09-05 ENCOUNTER — APPOINTMENT (INPATIENT)
Dept: NON INVASIVE DIAGNOSTICS | Facility: HOSPITAL | Age: 83
DRG: 392 | End: 2023-09-05
Payer: MEDICARE

## 2023-09-05 LAB
GLUCOSE SERPL-MCNC: 116 MG/DL (ref 65–140)
GLUCOSE SERPL-MCNC: 98 MG/DL (ref 65–140)

## 2023-09-05 PROCEDURE — 93971 EXTREMITY STUDY: CPT | Performed by: SURGERY

## 2023-09-05 PROCEDURE — 82948 REAGENT STRIP/BLOOD GLUCOSE: CPT

## 2023-09-05 PROCEDURE — 97162 PT EVAL MOD COMPLEX 30 MIN: CPT

## 2023-09-05 PROCEDURE — 99232 SBSQ HOSP IP/OBS MODERATE 35: CPT

## 2023-09-05 PROCEDURE — 93971 EXTREMITY STUDY: CPT

## 2023-09-05 PROCEDURE — 99232 SBSQ HOSP IP/OBS MODERATE 35: CPT | Performed by: INTERNAL MEDICINE

## 2023-09-05 RX ADMIN — Medication 250 MG: at 08:45

## 2023-09-05 RX ADMIN — Medication 250 MG: at 17:40

## 2023-09-05 RX ADMIN — VANCOMYCIN HYDROCHLORIDE 250 MG: 250 CAPSULE ORAL at 05:30

## 2023-09-05 RX ADMIN — PRAVASTATIN SODIUM 80 MG: 40 TABLET ORAL at 15:45

## 2023-09-05 RX ADMIN — HEPARIN SODIUM 5000 UNITS: 5000 INJECTION INTRAVENOUS; SUBCUTANEOUS at 21:55

## 2023-09-05 RX ADMIN — ASPIRIN 81 MG: 81 TABLET, COATED ORAL at 08:46

## 2023-09-05 RX ADMIN — ALPRAZOLAM 0.25 MG: 0.25 TABLET ORAL at 21:55

## 2023-09-05 RX ADMIN — FIDAXOMICIN 200 MG: 200 TABLET, FILM COATED ORAL at 15:23

## 2023-09-05 RX ADMIN — LEVOTHYROXINE SODIUM 88 MCG: 88 TABLET ORAL at 05:30

## 2023-09-05 RX ADMIN — HEPARIN SODIUM 5000 UNITS: 5000 INJECTION INTRAVENOUS; SUBCUTANEOUS at 05:30

## 2023-09-05 RX ADMIN — FIDAXOMICIN 200 MG: 200 TABLET, FILM COATED ORAL at 21:55

## 2023-09-05 RX ADMIN — HEPARIN SODIUM 5000 UNITS: 5000 INJECTION INTRAVENOUS; SUBCUTANEOUS at 15:23

## 2023-09-05 NOTE — ASSESSMENT & PLAN NOTE
· C diff testing positive  · Discontinue vanco, initiate dificid as per GI recommendation  · Monitor stool output - still reporting high frequency & urgency of stools  · Advancement of diet to bland, low residue  · Banatrol as needed  · Low dose bentyl p.r.n for cramping  · Appreciate ongoing GI recs  · C once discharged

## 2023-09-05 NOTE — PLAN OF CARE
Problem: PAIN - ADULT  Goal: Verbalizes/displays adequate comfort level or baseline comfort level  Description: Interventions:  - Encourage patient to monitor pain and request assistance  - Assess pain using appropriate pain scale  - Administer analgesics based on type and severity of pain and evaluate response  - Implement non-pharmacological measures as appropriate and evaluate response  - Consider cultural and social influences on pain and pain management  - Notify physician/advanced practitioner if interventions unsuccessful or patient reports new pain  Outcome: Progressing     Problem: INFECTION - ADULT  Goal: Absence or prevention of progression during hospitalization  Description: INTERVENTIONS:  - Assess and monitor for signs and symptoms of infection  - Monitor lab/diagnostic results  - Monitor all insertion sites, i.e. indwelling lines, tubes, and drains  - Monitor endotracheal if appropriate and nasal secretions for changes in amount and color  - [place] appropriate cooling/warming therapies per order  - Administer medications as ordered  - Instruct and encourage patient and family to use good hand hygiene technique  - Identify and instruct in appropriate isolation precautions for identified infection/condition  Outcome: Progressing     Problem: SAFETY ADULT  Goal: Patient will remain free of falls  Description: INTERVENTIONS:  - Educate patient/family on patient safety including physical limitations  - Instruct patient to call for assistance with activity   - Consult OT/PT to assist with strengthening/mobility   - Keep Call bell within reach  - Keep bed low and locked with side rails adjusted as appropriate  - Keep care items and personal belongings within reach  - Initiate and maintain comfort rounds  - Make Fall Risk Sign visible to staff  - Offer Toileting every two  Hours, in advance of need  - Initiate/Maintain bed alarm  - Obtain necessary fall risk management equipment: non slip socks   - Apply yellow socks and bracelet for high fall risk patients  - Consider moving patient to room near nurses station  Outcome: Progressing     Problem: DISCHARGE PLANNING  Goal: Discharge to home or other facility with appropriate resources  Description: INTERVENTIONS:  - Identify barriers to discharge w/patient and caregiver  - Arrange for needed discharge resources and transportation as appropriate  - Identify discharge learning needs (meds, wound care, etc.)  - Arrange for interpretive services to assist at discharge as needed  - Refer to Case Management Department for coordinating discharge planning if the patient needs post-hospital services based on physician/advanced practitioner order or complex needs related to functional status, cognitive ability, or social support system  Outcome: Progressing     Problem: Knowledge Deficit  Goal: Patient/family/caregiver demonstrates understanding of disease process, treatment plan, medications, and discharge instructions  Description: Complete learning assessment and assess knowledge base.   Interventions:  - Provide teaching at level of understanding  - Provide teaching via preferred learning methods  Outcome: Progressing     Problem: GASTROINTESTINAL - ADULT  Goal: Minimal or absence of nausea and/or vomiting  Description: INTERVENTIONS:  - Administer IV fluids if ordered to ensure adequate hydration  - Administer ordered antiemetic medications as needed  - Provide nonpharmacologic comfort measures as appropriate  - Advance diet as tolerated, if ordered  - Consider nutrition services referral to assist patient with adequate nutrition and appropriate food choices  Outcome: Progressing  Goal: Maintains or returns to baseline bowel function  Description: INTERVENTIONS:  - Assess bowel function  - Encourage oral fluids to ensure adequate hydration  - Administer IV fluids if ordered to ensure adequate hydration  - Administer ordered medications as needed  - Encourage mobilization and activity  - Consider nutritional services referral to assist patient with adequate nutrition and appropriate food choices  Outcome: Progressing     Problem: METABOLIC, FLUID AND ELECTROLYTES - ADULT  Goal: Electrolytes maintained within normal limits  Description: INTERVENTIONS:  - Monitor labs and assess patient for signs and symptoms of electrolyte imbalances  - Administer electrolyte replacement as ordered  - Monitor response to electrolyte replacements, including repeat lab results as appropriate  - Instruct patient on fluid and nutrition as appropriate  Outcome: Progressing  Goal: Fluid balance maintained  Description: INTERVENTIONS:  - Monitor labs   - Monitor I/O and WT  - Instruct patient on fluid and nutrition as appropriate  - Assess for signs & symptoms of volume excess or deficit  Outcome: Progressing     Problem: Nutrition/Hydration-ADULT  Goal: Nutrient/Hydration intake appropriate for improving, restoring or maintaining nutritional needs  Description: Monitor and assess patient's nutrition/hydration status for malnutrition. Collaborate with interdisciplinary team and initiate plan and interventions as ordered. Monitor patient's weight and dietary intake as ordered or per policy. Utilize nutrition screening tool and intervene as necessary. Determine patient's food preferences and provide high-protein, high-caloric foods as appropriate.      INTERVENTIONS:  - Monitor oral intake, urinary output, labs, and treatment plans  - Assess nutrition and hydration status and recommend course of action  - Evaluate amount of meals eaten  - Assist patient with eating if necessary   - Allow adequate time for meals  - Recommend/ encourage appropriate diets, oral nutritional supplements, and vitamin/mineral supplements  - Order, calculate, and assess calorie counts as needed  - Recommend, monitor, and adjust tube feedings and TPN/PPN based on assessed needs  - Assess need for intravenous fluids  - Provide specific nutrition/hydration education as appropriate  - Include patient/family/caregiver in decisions related to nutrition  Outcome: Progressing

## 2023-09-05 NOTE — CASE MANAGEMENT
Case Management Discharge Planning Note    Patient name Deanna Queen  Location /129-50 MRN [id number]  : 1940 Date 2023       Current Admission Date: 2023  Current Admission Diagnosis:C. difficile colitis   Patient Active Problem List    Diagnosis Date Noted   • C. difficile colitis 2023   • Sigmoid diverticulitis 2023   • Other hyperlipidemia 2023   • Adrenal adenoma, left 2023   • Diverticulitis 2023   • Type 2 diabetes mellitus with hypoglycemia without coma, without long-term current use of insulin ([address]) 2023   • H/O TIA (transient ischemic attack) and stroke 2023   • KAYLA (acute kidney injury) ([address]) 2023   • Hypothyroidism 2023   • Bradycardia 2023      LOS (days): 10  Geometric Mean LOS (GMLOS) (days): 2.60  Days to GMLOS:-7.4     OBJECTIVE:  Risk of Unplanned Readmission Score: 13.1         Current admission status: Inpatient   Preferred Pharmacy:   [address] [place] [address]  Phone: [phone] Fax: [phone]    [facility]  [address] [place] [address]  Phone: [phone] Fax: [phone]    Primary Care Provider: No primary care provider on file. Primary Insurance: MEDICARE  Secondary Insurance: [employer]    DISCHARGE DETAILS:  [facility]) accepted pt. Phone [phone] and will fax AVS to [phone] on dc.

## 2023-09-05 NOTE — OCCUPATIONAL THERAPY NOTE
Occupational Therapy         Patient Name: Xuan Garrison  Today's Date: 9/5/2023 09/05/23 1303   OT Last Visit   OT Visit Date 09/05/23   Note Type   Note type Screen   Cancel Reasons Other   Additional Comments pt evaluated on 8-26 during this admission; pt evaluated this date by PT and performing at (I) level and no OT needs at this time and will complete OT orders       [place]

## 2023-09-05 NOTE — PROGRESS NOTES
[address]  Progress Note  Name: Tonia James  MRN: [id number]  Unit/Bed#: 221-79 I Date of Admission: 8/25/2023   Date of Service: 9/5/2023 I Hospital Day: 10    Assessment/Plan   * C. difficile colitis  Assessment & Plan  · C diff testing positive  · Discontinue vanco, initiate dificid as per GI recommendation  · Monitor stool output - still reporting high frequency & urgency of stools  · Advancement of diet to bland, low residue  · Banatrol as needed  · Low dose bentyl p.r.n for cramping  · Appreciate ongoing GI recs  · [facility] once discharged      Sigmoid diverticulitis  Assessment & Plan  · Presented to the emergency room with complaints of abdominal pain. Associated with fever, nausea, and non-bloody, mucus loose stools  · Patient was admitted from 8/13-8/16 for jejunal diverticulitis. Was being treated with PO Augmentin as outpatient  · CT abd/pelvis: Interval increased diffuse sigmoid wall thickening with adjacent inflammatory change, most concerning for acute diverticulitis. No fluid collections. 2. Improving inflamed jejunal diverticulum.   · Remains afebrile, wo leukocytosis  · Diet was advanced to bland, low residue   · Zosyn discontinued  · Outpatient general surgery evaluation - note the patient has refused surgery for the recurrent diverticulitis in the past.  · Appreciate GI consult      Other hyperlipidemia  Assessment & Plan  · Continue on simvastatin 40 mg p.o. at bedtime  · Will give formulary alternative pravastatin 80 mg p.o. at bedtime  · PCP follow-up post discharge     Adrenal adenoma, left  Assessment & Plan  · Outpatient work-up and surveillance imaging with PCP    Hypothyroidism  Assessment & Plan  · Check a TSH level, TSH 3.247 (08/28/2023)  · Continue levothyroxine    Type 2 diabetes mellitus with hypoglycemia without coma, without long-term current use of insulin (HCC)  Assessment & Plan  Lab Results   Component Value Date    HGBA1C 6.0 (H) 08/28/2023       (P) [id number]     · Diet-controlled  · ISS with blood glucose monitoring               VTE Pharmacologic Prophylaxis: VTE Score: 5 heparin    Patient Centered Rounds: I performed bedside rounds with nursing staff today. Discussions with Specialists or Other Care Team Provider:     Education and Discussions with Family / Patient: Updated  (son and daughter) via phone. Total Time Spent on Date of Encounter in care of patient: 35 minutes This time was spent on one or more of the following: performing physical exam; counseling and coordination of care; obtaining or reviewing history; documenting in the medical record; reviewing/ordering tests, medications or procedures; communicating with other healthcare professionals and discussing with patient's family/caregivers. Current Length of Stay: 10 day(s)  Current Patient Status: Inpatient   Certification Statement: The patient will continue to require additional inpatient hospital stay due to continuing C diff treatment  Discharge Plan: Anticipate discharge in 24-48 hrs to home with home services. Code Status: Level 1 - Full Code    Subjective:   Patient still reports ongoing diarrhea, about 2-3 times an hour. Notes that her stool is soft but improving. Still has a lot of pressure in rectum. Denies cramping, nausea, vomiting, SOB, chest pain, headache. Objective:     Vitals:   Temp (24hrs), Av.3 °F (36.8 °C), Min:98.1 °F (36.7 °C), Max:98.4 °F (36.9 °C)    Temp:  [98.1 °F (36.7 °C)-98.4 °F (36.9 °C)] 98.4 °F (36.9 °C)  HR:  [58-63] 63  Resp:  [17] 17  BP: (138-139)/(74-75) 139/75  SpO2:  [96 %] 96 %  Body mass index is 30.34 kg/m². Input and Output Summary (last 24 hours): Intake/Output Summary (Last 24 hours) at 2023 1233  Last data filed at 2023 1814  Gross per 24 hour   Intake 360 ml   Output --   Net 360 ml       Physical Exam:   Physical Exam  HENT:      Head: Normocephalic and atraumatic.    Cardiovascular: Rate and Rhythm: Normal rate and regular rhythm. Pulses: Normal pulses. Heart sounds: Normal heart sounds. Pulmonary:      Effort: Pulmonary effort is normal. No respiratory distress. Breath sounds: Normal breath sounds. Abdominal:      General: Abdomen is flat. There is no distension. Palpations: Abdomen is soft. Tenderness: There is no abdominal tenderness. Comments: Hypoactive bowel sounds   Musculoskeletal:      Right lower leg: No edema. Left lower leg: No edema. Skin:     General: Skin is warm and dry. Neurological:      General: No focal deficit present. Mental Status: She is alert and oriented to person, place, and time. Psychiatric:         Mood and Affect: Mood normal.         Behavior: Behavior normal.          Additional Data:     Labs:  Results from last 7 days   Lab Units 09/01/23  0440   WBC Thousand/uL 5.08   HEMOGLOBIN g/dL 11.0*   HEMATOCRIT % 33.9*   PLATELETS Thousands/uL 273   NEUTROS PCT % 54   LYMPHS PCT % 25   MONOS PCT % 15*   EOS PCT % 4     Results from last 7 days   Lab Units 09/03/23  0534   SODIUM mmol/L 144   POTASSIUM mmol/L 3.6   CHLORIDE mmol/L 109*   CO2 mmol/L 29   BUN mg/dL 5   CREATININE mg/dL 0.72   ANION GAP mmol/L 6   CALCIUM mg/dL 8.5   GLUCOSE RANDOM mg/dL 84         Results from last 7 days   Lab Units 09/04/23  2123 09/04/23  1600 09/04/23  1112 09/04/23  0713 09/03/23  2050 09/03/23  1519 09/03/23  1055 09/03/23  0730 09/02/23  2127 09/02/23  1547 09/02/23  1112 09/02/23  0736   POC GLUCOSE mg/dl 86 93 92 93 118 133 141* 81 121 144* 105 91               Lines/Drains:  Invasive Devices     None                       Imaging: No pertinent imaging reviewed.     Recent Cultures (last 7 days):         Last 24 Hours Medication List:   Current Facility-Administered Medications   Medication Dose Route Frequency Provider Last Rate   • acetaminophen  650 mg Oral Q6H PRN Shaun Calvillo PA-C     • ALPRAZolam  0.25 mg Oral BID PRN Trudy Duckworth MD [place]     • aluminum-magnesium hydroxide-simethicone  30 mL Oral Q4H PRN Kami Garcia PA-C     • aspirin  81 mg Oral Daily Shaun Calvillo PA-C     • dextrose  25 mL Intravenous Daily PRN Anthony Aldana DO     • dicyclomine  10 mg Oral 4x Daily PRN Jevon Brown MD     • fidaxomicin  200 mg Oral Q12H [facility] Yoselin oJrdan PA-C     • heparin (porcine)  5,000 Units Subcutaneous Q8H [facility] Shaun Calvillo PA-C     • levothyroxine  88 mcg Oral Early Morning Shaun Calvillo PA-C     • nicotine  1 patch Transdermal Daily Shaun Calvillo PA-C     • pravastatin  80 mg Oral Daily With Dinner Shaun Calvillo PA-C     • saccharomyces boulardii  250 mg Oral BID Yoselin Jordan PA-C     • trimethobenzamide  200 mg Intramuscular Q6H PRN Kami Garcia PA-C          Today, Patient Was Seen By: Emilie Moreno PA-C    **Please Note: This note may have been constructed using a voice recognition system. **

## 2023-09-05 NOTE — PLAN OF CARE
Problem: PAIN - ADULT  Goal: Verbalizes/displays adequate comfort level or baseline comfort level  Description: Interventions:  - Encourage patient to monitor pain and request assistance  - Assess pain using appropriate pain scale  - Administer analgesics based on type and severity of pain and evaluate response  - Implement non-pharmacological measures as appropriate and evaluate response  - Consider cultural and social influences on pain and pain management  - Notify physician/advanced practitioner if interventions unsuccessful or patient reports new pain  Outcome: Progressing     Problem: INFECTION - ADULT  Goal: Absence or prevention of progression during hospitalization  Description: INTERVENTIONS:  - Assess and monitor for signs and symptoms of infection  - Monitor lab/diagnostic results  - Monitor all insertion sites, i.e. indwelling lines, tubes, and drains  - Monitor endotracheal if appropriate and nasal secretions for changes in amount and color  - [place] appropriate cooling/warming therapies per order  - Administer medications as ordered  - Instruct and encourage patient and family to use good hand hygiene technique  - Identify and instruct in appropriate isolation precautions for identified infection/condition  Outcome: Progressing     Problem: SAFETY ADULT  Goal: Patient will remain free of falls  Description: INTERVENTIONS:  - Educate patient/family on patient safety including physical limitations  - Instruct patient to call for assistance with activity   - Consult OT/PT to assist with strengthening/mobility   - Keep Call bell within reach  - Keep bed low and locked with side rails adjusted as appropriate  - Keep care items and personal belongings within reach  - Initiate and maintain comfort rounds  - Make Fall Risk Sign visible to staff  - Offer Toileting every two  Hours, in advance of need  - Initiate/Maintain bed alarm  - Obtain necessary fall risk management equipment: non slip socks   - Apply yellow socks and bracelet for high fall risk patients  - Consider moving patient to room near nurses station  Outcome: Progressing     Problem: DISCHARGE PLANNING  Goal: Discharge to home or other facility with appropriate resources  Description: INTERVENTIONS:  - Identify barriers to discharge w/patient and caregiver  - Arrange for needed discharge resources and transportation as appropriate  - Identify discharge learning needs (meds, wound care, etc.)  - Arrange for interpretive services to assist at discharge as needed  - Refer to Case Management Department for coordinating discharge planning if the patient needs post-hospital services based on physician/advanced practitioner order or complex needs related to functional status, cognitive ability, or social support system  Outcome: Progressing     Problem: Knowledge Deficit  Goal: Patient/family/caregiver demonstrates understanding of disease process, treatment plan, medications, and discharge instructions  Description: Complete learning assessment and assess knowledge base.   Interventions:  - Provide teaching at level of understanding  - Provide teaching via preferred learning methods  Outcome: Progressing     Problem: GASTROINTESTINAL - ADULT  Goal: Minimal or absence of nausea and/or vomiting  Description: INTERVENTIONS:  - Administer IV fluids if ordered to ensure adequate hydration  - Administer ordered antiemetic medications as needed  - Provide nonpharmacologic comfort measures as appropriate  - Advance diet as tolerated, if ordered  - Consider nutrition services referral to assist patient with adequate nutrition and appropriate food choices  Outcome: Progressing  Goal: Maintains or returns to baseline bowel function  Description: INTERVENTIONS:  - Assess bowel function  - Encourage oral fluids to ensure adequate hydration  - Administer IV fluids if ordered to ensure adequate hydration  - Administer ordered medications as needed  - Encourage mobilization and activity  - Consider nutritional services referral to assist patient with adequate nutrition and appropriate food choices  Outcome: Progressing     Problem: METABOLIC, FLUID AND ELECTROLYTES - ADULT  Goal: Electrolytes maintained within normal limits  Description: INTERVENTIONS:  - Monitor labs and assess patient for signs and symptoms of electrolyte imbalances  - Administer electrolyte replacement as ordered  - Monitor response to electrolyte replacements, including repeat lab results as appropriate  - Instruct patient on fluid and nutrition as appropriate  Outcome: Progressing  Goal: Fluid balance maintained  Description: INTERVENTIONS:  - Monitor labs   - Monitor I/O and WT  - Instruct patient on fluid and nutrition as appropriate  - Assess for signs & symptoms of volume excess or deficit  Outcome: Progressing     Problem: Nutrition/Hydration-ADULT  Goal: Nutrient/Hydration intake appropriate for improving, restoring or maintaining nutritional needs  Description: Monitor and assess patient's nutrition/hydration status for malnutrition. Collaborate with interdisciplinary team and initiate plan and interventions as ordered. Monitor patient's weight and dietary intake as ordered or per policy. Utilize nutrition screening tool and intervene as necessary. Determine patient's food preferences and provide high-protein, high-caloric foods as appropriate.      INTERVENTIONS:  - Monitor oral intake, urinary output, labs, and treatment plans  - Assess nutrition and hydration status and recommend course of action  - Evaluate amount of meals eaten  - Assist patient with eating if necessary   - Allow adequate time for meals  - Recommend/ encourage appropriate diets, oral nutritional supplements, and vitamin/mineral supplements  - Order, calculate, and assess calorie counts as needed  - Recommend, monitor, and adjust tube feedings and TPN/PPN based on assessed needs  - Assess need for intravenous fluids  - Provide specific nutrition/hydration education as appropriate  - Include patient/family/caregiver in decisions related to nutrition  Outcome: Progressing

## 2023-09-05 NOTE — PROGRESS NOTES
Progress Note- Rhunette Aschoff 79 y.o. female MRN: [id number]    Unit/Bed#: 410-01 Encounter: [id number]      Assessment and Plan:     Patient is a 79 y.o. female with a history of hypertension, DM 2, hypothyroidism, and HLD who was readmitted on August 25, 2023 from a previous admission for diverticulitis and antibiotics, with worsening diarrhea and was previously on Flagyl which she could not tolerate and then was discharged on Augmentin. Upon re-admission imaging showed diffuse sigmoid wall thickening with adjacent laboratory changes most concerning for possible acute diverticulitis, with positive C. difficile test and was started on oral vancomycin. She has been on the increased dose of vacomycin since 8/31/23, with some slow and steady improvements in her overall symptoms. Serology: (9/5/23) H/H 11.0/33.9,  and the rest of her blood work was relatively normal.     Exam: Pt was OOB in the bathroom without assistance and then was able to lay back down in bed to be examined. She was A&O x 3, pleasant and cooperative. Abdomen is soft, non-tender with hypoactive BS x 4. Skin is non-icteric. Trace edema noted of the b/l lower extremities upon exam today. Nausea  Resolved  Continue Lo Fiber/Residue diet and advance diet slowly as tolerated. Continue Banatrol TID to help provide stool bulk.      Diverticulosis with a history of recurrent diverticulitis  Resolving     C. difficile infection  Tenesmus  Diarrhea  Continue oral vancomycin to complete the 14 day cycle. (11:43: After a discussion with Dr. Marian Gao, and ZOIE, we will discontinue the Vancomycin and start her on Dificid 200 mg BID for 10 days)     Any future use of antibiotics will require prophylactic vancomycin.      We will follow up as an outpatient to discuss symptoms and possibility of an outpatient colonoscopy in 6-8 weeks as well as a possible consultation to Dr Delia Alcazar regarding possible fecal transplant in the future if symptoms persist and do not resolve. There is already a task message to office staff to reach out and get her scheduled as discussed. GI will continue to follow. __________________________________________________________________    Subjective:     Patient is a 79 y.o. female who is currently being treated for diverticulosis with a history of recurrent diverticulitis, C. difficile, tenesmus, and nausea. She reports complete resolution of the nausea and the abdominal pain is more of a discomfort rather than pain. She is tolerating a regular diet well. She continues with at least 2 loose stools every hour or so, but that they are starting to become more formed without any blood or melena. She is hoping to be able to go home today.      Medication Administration - last 24 hours from 09/04/2023 0943 to 09/05/2023 0943       Date/Time Order Dose Route Action Action by     09/05/2023 0846 EDT aspirin (ECOTRIN LOW STRENGTH) EC tablet 81 mg 81 mg Oral Given Celso Watson LPN     50/99/1447 3387 EDT levothyroxine tablet 88 mcg 88 mcg Oral Given Sukhjinder Newsome     09/04/2023 1759 EDT pravastatin (PRAVACHOL) tablet 80 mg 80 mg Oral Given Kendal Dukes RN     09/05/2023 8722 EDT nicotine (NICODERM CQ) 7 mg/24hr TD 24 hr patch 1 patch 1 patch Transdermal Not Given Celso Watson LPN     88/30/7375 0771 EDT heparin (porcine) subcutaneous injection 5,000 Units 5,000 Units Subcutaneous Given Sukhjinder Newsome     09/04/2023 2303 EDT heparin (porcine) subcutaneous injection 5,000 Units 5,000 Units Subcutaneous Given Sukhjinder Newsome     09/04/2023 1332 EDT heparin (porcine) subcutaneous injection 5,000 Units 5,000 Units Subcutaneous Given Kendal Dukes RN     09/04/2023 2303 EDT ALPRAZolam Candance Sousa) tablet 0.25 mg 0.25 mg Oral Given Sukhjinder Newsome     09/05/2023 0845 EDT saccharomyces boulardii (FLORASTOR) capsule 250 mg 250 mg Oral Given Celso Watson LPN     72/21/3963 4078 EDT saccharomyces boulardii (FLORASTOR) capsule 250 mg 250 mg Oral Given Jeremy Moss RN     09/05/2023 0530 EDT vancomycin (VANCOCIN) capsule 250 mg 250 mg Oral Given Misadelmar Paul     09/04/2023 2303 EDT vancomycin (VANCOCIN) capsule 250 mg 250 mg Oral Given Misa Beverly     09/04/2023 1759 EDT vancomycin (VANCOCIN) capsule 250 mg 250 mg Oral Given Jeremy Moss RN     09/04/2023 1132 EDT vancomycin (VANCOCIN) capsule 250 mg 250 mg Oral Given Jeremy Moss RN          Objective:     Vitals: Blood pressure 139/75, pulse 63, temperature 98.4 °F (36.9 °C), temperature source Oral, resp. rate 17, height 5' 3" (1.6 m), weight 77.7 kg (171 lb 4.8 oz), SpO2 96 %. ,Body mass index is 30.34 kg/m². Intake/Output Summary (Last 24 hours) at 9/5/2023 0943  Last data filed at 9/4/2023 1814  Gross per 24 hour   Intake 360 ml   Output --   Net 360 ml       Invasive Devices     None                 Lab Results:  No results displayed because visit has over 200 results. Imaging Studies: I have personally reviewed pertinent imaging studies.

## 2023-09-05 NOTE — ASSESSMENT & PLAN NOTE
Lab Results   Component Value Date    HGBA1C 6.0 (H) 08/28/2023       (P) [id number]     · Diet-controlled  · ISS with blood glucose monitoring

## 2023-09-05 NOTE — PHYSICAL THERAPY NOTE
Physical Therapy Evaluation    Patient Name: Taina Spann    [id number] Date: 9/5/2023     Problem List  Principal Problem:    C. difficile colitis  Active Problems:    Type 2 diabetes mellitus with hypoglycemia without coma, without long-term current use of insulin (HCC)    Hypothyroidism    Adrenal adenoma, left    Sigmoid diverticulitis    Other hyperlipidemia       Past Medical History  Past Medical History:   Diagnosis Date    Diverticulitis     Hypertension     TIA (transient ischemic attack)         Past Surgical History  Past Surgical History:   Procedure Laterality Date    COLECTOMY             09/05/23 1301   PT Last Visit   PT Visit Date 09/05/23   Note Type   Note type Evaluation   Pain Assessment   Pain Assessment Tool 0-10   Pain Score No Pain   Restrictions/Precautions   Weight Bearing Precautions Per Order No   Home Living   Type of [address] [place] [address] [address] Two level;Performs ADLs on one level; Able to live on main level with bedroom/bathroom;Stairs to enter without rails  (1 LILIAN)   Bathroom Shower/Tub Tub/shower unit   [employer] Raised   Bathroom Equipment Grab bars in [address] Walker;Cane;Wheelchair-manual   Additional Comments pt denies use of DME at baseline   Prior Function   Level of [place] Independent with ADLs; Independent with functional mobility; Independent with IADLS   Lives With Alone   IADLs Independent with driving   Falls in the last 6 months 0   Vocational Retired   General   Family/Caregiver Present No   Cognition   Overall Cognitive Status WFL   Arousal/Participation Alert   Attention Within functional limits   Orientation Level Oriented X4   Following Commands Follows all commands and directions without difficulty   Subjective   Subjective "Everything's been downhill"   RLE Assessment   RLE Assessment WFL   LLE Assessment   LLE Assessment WFL   Bed Mobility   Supine to Sit 7  Independent   Sit to Supine   (pt seated EOB at end of session)   Transfers   Sit to Stand 7  Independent   Stand to Sit 7  Independent   Toilet transfer 7  Independent   Additional Comments no device used   Ambulation/Elevation   Gait pattern Improper Weight shift   Gait Assistance 7  Independent   Assistive Device None   Distance 200'   Balance   Static Sitting Good   Dynamic Sitting Good   Static Standing Good   Dynamic Standing Good   Ambulatory Good   Endurance Deficit   Endurance Deficit No   Activity Tolerance   Activity Tolerance Patient tolerated treatment well   Assessment   Prognosis Good   Assessment Patient is a 79 y.o. female evaluated by Physical Therapy s/p admit to [address] [place] [address] on 8/25/2023 with admitting diagnosis of: Hypomagnesemia, Abdominal pain, Diverticulitis of sigmoid colon, and principal problem of: C. difficile colitis. PT was consulted to assess patient's functional mobility and discharge needs. Ordered are PT Evaluation and treatment. Comorbidities affecting patient's physical performance at time of assessment include: hx of TIA, diverticulitis, HTN. Personal factors affecting the patient at time of IE include: step(s) to enter home. Please locate objective findings from PT assessment regarding body systems outlined above. Upon evaluation, pt able to perform all functional mobility independently without assistive device. Pt demonstrating increased lateral postural sway with ambulation, however balance WFL. Seated rest break taken following 200'; WFL strength, endurance, and coordination demonstrated. Continued PT intervention not indicated at this time; will d/c PT orders. The patient's AM-PAC Basic Mobility Inpatient Short Form Raw Score is 24. A Raw score of greater than 16 suggests the patient may benefit from discharge to home. Please also refer to the recommendation of the Physical Therapist for safe discharge planning.    Goals   Patient Goals to go home   Plan PT Frequency Other (Comment)  (eval only)   Recommendation   PT Discharge Recommendation No rehabilitation needs   AM-PAC Basic Mobility Inpatient   Turning in Flat Bed Without Bedrails 4   Lying on Back to Sitting on Edge of Flat Bed Without Bedrails 4   Moving Bed to Chair 4   Standing Up From Chair Using Arms 4   Walk in Room 4   Climb 3-5 Stairs With Railing 4   Basic Mobility Inpatient Raw Score 24   Basic Mobility Standardized Score 57.68   Highest Level Of Mobility   JH-HLM Goal 8: Walk 250 feet or more   JH-HLM Achieved 8: Walk 250 feet ot more   End of Consult   Patient Position at End of Consult Seated edge of bed; All needs within reach

## 2023-09-05 NOTE — CASE MANAGEMENT
Case Management Discharge Planning Note    Patient name Janine Chavez /[id number] MRN [id number]  : 1940 Date 2023       Current Admission Date: 2023  Current Admission Diagnosis:C. difficile colitis   Patient Active Problem List    Diagnosis Date Noted   • C. difficile colitis 2023   • Sigmoid diverticulitis 2023   • Other hyperlipidemia 2023   • Adrenal adenoma, left 2023   • Diverticulitis 2023   • Type 2 diabetes mellitus with hypoglycemia without coma, without long-term current use of insulin ([address]) 2023   • H/O TIA (transient ischemic attack) and stroke 2023   • KAYLA (acute kidney injury) ([address]) 2023   • Hypothyroidism 2023   • Bradycardia 2023      LOS (days): 10  Geometric Mean LOS (GMLOS) (days): 2.60  Days to GMLOS:-7.3     OBJECTIVE:  Risk of Unplanned Readmission Score: 13.06         Current admission status: Inpatient   Preferred Pharmacy:   [address] [place] [address]  Phone: [phone] Fax: [phone]    [facility]  [place] [address]  Phone: [phone] Fax: [phone]    Primary Care Provider: No primary care provider on file.     Primary Insurance: MEDICARE  Secondary Insurance: AETNA    DISCHARGE DETAILS:    Discharge planning discussed with[de-identified] patient  Freedom of Choice: Yes  Comments - Freedom of Choice: referral Sammie FORMAN

## 2023-09-06 VITALS
OXYGEN SATURATION: 95 % | BODY MASS INDEX: 30.35 KG/M2 | RESPIRATION RATE: 16 BRPM | WEIGHT: 171.3 LBS | HEART RATE: 68 BPM | TEMPERATURE: 100 F | DIASTOLIC BLOOD PRESSURE: 73 MMHG | HEIGHT: 63 IN | SYSTOLIC BLOOD PRESSURE: 142 MMHG

## 2023-09-06 LAB
GLUCOSE SERPL-MCNC: 103 MG/DL (ref 65–140)
GLUCOSE SERPL-MCNC: 79 MG/DL (ref 65–140)
GLUCOSE SERPL-MCNC: 83 MG/DL (ref 65–140)
GLUCOSE SERPL-MCNC: 91 MG/DL (ref 65–140)

## 2023-09-06 PROCEDURE — 99232 SBSQ HOSP IP/OBS MODERATE 35: CPT | Performed by: PHYSICIAN ASSISTANT

## 2023-09-06 PROCEDURE — 99232 SBSQ HOSP IP/OBS MODERATE 35: CPT | Performed by: INTERNAL MEDICINE

## 2023-09-06 PROCEDURE — 82948 REAGENT STRIP/BLOOD GLUCOSE: CPT

## 2023-09-06 PROCEDURE — 99239 HOSP IP/OBS DSCHRG MGMT >30: CPT | Performed by: PHYSICIAN ASSISTANT

## 2023-09-06 RX ORDER — SACCHAROMYCES BOULARDII 250 MG
250 CAPSULE ORAL 2 TIMES DAILY
Qty: 60 CAPSULE | Refills: 0 | Status: SHIPPED | OUTPATIENT
Start: 2023-09-06

## 2023-09-06 RX ORDER — DICYCLOMINE HYDROCHLORIDE 10 MG/1
10 CAPSULE ORAL
Status: DISCONTINUED | OUTPATIENT
Start: 2023-09-06 | End: 2023-09-06 | Stop reason: HOSPADM

## 2023-09-06 RX ORDER — DICYCLOMINE HYDROCHLORIDE 10 MG/1
10 CAPSULE ORAL 3 TIMES DAILY PRN
Qty: 30 CAPSULE | Refills: 0 | Status: SHIPPED | OUTPATIENT
Start: 2023-09-06

## 2023-09-06 RX ADMIN — DICYCLOMINE HYDROCHLORIDE 10 MG: 10 CAPSULE ORAL at 13:16

## 2023-09-06 RX ADMIN — HEPARIN SODIUM 5000 UNITS: 5000 INJECTION INTRAVENOUS; SUBCUTANEOUS at 05:45

## 2023-09-06 RX ADMIN — LEVOTHYROXINE SODIUM 88 MCG: 88 TABLET ORAL at 05:45

## 2023-09-06 RX ADMIN — Medication 250 MG: at 09:03

## 2023-09-06 RX ADMIN — FIDAXOMICIN 200 MG: 200 TABLET, FILM COATED ORAL at 09:03

## 2023-09-06 RX ADMIN — HEPARIN SODIUM 5000 UNITS: 5000 INJECTION INTRAVENOUS; SUBCUTANEOUS at 13:16

## 2023-09-06 RX ADMIN — ASPIRIN 81 MG: 81 TABLET, COATED ORAL at 09:03

## 2023-09-06 NOTE — PLAN OF CARE
Problem: PAIN - ADULT  Goal: Verbalizes/displays adequate comfort level or baseline comfort level  Description: Interventions:  - Encourage patient to monitor pain and request assistance  - Assess pain using appropriate pain scale  - Administer analgesics based on type and severity of pain and evaluate response  - Implement non-pharmacological measures as appropriate and evaluate response  - Consider cultural and social influences on pain and pain management  - Notify physician/advanced practitioner if interventions unsuccessful or patient reports new pain  9/6/2023 1513 by Rajwinder Roman LPN  Outcome: Adequate for Discharge  9/6/2023 1021 by Rajwinder Roman LPN  Outcome: Progressing     Problem: INFECTION - ADULT  Goal: Absence or prevention of progression during hospitalization  Description: INTERVENTIONS:  - Assess and monitor for signs and symptoms of infection  - Monitor lab/diagnostic results  - Monitor all insertion sites, i.e. indwelling lines, tubes, and drains  - Monitor endotracheal if appropriate and nasal secretions for changes in amount and color  - [place] appropriate cooling/warming therapies per order  - Administer medications as ordered  - Instruct and encourage patient and family to use good hand hygiene technique  - Identify and instruct in appropriate isolation precautions for identified infection/condition  9/6/2023 1513 by Rajwinder Roman LPN  Outcome: Adequate for Discharge  9/6/2023 1021 by Rajwinder Roman LPN  Outcome: Progressing     Problem: SAFETY ADULT  Goal: Patient will remain free of falls  Description: INTERVENTIONS:  - Educate patient/family on patient safety including physical limitations  - Instruct patient to call for assistance with activity   - Consult OT/PT to assist with strengthening/mobility   - Keep Call bell within reach  - Keep bed low and locked with side rails adjusted as appropriate  - Keep care items and personal belongings within reach  - Initiate and maintain comfort rounds  - Make Fall Risk Sign visible to staff  - Offer Toileting every two  Hours, in advance of need  - Initiate/Maintain bed alarm  - Obtain necessary fall risk management equipment: non slip socks   - Apply yellow socks and bracelet for high fall risk patients  - Consider moving patient to room near nurses station  9/6/2023 1513 by Eileen Strauss LPN  Outcome: Adequate for Discharge  9/6/2023 1021 by Eileen Strauss LPN  Outcome: Progressing     Problem: DISCHARGE PLANNING  Goal: Discharge to home or other facility with appropriate resources  Description: INTERVENTIONS:  - Identify barriers to discharge w/patient and caregiver  - Arrange for needed discharge resources and transportation as appropriate  - Identify discharge learning needs (meds, wound care, etc.)  - Arrange for interpretive services to assist at discharge as needed  - Refer to Case Management Department for coordinating discharge planning if the patient needs post-hospital services based on physician/advanced practitioner order or complex needs related to functional status, cognitive ability, or social support system  9/6/2023 1513 by Eileen Strauss LPN  Outcome: Adequate for Discharge  9/6/2023 1021 by Eileen Strauss LPN  Outcome: Progressing     Problem: Knowledge Deficit  Goal: Patient/family/caregiver demonstrates understanding of disease process, treatment plan, medications, and discharge instructions  Description: Complete learning assessment and assess knowledge base.   Interventions:  - Provide teaching at level of understanding  - Provide teaching via preferred learning methods  9/6/2023 1513 by Eileen Strauss LPN  Outcome: Adequate for Discharge  9/6/2023 1021 by Eileen Strauss LPN  Outcome: Progressing     Problem: GASTROINTESTINAL - ADULT  Goal: Minimal or absence of nausea and/or vomiting  Description: INTERVENTIONS:  - Administer IV fluids if ordered to ensure adequate hydration  - Administer ordered antiemetic medications as needed  - Provide nonpharmacologic comfort measures as appropriate  - Advance diet as tolerated, if ordered  - Consider nutrition services referral to assist patient with adequate nutrition and appropriate food choices  9/6/2023 1513 by Alfredito Wisdom LPN  Outcome: Adequate for Discharge  9/6/2023 1021 by Alfredito Wisdom LPN  Outcome: Progressing  Goal: Maintains or returns to baseline bowel function  Description: INTERVENTIONS:  - Assess bowel function  - Encourage oral fluids to ensure adequate hydration  - Administer IV fluids if ordered to ensure adequate hydration  - Administer ordered medications as needed  - Encourage mobilization and activity  - Consider nutritional services referral to assist patient with adequate nutrition and appropriate food choices  9/6/2023 1513 by Alfredito Wisdom LPN  Outcome: Adequate for Discharge  9/6/2023 1021 by Alfredito Wisdom LPN  Outcome: Progressing     Problem: METABOLIC, FLUID AND ELECTROLYTES - ADULT  Goal: Electrolytes maintained within normal limits  Description: INTERVENTIONS:  - Monitor labs and assess patient for signs and symptoms of electrolyte imbalances  - Administer electrolyte replacement as ordered  - Monitor response to electrolyte replacements, including repeat lab results as appropriate  - Instruct patient on fluid and nutrition as appropriate  9/6/2023 1513 by Alfredito Wisdom LPN  Outcome: Adequate for Discharge  9/6/2023 1021 by Alfredito Wisdom LPN  Outcome: Progressing  Goal: Fluid balance maintained  Description: INTERVENTIONS:  - Monitor labs   - Monitor I/O and WT  - Instruct patient on fluid and nutrition as appropriate  - Assess for signs & symptoms of volume excess or deficit  9/6/2023 1513 by Alfredito Wisdom LPN  Outcome: Adequate for Discharge  9/6/2023 1021 by Alfredito Wisdom LPN  Outcome: Progressing     Problem: Nutrition/Hydration-ADULT  Goal: Nutrient/Hydration intake appropriate for improving, restoring or maintaining nutritional needs  Description: Monitor and assess patient's nutrition/hydration status for malnutrition. Collaborate with interdisciplinary team and initiate plan and interventions as ordered. Monitor patient's weight and dietary intake as ordered or per policy. Utilize nutrition screening tool and intervene as necessary. Determine patient's food preferences and provide high-protein, high-caloric foods as appropriate.      INTERVENTIONS:  - Monitor oral intake, urinary output, labs, and treatment plans  - Assess nutrition and hydration status and recommend course of action  - Evaluate amount of meals eaten  - Assist patient with eating if necessary   - Allow adequate time for meals  - Recommend/ encourage appropriate diets, oral nutritional supplements, and vitamin/mineral supplements  - Order, calculate, and assess calorie counts as needed  - Recommend, monitor, and adjust tube feedings and TPN/PPN based on assessed needs  - Assess need for intravenous fluids  - Provide specific nutrition/hydration education as appropriate  - Include patient/family/caregiver in decisions related to nutrition  9/6/2023 1513 by Etienne Huffman LPN  Outcome: Adequate for Discharge  9/6/2023 1021 by Etienne Huffman LPN  Outcome: Progressing

## 2023-09-06 NOTE — CASE MANAGEMENT
Case Management Discharge Planning Note    Patient name Kylee Graham  Location /[id number] MRN [id number]  : 1940 Date 2023       Current Admission Date: 2023  Current Admission Diagnosis:C. difficile colitis   Patient Active Problem List    Diagnosis Date Noted   • C. difficile colitis 2023   • Sigmoid diverticulitis 2023   • Other hyperlipidemia 2023   • Adrenal adenoma, left 2023   • Diverticulitis 2023   • Type 2 diabetes mellitus with hypoglycemia without coma, without long-term current use of insulin ([address]) 2023   • H/O TIA (transient ischemic attack) and stroke 2023   • KAYLA (acute kidney injury) ([address]) 2023   • Hypothyroidism 2023   • Bradycardia 2023      LOS (days): 11  Geometric Mean LOS (GMLOS) (days): 2.60  Days to GMLOS:-8.6     OBJECTIVE:  Risk of Unplanned Readmission Score: 13.33         Current admission status: Inpatient   Preferred Pharmacy:   [address] [place] [address]  Phone: [phone] Fax: [phone]    [facility] [place] [address]  Phone: [phone] Fax: [phone]    Primary Care Provider: No primary care provider on file. Primary Insurance: MEDICARE  Secondary Insurance: AETNA    DISCHARGE DETAILS:  P tis being dc'd home on this date with OP follow up and [facility] to follow Rosibel Shields office). [facility] aware of dc and AVS faxed over.

## 2023-09-06 NOTE — PROGRESS NOTES
Progress Note- Avril Correa 79 y.o. female MRN: [id number]    Unit/Bed#: 410-01 Encounter: [id number]      Assessment and Plan:    Exam: Pt was laying in bed comfortably, A&O x 3, pleasant and cooperative. Abdomen is soft, non-distended, non-tender, with hypoactive BS x 4. Skin is non-icteric. Minimal to trace non-pitting edema noted of the b/l lower extremities upon exam today. Nausea  Resolved  Continue Lo Fiber/Residue diet and advance diet slowly as tolerated. Continue Banatrol TID to help provide stool bulk.      Diverticulosis with a history of recurrent diverticulitis  Resolving     C. difficile infection  Tenesmus  Diarrhea  Improving     Continue Dificid as prescribed to complete a 10 course. Any future use of antibiotics will require prophylactic vancomycin. We will follow up as an outpatient to discuss symptoms and possibility of an outpatient colonoscopy in 6-8 weeks as well as a possible consultation to Dr Maria Guadalupe Mcclain regarding possible fecal transplant in the future if symptoms persist and do not resolve.      There is already a task message to office staff to reach out and get her scheduled as discussed. Will discuss with attending as to whether or not patient can go home from a GI perspective today or tomorrow. GI will continue to follow up.   __________________________________________________________________    Subjective:     Patient is a 79 y.o. female who is currently being treated for diverticulosis with a history of recurrent diverticulitis, C. difficile, tenesmus, and nausea. She continues to report complete resolution of her nausea and that her abdominal discomfort is more of a pressure. She continues to tolerate a regular diet without any nausea or vomiting.   Since yesterday when we discontinue the vancomycin start her on the Dificid, her bowel movements have drastically slowed down as she went from at least 2 bowel movements an hour to 1 small liquid/mucousy bowel movement every 2-3 hours as she is only had 3 bowel movements since midnight. She continues to deny any blood or melena. Medication Administration - last 24 hours from 09/05/2023 1038 to 09/06/2023 1038       Date/Time Order Dose Route Action Action by     09/06/2023 0903 EDT aspirin (ECOTRIN LOW STRENGTH) EC tablet 81 mg 81 mg Oral Given Vallorie Reusing, LPN     98/16/0147 5116 EDT levothyroxine tablet 88 mcg 88 mcg Oral Given Natacha Holly, RN     09/05/2023 1545 EDT pravastatin (PRAVACHOL) tablet 80 mg 80 mg Oral Given Abelardo Shelley, LPN     80/86/7258 8741 EDT nicotine (NICODERM CQ) 7 mg/24hr TD 24 hr patch 1 patch 1 patch Transdermal Not Given Vallorie Reusing, LPN     23/73/4358 4111 EDT heparin (porcine) subcutaneous injection 5,000 Units 5,000 Units Subcutaneous Given Natacha Holly, RN     09/05/2023 2155 EDT heparin (porcine) subcutaneous injection 5,000 Units 5,000 Units Subcutaneous Given Natacha Holly, RN     09/05/2023 1523 EDT heparin (porcine) subcutaneous injection 5,000 Units 5,000 Units Subcutaneous Given Madelinea Karsten, ROSSY     98/31/5665 2004 EDT ALPRAZolam Noah Lemon) tablet 0.25 mg 0.25 mg Oral Given Natacha Holly, RN     09/06/2023 9588 EDT saccharomyces boulardii (FLORASTOR) capsule 250 mg 250 mg Oral Given Vallorie Reusing, LPN     62/56/9806 3358 EDT saccharomyces boulardii (FLORASTOR) capsule 250 mg 250 mg Oral Given Arelia Karsten, MICHELLEN     44/41/9847 2275 EDT vancomycin (VANCOCIN) capsule 250 mg 250 mg Oral Not Given Madelinea Karsten, MICHELLEN     54/34/4720 6995 EDT fidaxomicin (DIFICID) tablet 200 mg 200 mg Oral Given Vallorie Reusing, LPN     81/74/6501 9851 EDT fidaxomicin (DIFICID) tablet 200 mg 200 mg Oral Given Natacha Holly, ANDREW     09/05/2023 1523 EDT fidaxomicin (DIFICID) tablet 200 mg 200 mg Oral Given Abelardo Shelley LPN          Objective:     Vitals: Blood pressure 142/73, pulse 68, temperature 100 °F (37.8 °C), resp.  rate 16, height 5' 3" (1.6 m), weight 77.7 kg (171 lb 4.8 oz), SpO2 95 %. ,Body mass index is 30.34 kg/m². Intake/Output Summary (Last 24 hours) at 9/6/2023 1038  Last data filed at 9/6/2023 0814  Gross per 24 hour   Intake 180 ml   Output --   Net 180 ml       Invasive Devices     None                 Lab Results:  No results displayed because visit has over 200 results. Imaging Studies: I have personally reviewed pertinent imaging studies.

## 2023-09-06 NOTE — PLAN OF CARE
Problem: PAIN - ADULT  Goal: Verbalizes/displays adequate comfort level or baseline comfort level  Description: Interventions:  - Encourage patient to monitor pain and request assistance  - Assess pain using appropriate pain scale  - Administer analgesics based on type and severity of pain and evaluate response  - Implement non-pharmacological measures as appropriate and evaluate response  - Consider cultural and social influences on pain and pain management  - Notify physician/advanced practitioner if interventions unsuccessful or patient reports new pain  Outcome: Progressing     Problem: INFECTION - ADULT  Goal: Absence or prevention of progression during hospitalization  Description: INTERVENTIONS:  - Assess and monitor for signs and symptoms of infection  - Monitor lab/diagnostic results  - Monitor all insertion sites, i.e. indwelling lines, tubes, and drains  - Monitor endotracheal if appropriate and nasal secretions for changes in amount and color  - [place] appropriate cooling/warming therapies per order  - Administer medications as ordered  - Instruct and encourage patient and family to use good hand hygiene technique  - Identify and instruct in appropriate isolation precautions for identified infection/condition  Outcome: Progressing     Problem: SAFETY ADULT  Goal: Patient will remain free of falls  Description: INTERVENTIONS:  - Educate patient/family on patient safety including physical limitations  - Instruct patient to call for assistance with activity   - Consult OT/PT to assist with strengthening/mobility   - Keep Call bell within reach  - Keep bed low and locked with side rails adjusted as appropriate  - Keep care items and personal belongings within reach  - Initiate and maintain comfort rounds  - Make Fall Risk Sign visible to staff  - Offer Toileting every two  Hours, in advance of need  - Initiate/Maintain bed alarm  - Obtain necessary fall risk management equipment: non slip socks   - Apply yellow socks and bracelet for high fall risk patients  - Consider moving patient to room near nurses station  Outcome: Progressing     Problem: DISCHARGE PLANNING  Goal: Discharge to home or other facility with appropriate resources  Description: INTERVENTIONS:  - Identify barriers to discharge w/patient and caregiver  - Arrange for needed discharge resources and transportation as appropriate  - Identify discharge learning needs (meds, wound care, etc.)  - Arrange for interpretive services to assist at discharge as needed  - Refer to Case Management Department for coordinating discharge planning if the patient needs post-hospital services based on physician/advanced practitioner order or complex needs related to functional status, cognitive ability, or social support system  Outcome: Progressing     Problem: Knowledge Deficit  Goal: Patient/family/caregiver demonstrates understanding of disease process, treatment plan, medications, and discharge instructions  Description: Complete learning assessment and assess knowledge base.   Interventions:  - Provide teaching at level of understanding  - Provide teaching via preferred learning methods  Outcome: Progressing     Problem: GASTROINTESTINAL - ADULT  Goal: Minimal or absence of nausea and/or vomiting  Description: INTERVENTIONS:  - Administer IV fluids if ordered to ensure adequate hydration  - Administer ordered antiemetic medications as needed  - Provide nonpharmacologic comfort measures as appropriate  - Advance diet as tolerated, if ordered  - Consider nutrition services referral to assist patient with adequate nutrition and appropriate food choices  Outcome: Progressing  Goal: Maintains or returns to baseline bowel function  Description: INTERVENTIONS:  - Assess bowel function  - Encourage oral fluids to ensure adequate hydration  - Administer IV fluids if ordered to ensure adequate hydration  - Administer ordered medications as needed  - Encourage mobilization and activity  - Consider nutritional services referral to assist patient with adequate nutrition and appropriate food choices  Outcome: Progressing     Problem: METABOLIC, FLUID AND ELECTROLYTES - ADULT  Goal: Electrolytes maintained within normal limits  Description: INTERVENTIONS:  - Monitor labs and assess patient for signs and symptoms of electrolyte imbalances  - Administer electrolyte replacement as ordered  - Monitor response to electrolyte replacements, including repeat lab results as appropriate  - Instruct patient on fluid and nutrition as appropriate  Outcome: Progressing  Goal: Fluid balance maintained  Description: INTERVENTIONS:  - Monitor labs   - Monitor I/O and WT  - Instruct patient on fluid and nutrition as appropriate  - Assess for signs & symptoms of volume excess or deficit  Outcome: Progressing     Problem: Nutrition/Hydration-ADULT  Goal: Nutrient/Hydration intake appropriate for improving, restoring or maintaining nutritional needs  Description: Monitor and assess patient's nutrition/hydration status for malnutrition. Collaborate with interdisciplinary team and initiate plan and interventions as ordered. Monitor patient's weight and dietary intake as ordered or per policy. Utilize nutrition screening tool and intervene as necessary. Determine patient's food preferences and provide high-protein, high-caloric foods as appropriate.      INTERVENTIONS:  - Monitor oral intake, urinary output, labs, and treatment plans  - Assess nutrition and hydration status and recommend course of action  - Evaluate amount of meals eaten  - Assist patient with eating if necessary   - Allow adequate time for meals  - Recommend/ encourage appropriate diets, oral nutritional supplements, and vitamin/mineral supplements  - Order, calculate, and assess calorie counts as needed  - Recommend, monitor, and adjust tube feedings and TPN/PPN based on assessed needs  - Assess need for intravenous fluids  - Provide specific nutrition/hydration education as appropriate  - Include patient/family/caregiver in decisions related to nutrition  Outcome: Progressing

## 2023-09-06 NOTE — ASSESSMENT & PLAN NOTE
Lab Results   Component Value Date    HGBA1C 6.0 (H) 08/28/2023       (P) [id number]     · Diet-controlled  · ISS with blood glucose monitoring

## 2023-09-06 NOTE — CASE MANAGEMENT
Case Management Discharge Planning Note    Patient name Jerilyn Lux  Location /225-68 MRN [id number]  : 1940 Date 2023       Current Admission Date: 2023  Current Admission Diagnosis:C. difficile colitis   Patient Active Problem List    Diagnosis Date Noted   • C. difficile colitis 2023   • Sigmoid diverticulitis 2023   • Other hyperlipidemia 2023   • Adrenal adenoma, left 2023   • Diverticulitis 2023   • Type 2 diabetes mellitus with hypoglycemia without coma, without long-term current use of insulin ([address]) 2023   • H/O TIA (transient ischemic attack) and stroke 2023   • KAYLA (acute kidney injury) ([address]) 2023   • Hypothyroidism 2023   • Bradycardia 2023      LOS (days): 11  Geometric Mean LOS (GMLOS) (days): 2.60  Days to GMLOS:-8.5     OBJECTIVE:  Risk of Unplanned Readmission Score: 13.33         Current admission status: Inpatient   Preferred Pharmacy:   [address] [place] [address]  Phone: [phone] Fax: [phone]    [facility] [place] [address]  Phone: [phone] Fax: [phone]    Primary Care Provider: No primary care provider on file. Primary Insurance: MEDICARE  Secondary Insurance: AETNA    DISCHARGE DETAILS:  Pt is a likely dc home later on today with OP follow up and VNA set up ([facility]).

## 2023-09-06 NOTE — PROGRESS NOTES
[address]  Progress Note  Name: Chester Rasmussen I  MRN: [id number]  Unit/Bed#: 390-12 I Date of Admission: 8/25/2023   Date of Service: 9/6/2023 I Hospital Day: 11    Assessment/Plan   * C. difficile colitis  Assessment & Plan  · C diff testing positive  · Vanco discontinued  · Dificid initiated as per GI recommendation  · Monitor stool output - reporting decreased frequency of BMs and decreased amount of stool  · Advancement of diet to bland, low residue  · Banatrol as needed  · Low dose bentyl p.r.n for cramping  · Appreciate ongoing GI recs  · [facility] once discharged      Other hyperlipidemia  Assessment & Plan  · Continue on simvastatin 40 mg p.o. at bedtime  · Will give formulary alternative pravastatin 80 mg p.o. at bedtime  · PCP follow-up post discharge     Sigmoid diverticulitis  Assessment & Plan  · Presented to the emergency room with complaints of abdominal pain. Associated with fever, nausea, and non-bloody, mucus loose stools  · Patient was admitted from 8/13-8/16 for jejunal diverticulitis. Was being treated with PO Augmentin as outpatient  · CT abd/pelvis: Interval increased diffuse sigmoid wall thickening with adjacent inflammatory change, most concerning for acute diverticulitis. No fluid collections. 2. Improving inflamed jejunal diverticulum.   · Remains afebrile, wo leukocytosis  · Diet was advanced to bland, low residue   · Zosyn discontinued  · Outpatient general surgery evaluation - note the patient has refused surgery for the recurrent diverticulitis in the past.  · Appreciate GI consult      Adrenal adenoma, left  Assessment & Plan  · Outpatient work-up and surveillance imaging with PCP    Hypothyroidism  Assessment & Plan  · Check a TSH level, TSH 3.247 (08/28/2023)  · Continue levothyroxine    Type 2 diabetes mellitus with hypoglycemia without coma, without long-term current use of insulin ([address])  Assessment & Plan  Lab Results   Component Value Date    HGBA1C 6.0 (H) 2023       (P) [id number]     · Diet-controlled  · ISS with blood glucose monitoring                 VTE Pharmacologic Prophylaxis: VTE Score: 5 heparin  Patient Centered Rounds: I performed bedside rounds with nursing staff today. Discussions with Specialists or Other Care Team Provider: Case management    Education and Discussions with Family / Patient: Patient declined call to . Total Time Spent on Date of Encounter in care of patient: 35 minutes This time was spent on one or more of the following: performing physical exam; counseling and coordination of care; obtaining or reviewing history; documenting in the medical record; reviewing/ordering tests, medications or procedures; communicating with other healthcare professionals and discussing with patient's family/caregivers. Current Length of Stay: 11 day(s)  Current Patient Status: Inpatient   Certification Statement: GI cleared patient for discharge today, patient still considering discharge, will check in later   Discharge Plan: Anticipate discharge later today or tomorrow to home with home services. Code Status: Level 1 - Full Code    Subjective:   Patient reports decreased frequency and amount of diarrhea. BM about every 2-3 hours now. Notes mucous in stool and ongoing tenesmus. She says she is doing well with eating. Denies cramping, nausea, vomiting, SOB, dizziness. Reports some leg swelling. Objective:     Vitals:   Temp (24hrs), Av.8 °F (37.1 °C), Min:97.8 °F (36.6 °C), Max:100 °F (37.8 °C)    Temp:  [97.8 °F (36.6 °C)-100 °F (37.8 °C)] 100 °F (37.8 °C)  HR:  [57-68] 68  Resp:  [15-16] 16  BP: (127-142)/(62-75) 142/73  SpO2:  [95 %-96 %] 95 %  Body mass index is 30.34 kg/m². Input and Output Summary (last 24 hours):      Intake/Output Summary (Last 24 hours) at 2023 0930  Last data filed at 2023 0814  Gross per 24 hour   Intake 180 ml   Output --   Net 180 ml       Physical Exam:   Physical Exam  Constitutional:       General: She is not in acute distress. HENT:      Head: Normocephalic and atraumatic. Cardiovascular:      Rate and Rhythm: Normal rate and regular rhythm. Pulses: Normal pulses. Heart sounds: Normal heart sounds. Pulmonary:      Effort: Pulmonary effort is normal. No respiratory distress. Breath sounds: Normal breath sounds. Abdominal:      General: Abdomen is flat. There is no distension. Palpations: Abdomen is soft. Tenderness: There is no abdominal tenderness. Comments: Slightly hypoactive bs   Musculoskeletal:      Right lower leg: Edema (Very mild) present. Left lower leg: Edema (Very mild) present. Skin:     General: Skin is warm and dry. Neurological:      General: No focal deficit present. Mental Status: She is alert and oriented to person, place, and time.    Psychiatric:         Mood and Affect: Mood normal.         Behavior: Behavior normal.          Additional Data:     Labs:  Results from last 7 days   Lab Units 09/01/23  0440   WBC Thousand/uL 5.08   HEMOGLOBIN g/dL 11.0*   HEMATOCRIT % 33.9*   PLATELETS Thousands/uL 273   NEUTROS PCT % 54   LYMPHS PCT % 25   MONOS PCT % 15*   EOS PCT % 4     Results from last 7 days   Lab Units 09/03/23  0534   SODIUM mmol/L 144   POTASSIUM mmol/L 3.6   CHLORIDE mmol/L 109*   CO2 mmol/L 29   BUN mg/dL 5   CREATININE mg/dL 0.72   ANION GAP mmol/L 6   CALCIUM mg/dL 8.5   GLUCOSE RANDOM mg/dL 84         Results from last 7 days   Lab Units 09/06/23  0731 09/05/23  2158 09/05/23  1630 09/04/23  2123 09/04/23  1600 09/04/23  1112 09/04/23  0713 09/03/23  2050 09/03/23  1519 09/03/23  1055 09/03/23  0730 09/02/23  2127   POC GLUCOSE mg/dl 79 116 98 86 93 92 93 118 133 141* 81 121               Lines/Drains:  Invasive Devices     None                       Imaging: Reviewed radiology reports from this admission including: ultrasound(s)    Recent Cultures (last 7 days):         Last 24 Hours Medication List:   Current Facility-Administered Medications   Medication Dose Route Frequency Provider Last Rate   • acetaminophen  650 mg Oral Q6H PRN Shaun Calvillo PA-C     • ALPRAZolam  0.25 mg Oral BID PRN Annie Sigala MD     • aluminum-magnesium hydroxide-simethicone  30 mL Oral Q4H PRN Chase Olsen PA-C     • aspirin  81 mg Oral Daily Shaun Calvillo PA-C     • dextrose  25 mL Intravenous Daily PRN Odessa Moritz Hostetter, DO     • dicyclomine  10 mg Oral 4x Daily PRN Annie Sigala MD     • fidaxomicin  200 mg Oral Q12H [facility] Yoselin Danielson PA-C     • heparin (porcine)  5,000 Units Subcutaneous Q8H [facility] Shaun Calvillo PA-C     • levothyroxine  88 mcg Oral Early Morning Shaun Calvillo PA-C     • nicotine  1 patch Transdermal Daily Shaun Calvillo PA-C     • pravastatin  80 mg Oral Daily With Dinner Shaun Calvillo PA-C     • saccharomyces boulardii  250 mg Oral BID Yoselin Danielson PA-C     • trimethobenzamide  200 mg Intramuscular Q6H PRN Chase Olsen PA-C          Today, Patient Was Seen By: Wilfrid Avery    **Please Note: This note may have been constructed using a voice recognition system. **

## 2023-09-06 NOTE — NURSING NOTE
PATIENT LEFT FLOOR VIA WHEELCHAIR IN STABLE CONDITION. DISCHARGE INSTRUCTIONS REVIEWED WITH VERBAL UNDERSTANDING OBTAINED.

## 2023-09-06 NOTE — DISCHARGE INSTRUCTIONS
Attached is food good for fiber intake which can help with diarrhea. Slowly incorporate these foods into your diet   Probiotics are a great source of good bacteria that should be in your colon  Recommend low lactose diet if possible  Continue antibiotics for c diff.  If you need antibiotics for future infections - it is recommended to take treatment for c diff at the same time  [facility] [place] [place] your hands thoroughly after using the bathroom

## 2023-09-06 NOTE — ASSESSMENT & PLAN NOTE
· C diff testing positive  · Vanco discontinued  · Dificid initiated as per GI recommendation  · Monitor stool output - reporting decreased frequency of BMs and decreased amount of stool  · Advancement of diet to bland, low residue  · Banatrol as needed  · Low dose bentyl p.r.n for cramping  · Appreciate ongoing GI recs  · C once discharged

## 2023-09-06 NOTE — PLAN OF CARE
Problem: PAIN - ADULT  Goal: Verbalizes/displays adequate comfort level or baseline comfort level  Description: Interventions:  - Encourage patient to monitor pain and request assistance  - Assess pain using appropriate pain scale  - Administer analgesics based on type and severity of pain and evaluate response  - Implement non-pharmacological measures as appropriate and evaluate response  - Consider cultural and social influences on pain and pain management  - Notify physician/advanced practitioner if interventions unsuccessful or patient reports new pain  Outcome: Progressing     Problem: INFECTION - ADULT  Goal: Absence or prevention of progression during hospitalization  Description: INTERVENTIONS:  - Assess and monitor for signs and symptoms of infection  - Monitor lab/diagnostic results  - Monitor all insertion sites, i.e. indwelling lines, tubes, and drains  - Monitor endotracheal if appropriate and nasal secretions for changes in amount and color  - [place] appropriate cooling/warming therapies per order  - Administer medications as ordered  - Instruct and encourage patient and family to use good hand hygiene technique  - Identify and instruct in appropriate isolation precautions for identified infection/condition  Outcome: Progressing     Problem: SAFETY ADULT  Goal: Patient will remain free of falls  Description: INTERVENTIONS:  - Educate patient/family on patient safety including physical limitations  - Instruct patient to call for assistance with activity   - Consult OT/PT to assist with strengthening/mobility   - Keep Call bell within reach  - Keep bed low and locked with side rails adjusted as appropriate  - Keep care items and personal belongings within reach  - Initiate and maintain comfort rounds  - Make Fall Risk Sign visible to staff  - Offer Toileting every two  Hours, in advance of need  - Initiate/Maintain bed alarm  - Obtain necessary fall risk management equipment: non slip socks   - Apply yellow socks and bracelet for high fall risk patients  - Consider moving patient to room near nurses station  Outcome: Progressing     Problem: DISCHARGE PLANNING  Goal: Discharge to home or other facility with appropriate resources  Description: INTERVENTIONS:  - Identify barriers to discharge w/patient and caregiver  - Arrange for needed discharge resources and transportation as appropriate  - Identify discharge learning needs (meds, wound care, etc.)  - Arrange for interpretive services to assist at discharge as needed  - Refer to Case Management Department for coordinating discharge planning if the patient needs post-hospital services based on physician/advanced practitioner order or complex needs related to functional status, cognitive ability, or social support system  Outcome: Progressing     Problem: Knowledge Deficit  Goal: Patient/family/caregiver demonstrates understanding of disease process, treatment plan, medications, and discharge instructions  Description: Complete learning assessment and assess knowledge base.   Interventions:  - Provide teaching at level of understanding  - Provide teaching via preferred learning methods  Outcome: Progressing     Problem: GASTROINTESTINAL - ADULT  Goal: Minimal or absence of nausea and/or vomiting  Description: INTERVENTIONS:  - Administer IV fluids if ordered to ensure adequate hydration  - Administer ordered antiemetic medications as needed  - Provide nonpharmacologic comfort measures as appropriate  - Advance diet as tolerated, if ordered  - Consider nutrition services referral to assist patient with adequate nutrition and appropriate food choices  Outcome: Progressing  Goal: Maintains or returns to baseline bowel function  Description: INTERVENTIONS:  - Assess bowel function  - Encourage oral fluids to ensure adequate hydration  - Administer IV fluids if ordered to ensure adequate hydration  - Administer ordered medications as needed  - Encourage mobilization and activity  - Consider nutritional services referral to assist patient with adequate nutrition and appropriate food choices  Outcome: Progressing     Problem: METABOLIC, FLUID AND ELECTROLYTES - ADULT  Goal: Electrolytes maintained within normal limits  Description: INTERVENTIONS:  - Monitor labs and assess patient for signs and symptoms of electrolyte imbalances  - Administer electrolyte replacement as ordered  - Monitor response to electrolyte replacements, including repeat lab results as appropriate  - Instruct patient on fluid and nutrition as appropriate  Outcome: Progressing  Goal: Fluid balance maintained  Description: INTERVENTIONS:  - Monitor labs   - Monitor I/O and WT  - Instruct patient on fluid and nutrition as appropriate  - Assess for signs & symptoms of volume excess or deficit  Outcome: Progressing     Problem: Nutrition/Hydration-ADULT  Goal: Nutrient/Hydration intake appropriate for improving, restoring or maintaining nutritional needs  Description: Monitor and assess patient's nutrition/hydration status for malnutrition. Collaborate with interdisciplinary team and initiate plan and interventions as ordered. Monitor patient's weight and dietary intake as ordered or per policy. Utilize nutrition screening tool and intervene as necessary. Determine patient's food preferences and provide high-protein, high-caloric foods as appropriate.      INTERVENTIONS:  - Monitor oral intake, urinary output, labs, and treatment plans  - Assess nutrition and hydration status and recommend course of action  - Evaluate amount of meals eaten  - Assist patient with eating if necessary   - Allow adequate time for meals  - Recommend/ encourage appropriate diets, oral nutritional supplements, and vitamin/mineral supplements  - Order, calculate, and assess calorie counts as needed  - Recommend, monitor, and adjust tube feedings and TPN/PPN based on assessed needs  - Assess need for intravenous fluids  - Provide specific nutrition/hydration education as appropriate  - Include patient/family/caregiver in decisions related to nutrition  Outcome: Progressing

## 2023-09-08 NOTE — ASSESSMENT & PLAN NOTE
· Presented to the emergency room with complaints of abdominal pain. Associated with fever, nausea, and non-bloody, mucus loose stools  · Patient was admitted from 8/13-8/16 for jejunal diverticulitis. Was being treated with PO Augmentin as outpatient  · CT abd/pelvis: Interval increased diffuse sigmoid wall thickening with adjacent inflammatory change, most concerning for acute diverticulitis. No fluid collections. 2. Improving inflamed jejunal diverticulum.   · Remains afebrile, wo leukocytosis  · Diet was advanced to bland, low residue   · Zosyn discontinued  · Outpatient general surgery evaluation - note the patient has refused surgery for the recurrent diverticulitis in the past.  · Appreciate GI consult  · resolved

## 2023-09-08 NOTE — ASSESSMENT & PLAN NOTE
Lab Results   Component Value Date    HGBA1C 6.0 (H) 08/28/2023       (P) 93.4     · Diet-controlled  · ISS with blood glucose monitoring

## 2023-09-08 NOTE — ASSESSMENT & PLAN NOTE
· C diff testing positive  · Vanco discontinued  · Dificid initiated as per GI recommendation  · Monitor stool output - reporting decreased frequency of BMs and decreased amount of stool  · Advancement of diet to bland, low residue  · Banatrol as needed  · Low dose bentyl p.r.n for cramping  · Appreciate ongoing GI recs  · Improved after initiation of deficid, electrolytes stable, cleared for discharge  · [facility] ordered for patient

## 2023-09-08 NOTE — DISCHARGE SUMMARY
[address]  Discharge- Electa Gaetano 1940, 79 y.o. female MRN: [id number]  Unit/Bed#: 410-01 Encounter: [id number]  Primary Care Provider: No primary care provider on file. Date and time admitted to hospital: 8/25/2023  5:48 PM    Other hyperlipidemia  Assessment & Plan  · Continue on simvastatin 40 mg p.o. at bedtime  · Will give formulary alternative pravastatin 80 mg p.o. at bedtime  · PCP follow-up post discharge     Sigmoid diverticulitis  Assessment & Plan  · Presented to the emergency room with complaints of abdominal pain. Associated with fever, nausea, and non-bloody, mucus loose stools  · Patient was admitted from 8/13-8/16 for jejunal diverticulitis. Was being treated with PO Augmentin as outpatient  · CT abd/pelvis: Interval increased diffuse sigmoid wall thickening with adjacent inflammatory change, most concerning for acute diverticulitis. No fluid collections. 2. Improving inflamed jejunal diverticulum.   · Remains afebrile, wo leukocytosis  · Diet was advanced to bland, low residue   · Zosyn discontinued  · Outpatient general surgery evaluation - note the patient has refused surgery for the recurrent diverticulitis in the past.  · Appreciate GI consult  · resolved      Adrenal adenoma, left  Assessment & Plan  · Outpatient work-up and surveillance imaging with PCP    Hypothyroidism  Assessment & Plan  · Check a TSH level, TSH 3.247 (08/28/2023)  · Continue levothyroxine    Type 2 diabetes mellitus with hypoglycemia without coma, without long-term current use of insulin ([address])  Assessment & Plan  Lab Results   Component Value Date    HGBA1C 6.0 (H) 08/28/2023       (P) 93.4     · Diet-controlled  · ISS with blood glucose monitoring      * C. difficile colitis  Assessment & Plan  · C diff testing positive  · Vanco discontinued  · Dificid initiated as per GI recommendation  · Monitor stool output - reporting decreased frequency of BMs and decreased amount of stool  · Advancement of diet to bland, low residue  · Banatrol as needed  · Low dose bentyl p.r.n for cramping  · Appreciate ongoing GI recs  · Improved after initiation of deficid, electrolytes stable, cleared for discharge  · [address] ordered for patient        Medical Problems     Resolved Problems  Date Reviewed: 9/4/2023          Resolved    Hypomagnesemia 8/29/2023     Resolved by  Tiana Donahue PA-C    Hypokalemia 9/3/2023     Resolved by  Tiana Donahue PA-C        Discharging Physician / Practitioner: Panda Lazaro PA-C  PCP: No primary care provider on file. Admission Date:   Admission Orders (From admission, onward)     Ordered        08/26/23 1026  Inpatient Admission  Once            08/25/23 2035  Place in Observation  Once                      Discharge Date: 9/6/23    Consultations During Hospital Stay:  · GI    Procedures Performed:   · none    Significant Findings / Test Results:   VAS lower limb venous duplex study, unilateral/limited   Final Result      CT abdomen pelvis wo contrast   Final Result   1. Interval increased diffuse sigmoid wall thickening with adjacent inflammatory change, most concerning for acute diverticulitis. No fluid collections. 2. Improving inflamed jejunal diverticulum. Workstation performed: [id number]               Incidental Findings:   · As above    Test Results Pending at Discharge (will require follow up):   · none     Outpatient Tests Requested:  · Bmp, mag    Complications:  none    Reason for Admission: diarrhea, abdominal pain    Hospital Course:   Jeanette Perez is a 79 y.o. female patient who originally presented to the hospital on 8/25/2023 due to left lower quadrant and suprapubic abdominal pain. She reports that she did having abdominal pain since yesterday and progressively worse. Pain is associated with nausea and loose nonbloody stool.   She did state loose stool has been occurring since her last admission August 13 to August 16, 2023. Patient was admitted during that. For jejunal diverticulitis. She was treated on discharge and is being treated with Augmentin 875/125 3 times daily. Patient denies having any chest pain, shortness of breath, cough, vomiting, dizziness, weakness. She does admit to having a fever of 101.0 Fahrenheit.     In the emergency room Included labs significant for WBC of 16.69, magnesium of 1.5 labs otherwise unremarkable. CT abdomen and pelvis shows interval increase diffuse sigmoid wall thickening with adjacent inflammatory change, most concerning for acute diverticulitis. No fluid collections. The emergency room she received IV ceftriaxone and IV Flagyl. She also received Zofran 4 mg IV. At bedside in ER room 6 patient is awake and alert in no acute distress states that he will has some abdominal discomfort still feels slightly nauseous.     c diff positive. Completed abx for diverticulitis. Persistent diarrhea despite increasing doses of PO vancomycin and bannatrol with meals. GI trialed on deficid which improved patient's frequency in BM. Given bentyl for patient's complaint of tenesmus. Tolerating PO intake fully without issue. Electrolytes remained stable. Please see above list of diagnoses and related plan for additional information. Condition at Discharge: good, continues to improve    Discharge Day Visit / Exam:   * Please refer to separate progress note for these details *    Discussion with Family: Patient declined call to . Discharge instructions/Information to patient and family:   See after visit summary for information provided to patient and family. Provisions for Follow-Up Care:  See after visit summary for information related to follow-up care and any pertinent home health orders.        Disposition:   Home with [facility] Services (Reminder: Complete face to face encounter)    Planned Readmission: none     Discharge Statement:  I spent 60 minutes discharging the patient. This time was spent on the day of discharge. I had direct contact with the patient on the day of discharge. Greater than 50% of the total time was spent examining patient, answering all patient questions, arranging and discussing plan of care with patient as well as directly providing post-discharge instructions. Additional time then spent on discharge activities. Discharge Medications:  See after visit summary for reconciled discharge medications provided to patient and/or family.       **Please Note: This note may have been constructed using a voice recognition system**

## 2023-09-14 ENCOUNTER — OFFICE VISIT (OUTPATIENT)
Age: 83
End: 2023-09-14
Payer: MEDICARE

## 2023-09-14 VITALS
DIASTOLIC BLOOD PRESSURE: 80 MMHG | BODY MASS INDEX: 28.17 KG/M2 | HEART RATE: 74 BPM | OXYGEN SATURATION: 98 % | HEIGHT: 63 IN | WEIGHT: 159 LBS | SYSTOLIC BLOOD PRESSURE: 122 MMHG | RESPIRATION RATE: 18 BRPM

## 2023-09-14 DIAGNOSIS — K57.32 SIGMOID DIVERTICULITIS: ICD-10-CM

## 2023-09-14 DIAGNOSIS — A04.72 C. DIFFICILE COLITIS: ICD-10-CM

## 2023-09-14 DIAGNOSIS — R19.8 RECTAL PRESSURE: Primary | ICD-10-CM

## 2023-09-14 DIAGNOSIS — Z12.11 SCREENING FOR COLON CANCER: Primary | ICD-10-CM

## 2023-09-14 DIAGNOSIS — A09 DIARRHEA OF INFECTIOUS ORIGIN: ICD-10-CM

## 2023-09-14 PROCEDURE — 99214 OFFICE O/P EST MOD 30 MIN: CPT | Performed by: NURSE PRACTITIONER

## 2023-09-14 RX ORDER — MESALAMINE 800 MG/1
TABLET, DELAYED RELEASE ORAL
Qty: 60 TABLET | Refills: 1 | Status: SHIPPED | OUTPATIENT
Start: 2023-09-14

## 2023-09-14 NOTE — TELEPHONE ENCOUNTER
I called the patient and reviewed with her that I did discuss her current situation and everything we discussed at her office visit today with Dr. Luanne Mccarthy as promised. At this point Dr. Luanne Mccarthy agrees that we should prep the patient for a regular colonoscopy, use a pediatric scope, and then proceed as far as we can safely as far as we can through the colon and if not then she just had a flex sigmoidoscopy, especially because of the bleeding it is best for us to see as much of the colon as we can at this point. I also explained the patient that we would like to proceed with a colonoscopy so we can go ahead with repeat biopsies to confirm the possibility of ulcerative colitis or Crohn's and this way we can then have a better idea of how to proceed with treatment with regards to medications to treat IBD. I advised the patient that I also discussed the mesalamine with Dr. Luanne Mccarthy who feels that for now she should stop taking it and hold off until we have the confirmation of the IBD from the colonoscopy and pathology. The patient was agreeable and verbalized an understanding. However, the patient was agreeable to proceeding with the procedure as long as she can get it scheduled with Dr. Luanne Mccarthy and is hesitant about doing the prep for the colonoscopy as she feels she is currently too weak to do a bowel prep, but for now wants us to put the orders in, order the bowel prep, she is going to discuss everything with her children.

## 2023-09-14 NOTE — PATIENT INSTRUCTIONS
Complete Dificid. Start of Mesalamine 800 mg, 1 pill 2 x daily with food. Will discuss colonoscopy with pediatric scope vs flex sigmoidoscopy with attending. Hold off any follow up office visits for now. If blood continues or worsens, please contact our office or go to the ER.

## 2023-09-14 NOTE — PROGRESS NOTES
[facility] - Outpatient Follow-up Note  Serjio Ortiz 79 y.o. female MRN: [id number]  Encounter: [id number]          ASSESSMENT AND PLAN:    The patient presents today for follow-up office visit regarding her continued diarrhea, c-diff, and rectal pressure most likely secondary to colitis. Exam: Abdomen is soft, mildly distended, nontender, with hypoactive bowel sounds x4. Trace edema noted of the b/l lower extremities upon exam today. Skin is non-icteric. 1. Rectal pressure  2. Sigmoid diverticulitis  While the patient and her son were in the office today, I did agree with the patient that her current symptoms of the rectal pressure do correlate with the possibility of the diverticulitis/colitis and with the blood work performed the discussion of proceeding with a colonoscopy as discussed during her hospitalization. However, the patient is very hesitant to proceed with a colonoscopy because of her issue with colonoscopies and is afraid of a perforation and at this point wanted to know if we are going to proceed with a full colonoscopy would be with a pediatric scope or she thought she had talked to Dr. Cintia Yeager in the hospital who would suggest the possibility of a flex sigmoidoscopy since it seems her biggest issue is in the sigmoid colon. The patient is still quite hesitant to proceed with a colonoscopy or even flex sigmoidoscopy. I advised the patient that I would discuss it with Dr. Cintia Yeager because she knows the patient and/or Dr. Gisela Beyer who is the covering attending for today. The patient and her son were agreeable and verbalized understanding. In the meantime, to try to help with the inflammation from the diverticulitis and colitis I am going to start the patient on mesalamine 800 mg twice daily. The patient was agreeable and verbalized an understanding.     I did review with the patient that if the streaks of blood do not go away or the blood becomes more consistent and significant, she is to call our office or proceed to the nearest emergency room for more immediate evaluation. The patient and her son were agreeable and verbalized an understanding.    - mesalamine (ASACOL) 800 MG EC tablet; Take 1 PO BID. Dispense: 60 tablet; Refill: 1    3. Diarrhea of infectious origin  4. C. difficile colitis  I discussed with the patient that at this point in time she should finish out the Dificid as prescribed. However, at this point I am not convinced that her symptoms are related to the C. difficile anymore and more likely related to diverticulitis/colitis, especially with the rectal pressure. For now, we will hold off any other antibiotics, again, until I discuss it with one of the attendings as I promised the patient and her son that once I discuss her symptoms and treatment plan with them, we will reach out to them to discuss the recommendations and treatment plan options. The patient and her son were agreeable and verbalized an understanding. Encouraged the patient to try to continue with a high-fiber diet and the Banatrol. We will also hold off on the referral to Dr. Queenie Metcalf regarding any possible fecal transplant options as well as any repeat C. difficile testing for now. The patient and her son were agreeable and verbalized understanding.    - Ambulatory referral to Gastroenterology    The patient will schedule a follow up office visit as needed for now, but understands to call or contact our office if there are any issues or concerns in the mean time. ______________________________________________________________________    SUBJECTIVE: Patient is a 79 y.o. female who was previously seen in patient during her hospitalization on 8/25/23 by and presents today for follow-up office visit regarding her continued diarrhea, c-diff, and rectal pressure most likely secondary to colitis.   The patient reports that despite continuing with the Dificid 200 mg twice daily as prescribed as the patient only has today and tomorrow left to complete the 10-day course and continuing with the [facility], she continues to have 1-2 loose/mucus like bowel movements per hour with constant rectal pressure that worsens when she stands. The patient also reports that as of yesterday she is started to see streaks of blood in the mucus and when she wipes. She does not feel it is copious amounts of blood at this point. She continues to deny any abdominal pain, N/V, or reflux. The patient reports that in the past when she has had that diverticulitis and colitis, they had started her on mesalamine 800 mg twice daily for a few weeks and that deftly seem to help and wanted to know if it was something we could try again for now as her symptoms do not seem to be significantly improving as overall she feels she is pretty much at the same point she was when she went into the hospital for her last admission. However, at this point she is trying to avoid the ER and going back in the hospital unless it is absolutely necessary. Endoscopy History: EGD: (None):     COLONOSCOPY: (3/2020): No report to review. REVIEW OF SYSTEMS IS OTHERWISE NEGATIVE. Historical Information   Past Medical History:   Diagnosis Date   • Diverticulitis    • Hypertension    • TIA (transient ischemic attack)      Past Surgical History:   Procedure Laterality Date   • COLECTOMY       Social History   Social History     Substance and Sexual Activity   Alcohol Use Never     Social History     Substance and Sexual Activity   Drug Use Never     Social History     Tobacco Use   Smoking Status Every Day   • Packs/day: 0.25   • Types: Cigarettes   Smokeless Tobacco Never     History reviewed. No pertinent family history.     Meds/Allergies       Current Outpatient Medications:   •  acetaminophen (TYLENOL) 500 mg tablet  •  ALPRAZolam (XANAX) 0.25 mg tablet  •  aspirin (ECOTRIN LOW STRENGTH) 81 mg EC tablet  •  dicyclomine (BENTYL) 10 mg capsule  •  fidaxomicin (DIFICID) tablet  •  hydrochlorothiazide (HYDRODIURIL) 25 mg tablet  •  levothyroxine 88 mcg tablet  •  ondansetron (ZOFRAN) 4 mg tablet  •  saccharomyces boulardii (FLORASTOR) 250 mg capsule  •  simvastatin (ZOCOR) 40 mg tablet    Allergies   Allergen Reactions   • Iodine - Food Allergy Hives           Objective     Blood pressure 122/80, pulse 74, resp. rate 18, height 5' 3" (1.6 m), weight 72.1 kg (159 lb), SpO2 98 %. Body mass index is 28.17 kg/m². PHYSICAL EXAM:      General Appearance:   Alert, cooperative, no distress   HEENT:   Normocephalic, atraumatic, anicteric. Neck:  Supple, symmetrical, trachea midline   Lungs:   Clear to auscultation bilaterally; no rales, rhonchi or wheezing; respirations unlabored    Heart[de-identified]   Regular rate and rhythm; no murmur, rub, or gallop. Abdomen:   Soft, non-tender, non-distended; normal bowel sounds; no masses, no organomegaly    Genitalia:   Deferred    Rectal:   Deferred    Extremities:  No cyanosis, clubbing or edema    Pulses:  2+ and symmetric    Skin:  No jaundice, rashes, or lesions    Lymph nodes:  No palpable cervical lymphadenopathy        Lab Results:   No visits with results within 1 Day(s) from this visit. Latest known visit with results is:   No results displayed because visit has over 200 results. Radiology Results:   VAS lower limb venous duplex study, unilateral/limited    Result Date: 9/5/2023  Narrative:  [facility] REPORT CLINICAL: Indications: Patient presents with right lower extremity edema x 2 weeks. Operative History: Denies cardiovascular intervention Risk Factors The patient has history of HTN and smoking (current) 0.25 ppd. CONCLUSION: Impression:  RIGHT LOWER LIMB No evidence of acute or chronic deep vein thrombosis . No evidence of superficial thrombophlebitis noted. Doppler evaluation shows a normal response to augmentation maneuvers.  Popliteal, posterior tibial and anterior tibial arterial Doppler waveforms are triphasic/biphasic. LEFT LOWER LIMB LIMITED Evaluation shows no evidence of thrombus in the common femoral vein. Doppler evaluation shows a normal response to augmentation maneuvers. SIGNATURE: Electronically Signed by: Mago Glaser DO on 2023-09-05 02:29:30 PM    CT abdomen pelvis wo contrast    Result Date: 8/25/2023  Narrative: CT ABDOMEN AND PELVIS WITHOUT IV CONTRAST INDICATION:   LLQ abdominal pain Recent sigmoid then jejunal diverticulitis. Currently on abx. b/ LQ abd pain +fever today with nausea. cholecystectomy. COMPARISON: CT 8/13/2023 and priors TECHNIQUE:  CT examination of the abdomen and pelvis was performed without intravenous contrast. Multiplanar 2D reformatted images were created from the source data. This examination, like all CT scans performed in the [place], was performed utilizing techniques to minimize radiation dose exposure, including the use of iterative reconstruction and automated exposure control. Radiation dose length product (DLP) for this visit:  801 mGy-cm Enteric contrast was not administered. FINDINGS: ABDOMEN LOWER CHEST:  No clinically significant abnormality identified in the visualized lower chest. Bibasilar scarring. LIVER/BILIARY TREE: Calcified mass in the hepatic dome appears unchanged. GALLBLADDER: Gallbladder is surgically absent. SPLEEN:  Unremarkable. PANCREAS:  Unremarkable. ADRENAL GLANDS: Unremarkable right adrenal. Stable left adrenal adenomas. KIDNEYS/URETERS:  One or more simple renal cyst(s) is noted. Otherwise unremarkable kidneys. No hydronephrosis. STOMACH AND BOWEL:  There is colonic diverticulosis. Improving inflamed jejunal diverticulum. Interval increased diffuse sigmoid wall thickening with adjacent inflammatory change, most concerning for acute diverticulitis. No fluid collections. APPENDIX:  No findings to suggest appendicitis. ABDOMINOPELVIC CAVITY:  No ascites.   No pneumoperitoneum. No lymphadenopathy. VESSELS:  Unremarkable for patient's age. PELVIS REPRODUCTIVE ORGANS: Calcified fibroids. URINARY BLADDER:  Unremarkable. ABDOMINAL WALL/INGUINAL REGIONS:  Unremarkable. OSSEOUS STRUCTURES:  No acute fracture or destructive osseous lesion. Spine degenerative change and minimal scoliosis. Impression: 1. Interval increased diffuse sigmoid wall thickening with adjacent inflammatory change, most concerning for acute diverticulitis. No fluid collections. 2. Improving inflamed jejunal diverticulum.  Workstation performed: [id number]

## 2023-09-14 NOTE — TELEPHONE ENCOUNTER
Can you please call the patient to schedule her for a colonoscopy with Dr. Teressa Galo only as per the patient's preference. I did send the GoLytely bowel prep to her pharmacy. Can we also schedule a follow-up office visit 4 to 6 weeks after the colonoscopy with a physician. Thank you.

## 2023-09-15 ENCOUNTER — TELEPHONE (OUTPATIENT)
Age: 83
End: 2023-09-15

## 2023-09-15 RX ORDER — BISACODYL 5 MG/1
TABLET, DELAYED RELEASE ORAL
Qty: 2 TABLET | Refills: 0 | Status: SHIPPED | OUTPATIENT
Start: 2023-09-15

## 2023-09-15 NOTE — TELEPHONE ENCOUNTER
Pt is asking to speak with Montez baird.  She says she has some important questions for him about their recent conversation and wants a call as immediately as possible.   [phone]

## 2023-09-15 NOTE — TELEPHONE ENCOUNTER
Soonest available at [facility] with Dr. Elisa Snell is December. Patient agreeable instead to 10/4/23 at [facility] with Dr. Elisa Snell. Patient is going to think about it, but agreeable to be scheduled for the 10/4/23 date. Aware that a  is needed. Confirmed is not on any blood thinners. Prep instructions mailed to patient. However, patient did say that she did not get the Golytely at the pharmacy. Resent Golytely. Patient also had questions regarding her medication prescribed to her yesterday. She stated that she was informed to not take it until her procedure, but stated that she is still experiencing problems.

## 2023-09-15 NOTE — TELEPHONE ENCOUNTER
Called the patient back and she is reporting that she is still having frequent bowel movements and she is starting to see more blood every time she wipes, but still not in the toilet, still not a significant amount, but more than it was when it started earlier this week. She is scheduled for 10/4/23 with Dr Ashley Gamble. At this point I instructed the patient that it still seems normal for what her underlying etiology is and as long as it does not worsen or she does not start to see blood in the toilet changing the water color to red or pink or blood staining her underwear then for now it is okay to just keep monitoring her stools and mild bleeding and try to get her to her procedure as scheduled in October. However, I did discuss with the patient that if it worsens in any of those things changed to what we discussed, she then needs to go to the [facility] ER for evaluation and hopeful admission. The patient was agreeable and verbalized an understanding. The patient again asked if she could restart the mesalamine and at this point since she is on the lower dosage and I had talked to Dr. Ar Olson who did not see the harm in starting it, especially since it helped her before, for now I told her to start taking the mesalamine twice a day to give her some kind of relief or comfort because there is anything else she can take as she is finished with the Dificid at this point. The patient was very thankful and verbalized an understanding.

## 2023-09-21 ENCOUNTER — TELEPHONE (OUTPATIENT)
Age: 83
End: 2023-09-21

## 2023-09-21 NOTE — TELEPHONE ENCOUNTER
Patients GI provider:  Rosita Garcia    Number to return call: [phone]    Reason for call: Davidson Perez from [facility] calling to cancel the pt's colonoscopy, stated the pt wants to wait until after the holidays to reschedule.

## 2023-09-21 NOTE — TELEPHONE ENCOUNTER
Patients GI provider:  Dr. Damon Batter    Number to return call:[phone]    Reason for call: Pt calling to reschedule procedure to 11/14/23.     Scheduled procedure/appointment date if applicable: 6/20/45

## 2023-11-09 DIAGNOSIS — K57.32 SIGMOID DIVERTICULITIS: ICD-10-CM

## 2023-11-09 RX ORDER — MESALAMINE 800 MG/1
TABLET, DELAYED RELEASE ORAL
Qty: 180 TABLET | Refills: 1 | Status: SHIPPED | OUTPATIENT
Start: 2023-11-09

## 2023-11-13 PROBLEM — A09 DIARRHEA OF INFECTIOUS ORIGIN: Status: RESOLVED | Noted: 2023-09-14 | Resolved: 2023-11-13

## 2024-01-13 ENCOUNTER — APPOINTMENT (EMERGENCY)
Dept: RADIOLOGY | Facility: HOSPITAL | Age: 84
End: 2024-01-13
Payer: MEDICARE

## 2024-01-13 ENCOUNTER — HOSPITAL ENCOUNTER (EMERGENCY)
Facility: HOSPITAL | Age: 84
Discharge: HOME | End: 2024-01-13
Attending: EMERGENCY MEDICINE | Admitting: EMERGENCY MEDICINE
Payer: MEDICARE

## 2024-01-13 VITALS
TEMPERATURE: 98.7 F | SYSTOLIC BLOOD PRESSURE: 157 MMHG | DIASTOLIC BLOOD PRESSURE: 84 MMHG | OXYGEN SATURATION: 96 % | RESPIRATION RATE: 16 BRPM | HEART RATE: 72 BPM

## 2024-01-13 DIAGNOSIS — S82.402A CLOSED LEFT FIBULAR FRACTURE: Primary | ICD-10-CM

## 2024-01-13 PROBLEM — G45.9 TIA (TRANSIENT ISCHEMIC ATTACK): Status: ACTIVE | Noted: 2018-09-24

## 2024-01-13 PROBLEM — R80.9 PROTEINURIA: Status: ACTIVE | Noted: 2024-01-13

## 2024-01-13 PROBLEM — D18.02 CAVERNOUS HEMANGIOMA OF BRAIN (HCC): Status: ACTIVE | Noted: 2018-09-24

## 2024-01-13 PROBLEM — M21.619 BUNION: Status: ACTIVE | Noted: 2024-01-13

## 2024-01-13 PROBLEM — I10 ESSENTIAL HYPERTENSION: Status: ACTIVE | Noted: 2018-09-22

## 2024-01-13 PROBLEM — K57.30 DIVERTICULOSIS OF COLON: Status: ACTIVE | Noted: 2024-01-13

## 2024-01-13 PROBLEM — Z85.850 HISTORY OF MALIGNANT NEOPLASM OF THYROID: Status: ACTIVE | Noted: 2024-01-13

## 2024-01-13 PROBLEM — K57.92 DIVERTICULITIS: Status: ACTIVE | Noted: 2022-10-31

## 2024-01-13 PROBLEM — K21.9 ESOPHAGEAL REFLUX: Status: ACTIVE | Noted: 2024-01-13

## 2024-01-13 PROBLEM — K57.32 DIVERTICULITIS OF COLON: Status: ACTIVE | Noted: 2024-01-13

## 2024-01-13 PROCEDURE — 73630 X-RAY EXAM OF FOOT: CPT

## 2024-01-13 PROCEDURE — 99284 EMERGENCY DEPT VISIT MOD MDM: CPT | Performed by: PHYSICIAN ASSISTANT

## 2024-01-13 PROCEDURE — 99283 EMERGENCY DEPT VISIT LOW MDM: CPT

## 2024-01-13 PROCEDURE — 73610 X-RAY EXAM OF ANKLE: CPT

## 2024-01-13 PROCEDURE — 73590 X-RAY EXAM OF LOWER LEG: CPT

## 2024-01-13 RX ORDER — METOPROLOL SUCCINATE 25 MG/1
12.5 TABLET, EXTENDED RELEASE ORAL DAILY
COMMUNITY
Start: 2023-10-03 | End: 2024-10-02

## 2024-01-13 RX ORDER — OXYCODONE HYDROCHLORIDE AND ACETAMINOPHEN 5; 325 MG/1; MG/1
1 TABLET ORAL ONCE
Status: COMPLETED | OUTPATIENT
Start: 2024-01-13 | End: 2024-01-13

## 2024-01-13 RX ORDER — ENALAPRIL MALEATE 10 MG/1
10 TABLET ORAL DAILY
COMMUNITY

## 2024-01-13 RX ORDER — OXYCODONE HYDROCHLORIDE AND ACETAMINOPHEN 5; 325 MG/1; MG/1
1 TABLET ORAL EVERY 6 HOURS PRN
Qty: 10 TABLET | Refills: 0 | Status: SHIPPED | OUTPATIENT
Start: 2024-01-13

## 2024-01-13 RX ADMIN — OXYCODONE HYDROCHLORIDE AND ACETAMINOPHEN 1 TABLET: 5; 325 TABLET ORAL at 14:12

## 2024-01-13 NOTE — DISCHARGE INSTRUCTIONS
Rest, ice, compression, elevation.  Keep area immobilized until seen by orthopedics in follow up.  Use walker to help assist gait.  Continue OTC tylenol, ibuprofen for pain relief.  Caution sedation with pain reliever.  Follow up with orthopedics - please call to schedule appointment.  Return to ER as needed.

## 2024-01-13 NOTE — ED PROVIDER NOTES
History  Chief Complaint   Patient presents with    Ankle Injury     Patient reports slipping in the grass about an hour ago and injuring her L ankle. Denies LOC/head strike, does take ASA daily.      83 year old female with PMH HTN, HLD, h/o thyroid cancer presenting from home with family for evaluation of left ankle injury.  This occurred today a little over an hour ago.  Pt notes she was outside walking her dog when it pulled her and she slipped on the wet grass.  She reports she twisted the ankle when she came down and heard a crack.  She was unable to get up on her own.  She got help from her neighbor who applied ACE wrap.  She reports pain throughout the ankle and swelling.  Does not radiate.  Denies any prodromal symptoms prior to falling.  Denies hitting head.  No LOC, etc.  She is on baby aspirin daily.  Denies any other injuries.  Denies dizziness, lightheadedness, headache, neck or back pain.  Denies chest pain, SOB, N/V/D, abdominal pain.  Denies numbness, tingling.  Denies hip, knee or foot pain.  She notes she broke this ankle a few years ago.  She did take ibuprofen 600 mg for the pain around noon.      History provided by:  Patient and medical records   used: No    Ankle Injury  Location:  Left ankle  Chronicity:  New  Associated symptoms: no abdominal pain, no chest pain, no congestion, no cough, no diarrhea, no fatigue, no fever, no headaches, no nausea, no rash, no rhinorrhea, no shortness of breath, no sore throat, no vomiting and no wheezing        Prior to Admission Medications   Prescriptions Last Dose Informant Patient Reported? Taking?   ALPRAZolam (XANAX) 0.25 mg tablet   Yes No   Sig: Take 0.125-0.25 mg by mouth 2 (two) times a day as needed   acetaminophen (TYLENOL) 500 mg tablet   Yes No   Sig: Take 500 mg by mouth every 4 (four) hours as needed for mild pain, headaches or fever   aspirin (ECOTRIN LOW STRENGTH) 81 mg EC tablet   Yes No   Sig: Take 81 mg by mouth daily    bisacodyl (DULCOLAX) 5 mg EC tablet   No No   Sig: Take as directed by GI Office.   dicyclomine (BENTYL) 10 mg capsule   No No   Sig: Take 1 capsule (10 mg total) by mouth 3 (three) times a day as needed (for rectal pressure or abdominal cramping)   enalapril (VASOTEC) 10 mg tablet   Yes No   Sig: Take 10 mg by mouth daily   hydrochlorothiazide (HYDRODIURIL) 25 mg tablet  Self Yes No   Sig: Take 25 mg by mouth daily   levothyroxine 88 mcg tablet   Yes No   Sig: Take 88 mcg by mouth daily   mesalamine (ASACOL) 800 MG EC tablet   No No   Sig: Take 1 PO BID.   metoprolol succinate (TOPROL-XL) 25 mg 24 hr tablet   Yes No   Sig: Take 12.5 mg by mouth daily   ondansetron (ZOFRAN) 4 mg tablet   No No   Sig: Take 1 tablet (4 mg total) by mouth every 8 (eight) hours as needed for nausea or vomiting   polyethylene glycol (GOLYTELY) 4000 mL solution   No No   Sig: Take as directed by GI Office.   polyethylene glycol (Golytely) 4000 mL solution   No No   Sig: Take 4,000 mL by mouth once for 1 dose Take 4000 mL by mouth once for 1 dose. Use as directed   saccharomyces boulardii (FLORASTOR) 250 mg capsule   No No   Sig: Take 1 capsule (250 mg total) by mouth 2 (two) times a day   simvastatin (ZOCOR) 40 mg tablet   Yes No   Sig: Take 40 mg by mouth daily at bedtime      Facility-Administered Medications: None       Past Medical History:   Diagnosis Date    Diverticulitis     Hypertension     TIA (transient ischemic attack)        Past Surgical History:   Procedure Laterality Date    COLECTOMY         History reviewed. No pertinent family history.  I have reviewed and agree with the history as documented.    E-Cigarette/Vaping    E-Cigarette Use Never User      E-Cigarette/Vaping Substances     Social History     Tobacco Use    Smoking status: Every Day     Current packs/day: 0.25     Types: Cigarettes    Smokeless tobacco: Never   Vaping Use    Vaping status: Never Used   Substance Use Topics    Alcohol use: Never    Drug use:  Never       Review of Systems   Constitutional: Negative.  Negative for chills, fatigue and fever.   HENT: Negative.  Negative for congestion, rhinorrhea and sore throat.    Eyes: Negative.  Negative for visual disturbance.   Respiratory: Negative.  Negative for cough, shortness of breath and wheezing.    Cardiovascular: Negative.  Negative for chest pain.   Gastrointestinal: Negative.  Negative for abdominal pain, diarrhea, nausea and vomiting.   Genitourinary: Negative.  Negative for flank pain.   Musculoskeletal:  Positive for arthralgias, gait problem and joint swelling. Negative for back pain and neck pain.   Skin: Negative.  Negative for rash.   Neurological:  Negative for dizziness, light-headedness, numbness and headaches.   Psychiatric/Behavioral: Negative.  Negative for confusion.    All other systems reviewed and are negative.      Physical Exam  Physical Exam  Vitals and nursing note reviewed.   Constitutional:       General: She is awake. She is not in acute distress.     Appearance: She is well-developed. She is not toxic-appearing.   HENT:      Head: Normocephalic and atraumatic.      Right Ear: Hearing and external ear normal.      Left Ear: Hearing and external ear normal.      Nose: Nose normal.      Mouth/Throat:      Mouth: Mucous membranes are moist.      Pharynx: Oropharynx is clear.   Eyes:      General: Lids are normal. No scleral icterus.     Conjunctiva/sclera: Conjunctivae normal.      Pupils: Pupils are equal, round, and reactive to light.   Neck:      Trachea: Trachea normal. No tracheal deviation.   Cardiovascular:      Rate and Rhythm: Normal rate and regular rhythm.      Pulses: Normal pulses.           Dorsalis pedis pulses are 2+ on the right side and 2+ on the left side.        Posterior tibial pulses are 2+ on the right side and 2+ on the left side.      Heart sounds: Normal heart sounds, S1 normal and S2 normal. No murmur heard.  Pulmonary:      Effort: Pulmonary effort is  normal. No tachypnea or respiratory distress.      Breath sounds: Normal breath sounds. No wheezing, rhonchi or rales.   Abdominal:      General: Bowel sounds are normal. There is no distension.      Palpations: Abdomen is soft.      Tenderness: There is no abdominal tenderness. There is no guarding or rebound.   Musculoskeletal:      Cervical back: Normal range of motion and neck supple.      Left hip: Normal.      Left knee: No swelling or bony tenderness. Normal range of motion. Normal pulse.      Right lower leg: No edema.      Left lower leg: No edema.      Left ankle: Swelling present. Tenderness present over the lateral malleolus. No base of 5th metatarsal or proximal fibula tenderness. Decreased range of motion. Normal pulse.      Left foot: Normal capillary refill. No swelling or bony tenderness. Normal pulse.   Skin:     General: Skin is warm and dry.      Capillary Refill: Capillary refill takes less than 2 seconds.      Findings: No abrasion, bruising, laceration, rash or wound.   Neurological:      General: No focal deficit present.      Mental Status: She is alert and oriented to person, place, and time.      GCS: GCS eye subscore is 4. GCS verbal subscore is 5. GCS motor subscore is 6.      Cranial Nerves: No cranial nerve deficit.      Sensory: Sensation is intact. No sensory deficit.      Motor: Motor function is intact. No abnormal muscle tone.   Psychiatric:         Mood and Affect: Mood normal.         Speech: Speech normal.         Behavior: Behavior normal. Behavior is cooperative.         Vital Signs  ED Triage Vitals [01/13/24 1254]   Temperature Pulse Respirations Blood Pressure SpO2   98.7 °F (37.1 °C) 72 16 157/84 96 %      Temp Source Heart Rate Source Patient Position - Orthostatic VS BP Location FiO2 (%)   Temporal Monitor Sitting Left arm --      Pain Score       7           Vitals:    01/13/24 1254   BP: 157/84   Pulse: 72   Patient Position - Orthostatic VS: Sitting         Visual  Acuity      ED Medications  Medications   oxyCODONE-acetaminophen (PERCOCET) 5-325 mg per tablet 1 tablet (has no administration in time range)       Diagnostic Studies  Results Reviewed       None                   XR tibia fibula 2 views LEFT    (Results Pending)   XR ankle 3+ views LEFT    (Results Pending)   XR foot 3+ views LEFT    (Results Pending)              Procedures  Splint application    Date/Time: 1/13/2024 2:06 PM    Performed by: Gabriella Singer PA-C  Authorized by: Gabriella Singer PA-C  [place] Protocol:  Procedure performed by: (Hina MORALES)  Consent: Verbal consent obtained.  Risks and benefits: risks, benefits and alternatives were discussed  Patient understanding: patient states understanding of the procedure being performed  Site marked: the operative site was marked  Radiology Images displayed and confirmed. If images not available, report reviewed: imaging studies available  Patient identity confirmed: arm band and provided demographic data    Pre-procedure details:     Sensation:  Normal    Skin color:  Pink  Procedure details:     Laterality:  Left    Location:  Ankle    Ankle:  L ankle    Splint type:  Short leg    Supplies used: high cam walker.  Post-procedure details:     Pain:  Improved    Sensation:  Normal    Skin color:  Pink    Patient tolerance of procedure:  Tolerated well, no immediate complications           ED Course  ED Course as of 01/13/24 1412   Sat Jan 13, 2024   1257 Pt was offered and declined anything for pain.   1340 XR ankle 3+ views LEFT  Independently viewed and interpreted by me - obliquely oriented distal fibula fracture; pending official read.   1340 XR foot 3+ views LEFT  Independently viewed and interpreted by me - distal fibula fracture, otherwise no noted foot fractures; pending official read.   1340 XR tibia fibula 2 views LEFT  Independently viewed and interpreted by me - distal fibula fracture, otherwise no noted proximal fractures; pending official  read.   1341 Pt and family updated on results.  Reviewed xray images with them.  They indicate they will call orthopedics closer to them.  She lives on a one level floor at home.  She does have a walker as well as cane at home.  Will discharge with symptomatic management and outpatient orthopedics follow up.                               SBIRT 20yo+      Flowsheet Row Most Recent Value   Initial Alcohol Screen: US AUDIT-C     1. How often do you have a drink containing alcohol? 0 Filed at: 01/13/2024 1248   2. How many drinks containing alcohol do you have on a typical day you are drinking?  0 Filed at: 01/13/2024 1248   3a. Male UNDER 65: How often do you have five or more drinks on one occasion? 0 Filed at: 01/13/2024 1248   3b. FEMALE Any Age, or MALE 65+: How often do you have 4 or more drinks on one occassion? 0 Filed at: 01/13/2024 1248   Audit-C Score 0 Filed at: 01/13/2024 1248   MARY: How many times in the past year have you...    Used an illegal drug or used a prescription medication for non-medical reasons? Never Filed at: 01/13/2024 1248                      Medical Decision Making  84 yo female presenting for evaluation of left ankle injury after a mechanical slip and fall.  Isolated complaint of left ankle pain.  Will obtain xrays to further evaluate.  Clinically I do not suspect any associated intra-cranial, intra-thoracic or intra-abdominal traumatic findings.    Work up obtained as noted above.  Has noted distal fibular fracture.  No noted dislocation.  Ankle mortise appears intact.  No proximal fibula fracture, no noted foot fractures.  A cam walker was applied as this was preferred by family over an OCL splint.  Pt neurovascularly intact post application.  Pt does have walker and cane at home.  Does not feel she would do well with crutches.  Both pt and family both feel she will be able to get around at home and discharge felt appropriate at this time.    Reviewed RICE therapy.  Continue OTC  tylenol and ibuprofen as needed for pain relief.  PDMP was queried and no suspicious behaviors noted.  Will provide short course of opioid rx for severe pain.  Risks/benefits/side effects/alternatives discussed.  Orthopedics referral placed for outpatient follow up.  Strict return precautions outlined.  Advised outpatient follow up with orthopedics or return to ER for change in condition as outlined. Pt and family verbalized understanding and had no further questions.    Please refer to above ER course for further details/discussion.      Problems Addressed:  Closed left fibular fracture: acute illness or injury    Amount and/or Complexity of Data Reviewed  External Data Reviewed: labs, radiology and notes.  Radiology: ordered and independent interpretation performed. Decision-making details documented in ED Course.    Risk  OTC drugs.  Prescription drug management.             Disposition  Final diagnoses:   Closed left fibular fracture     Time reflects when diagnosis was documented in both MDM as applicable and the Disposition within this note       Time User Action Codes Description Comment    1/13/2024  1:49 PM Gabriella Singer Add [S82.402A] Closed left fibular fracture           ED Disposition       ED Disposition   Discharge    Condition   Stable    Date/Time   Sat Jan 13, 2024 1349    Comment   Jayda Tripp discharge to home/self care.                   Follow-up Information       Follow up With Specialties Details Why Contact Info Additional Information    Follow up with orthopedic specialist of your choice         [facility] [place] [facility]   [address] [place] [id number] [phone] [facility] [place], [address] [place] [address], [id number] [phone]    [facility] Emergency Medicine  As needed [address] [facility] [id number] [phone] [facility]  [facility] Emergency Department, [address], [place], [address]            Patient's Medications   Discharge Prescriptions    OXYCODONE-ACETAMINOPHEN (PERCOCET) 5-325 MG PER TABLET    Take 1 tablet by mouth every 6 (six) hours as needed for severe pain Initial Rx, dx: left ankle fracture Max Daily Amount: 4 tablets       Start Date: 1/13/2024 End Date: --       Order Dose: 1 tablet       Quantity: 10 tablet    Refills: 0           PDMP Review         Value Time User    PDMP Reviewed  Yes 1/13/2024  1:55 PM Gabriella Singer PA-C            ED Provider  Electronically Signed by             Gabriella Singer PA-C  01/13/24 7445